# Patient Record
Sex: MALE | Race: BLACK OR AFRICAN AMERICAN | NOT HISPANIC OR LATINO | Employment: UNEMPLOYED | URBAN - METROPOLITAN AREA
[De-identification: names, ages, dates, MRNs, and addresses within clinical notes are randomized per-mention and may not be internally consistent; named-entity substitution may affect disease eponyms.]

---

## 2018-01-15 ENCOUNTER — OFFICE VISIT (OUTPATIENT)
Dept: INTERNAL MEDICINE | Facility: CLINIC | Age: 63
End: 2018-01-15
Attending: FAMILY MEDICINE
Payer: MEDICAID

## 2018-01-15 ENCOUNTER — HOSPITAL ENCOUNTER (EMERGENCY)
Facility: OTHER | Age: 63
Discharge: HOME | End: 2018-01-15
Attending: EMERGENCY MEDICINE
Payer: MEDICAID

## 2018-01-15 VITALS
WEIGHT: 146.81 LBS | HEIGHT: 73 IN | BODY MASS INDEX: 19.46 KG/M2 | SYSTOLIC BLOOD PRESSURE: 134 MMHG | HEART RATE: 118 BPM | TEMPERATURE: 99 F | OXYGEN SATURATION: 93 % | DIASTOLIC BLOOD PRESSURE: 90 MMHG

## 2018-01-15 VITALS
SYSTOLIC BLOOD PRESSURE: 130 MMHG | WEIGHT: 148 LBS | HEIGHT: 73 IN | RESPIRATION RATE: 22 BRPM | TEMPERATURE: 99 F | BODY MASS INDEX: 19.61 KG/M2 | HEART RATE: 102 BPM | DIASTOLIC BLOOD PRESSURE: 76 MMHG | OXYGEN SATURATION: 93 %

## 2018-01-15 DIAGNOSIS — J44.9 CHRONIC OBSTRUCTIVE PULMONARY DISEASE, UNSPECIFIED COPD TYPE: Primary | ICD-10-CM

## 2018-01-15 DIAGNOSIS — J44.1 COPD WITH ACUTE EXACERBATION: Primary | ICD-10-CM

## 2018-01-15 DIAGNOSIS — I10 HYPERTENSION, UNSPECIFIED TYPE: ICD-10-CM

## 2018-01-15 DIAGNOSIS — F17.210 SMOKING GREATER THAN 40 PACK YEARS: ICD-10-CM

## 2018-01-15 DIAGNOSIS — R06.02 SHORTNESS OF BREATH: ICD-10-CM

## 2018-01-15 DIAGNOSIS — Z76.89 ENCOUNTER TO ESTABLISH CARE: ICD-10-CM

## 2018-01-15 DIAGNOSIS — J06.9 UPPER RESPIRATORY TRACT INFECTION, UNSPECIFIED TYPE: ICD-10-CM

## 2018-01-15 DIAGNOSIS — Z12.11 ENCOUNTER FOR SCREENING COLONOSCOPY: ICD-10-CM

## 2018-01-15 DIAGNOSIS — Z72.0 TOBACCO ABUSE: ICD-10-CM

## 2018-01-15 DIAGNOSIS — J44.1 COPD WITH ACUTE EXACERBATION: ICD-10-CM

## 2018-01-15 LAB
ALBUMIN SERPL BCP-MCNC: 3.7 G/DL
ALP SERPL-CCNC: 61 U/L
ALT SERPL W/O P-5'-P-CCNC: 11 U/L
ANION GAP SERPL CALC-SCNC: 10 MMOL/L
AST SERPL-CCNC: 24 U/L
BASOPHILS # BLD AUTO: 0.02 K/UL
BASOPHILS NFR BLD: 0.4 %
BILIRUB SERPL-MCNC: 0.6 MG/DL
BNP SERPL-MCNC: <10 PG/ML
BUN SERPL-MCNC: 13 MG/DL
CALCIUM SERPL-MCNC: 9.2 MG/DL
CHLORIDE SERPL-SCNC: 95 MMOL/L
CO2 SERPL-SCNC: 33 MMOL/L
CREAT SERPL-MCNC: 1.1 MG/DL
DIFFERENTIAL METHOD: ABNORMAL
EOSINOPHIL # BLD AUTO: 0 K/UL
EOSINOPHIL NFR BLD: 0 %
ERYTHROCYTE [DISTWIDTH] IN BLOOD BY AUTOMATED COUNT: 13.2 %
EST. GFR  (AFRICAN AMERICAN): >60 ML/MIN/1.73 M^2
EST. GFR  (NON AFRICAN AMERICAN): >60 ML/MIN/1.73 M^2
FLUAV AG SPEC QL IA: NEGATIVE
FLUBV AG SPEC QL IA: NEGATIVE
GLUCOSE SERPL-MCNC: 105 MG/DL
HCT VFR BLD AUTO: 48.7 %
HGB BLD-MCNC: 16.3 G/DL
LACTATE SERPL-SCNC: 1.6 MMOL/L
LYMPHOCYTES # BLD AUTO: 1.1 K/UL
LYMPHOCYTES NFR BLD: 24.6 %
MCH RBC QN AUTO: 33 PG
MCHC RBC AUTO-ENTMCNC: 33.5 G/DL
MCV RBC AUTO: 99 FL
MONOCYTES # BLD AUTO: 0.5 K/UL
MONOCYTES NFR BLD: 11.5 %
NEUTROPHILS # BLD AUTO: 2.9 K/UL
NEUTROPHILS NFR BLD: 63.3 %
PLATELET # BLD AUTO: 161 K/UL
PMV BLD AUTO: 11.2 FL
POTASSIUM SERPL-SCNC: 3.6 MMOL/L
PROT SERPL-MCNC: 7.7 G/DL
RBC # BLD AUTO: 4.94 M/UL
SODIUM SERPL-SCNC: 138 MMOL/L
SPECIMEN SOURCE: NORMAL
TROPONIN I SERPL DL<=0.01 NG/ML-MCNC: <0.006 NG/ML
WBC # BLD AUTO: 4.6 K/UL

## 2018-01-15 PROCEDURE — 96374 THER/PROPH/DIAG INJ IV PUSH: CPT | Mod: 59

## 2018-01-15 PROCEDURE — 83605 ASSAY OF LACTIC ACID: CPT

## 2018-01-15 PROCEDURE — 99284 EMERGENCY DEPT VISIT MOD MDM: CPT | Mod: 25,27

## 2018-01-15 PROCEDURE — 99214 OFFICE O/P EST MOD 30 MIN: CPT | Mod: PBBFAC,PO,25 | Performed by: INTERNAL MEDICINE

## 2018-01-15 PROCEDURE — 63600175 PHARM REV CODE 636 W HCPCS: Performed by: EMERGENCY MEDICINE

## 2018-01-15 PROCEDURE — 87400 INFLUENZA A/B EACH AG IA: CPT | Mod: 59

## 2018-01-15 PROCEDURE — 99204 OFFICE O/P NEW MOD 45 MIN: CPT | Mod: S$PBB,,, | Performed by: STUDENT IN AN ORGANIZED HEALTH CARE EDUCATION/TRAINING PROGRAM

## 2018-01-15 PROCEDURE — 96361 HYDRATE IV INFUSION ADD-ON: CPT

## 2018-01-15 PROCEDURE — 93005 ELECTROCARDIOGRAM TRACING: CPT

## 2018-01-15 PROCEDURE — 93010 ELECTROCARDIOGRAM REPORT: CPT | Mod: ,,, | Performed by: INTERNAL MEDICINE

## 2018-01-15 PROCEDURE — 25000003 PHARM REV CODE 250: Performed by: EMERGENCY MEDICINE

## 2018-01-15 PROCEDURE — 25500020 PHARM REV CODE 255: Performed by: EMERGENCY MEDICINE

## 2018-01-15 PROCEDURE — 99999 PR PBB SHADOW E&M-EST. PATIENT-LVL IV: CPT | Mod: PBBFAC,,, | Performed by: INTERNAL MEDICINE

## 2018-01-15 PROCEDURE — 84484 ASSAY OF TROPONIN QUANT: CPT

## 2018-01-15 PROCEDURE — 83880 ASSAY OF NATRIURETIC PEPTIDE: CPT

## 2018-01-15 PROCEDURE — 85025 COMPLETE CBC W/AUTO DIFF WBC: CPT

## 2018-01-15 PROCEDURE — 80053 COMPREHEN METABOLIC PANEL: CPT

## 2018-01-15 RX ORDER — IPRATROPIUM BROMIDE AND ALBUTEROL SULFATE 2.5; .5 MG/3ML; MG/3ML
3 SOLUTION RESPIRATORY (INHALATION)
Status: DISCONTINUED | OUTPATIENT
Start: 2018-01-15 | End: 2018-01-30

## 2018-01-15 RX ORDER — FLUTICASONE PROPIONATE AND SALMETEROL XINAFOATE 45; 21 UG/1; UG/1
1 AEROSOL, METERED RESPIRATORY (INHALATION) 2 TIMES DAILY
Qty: 3 INHALER | Refills: 4 | Status: SHIPPED | OUTPATIENT
Start: 2018-01-15 | End: 2018-09-12

## 2018-01-15 RX ORDER — DOXYCYCLINE 100 MG/1
100 CAPSULE ORAL DAILY
Qty: 10 CAPSULE | Refills: 0 | Status: SHIPPED | OUTPATIENT
Start: 2018-01-15 | End: 2018-01-30

## 2018-01-15 RX ORDER — PREDNISONE 10 MG/1
50 TABLET ORAL DAILY
Qty: 25 TABLET | Refills: 0 | Status: SHIPPED | OUTPATIENT
Start: 2018-01-15 | End: 2018-01-20

## 2018-01-15 RX ORDER — METHYLPREDNISOLONE SOD SUCC 125 MG
125 VIAL (EA) INJECTION
Status: COMPLETED | OUTPATIENT
Start: 2018-01-15 | End: 2018-01-15

## 2018-01-15 RX ADMIN — SODIUM CHLORIDE 1000 ML: 0.9 INJECTION, SOLUTION INTRAVENOUS at 05:01

## 2018-01-15 RX ADMIN — SODIUM CHLORIDE 500 ML: 900 INJECTION, SOLUTION INTRAVENOUS at 04:01

## 2018-01-15 RX ADMIN — METHYLPREDNISOLONE SODIUM SUCCINATE 125 MG: 125 INJECTION, POWDER, FOR SOLUTION INTRAMUSCULAR; INTRAVENOUS at 04:01

## 2018-01-15 RX ADMIN — IOHEXOL 75 ML: 350 INJECTION, SOLUTION INTRAVENOUS at 05:01

## 2018-01-15 NOTE — PROGRESS NOTES
"Subjective:       Patient ID: Avni Reyes is a 62 y.o. male.    Chief Complaint: Headache; Dizziness; Establish Care; Cough; Shortness of Breath; and Shoulder Pain    HPI   Avni Reyes is a 62 y.o. male with PMHx tobacco abuse (>40 pack years) who presents with C/C of establish care. He is also complaining of 2 week history of URTI worsening shortness of breath nonproductive cough. He is accompanied by his partner who provided additional history. He only takes albuterol prn given to him at at prior ER visit and has not been on any regular medications.    He has been short of breath for years on exertion. This has worsened over the past two weeks, such as "turning a screwdriver". He has had an increased dry cough. Of note he was seen in ED in 2016 with provisional dx of COPD exacerbation. He ahs not had PFTs but may have had them done at [place] years ago and was told he may have COPD.     He is also complaining of shoulder pain particularly on elevation. This is chronic for years.     He has a surgical history of colonoscopy while incarcerated around 2009. He does not know the details of the results but he knows that they "took a sample".    Past Surgical History:   Procedure Laterality Date    COLONOSCOPY      2009. Unknown results but pathology samples taken per patient.     LEG SURGERY      seconndary to [facility]- Right leg     Family History   Problem Relation Age of Onset    Hyperlipidemia Mother     Heart disease Mother     Heart disease Father     Hyperlipidemia Father     Diabetes Father      Social History     Social History    Marital status: Single     Spouse name: N/A    Number of children: N/A    Years of education: N/A     Social History Main Topics    Smoking status: Current Every Day Smoker     Packs/day: 1.50     Types: Cigarettes     Start date: 12/16/1967    Smokeless tobacco: Never Used    Alcohol use Yes      Comment: social    Drug use: No    Sexual activity: Yes     Partners: Female " "    Other Topics Concern    None     Social History Narrative    None     Past Medical History:   Diagnosis Date    Hyperlipidemia     Hypertension          Review of Systems   Constitutional: Positive for appetite change (decreased) and fatigue. Negative for chills, fever and unexpected weight change (but unable to gain weight).   HENT: Negative for congestion, rhinorrhea and sore throat.    Eyes: Negative for redness and itching.   Respiratory: Positive for cough (nonproductive), shortness of breath and wheezing.    Cardiovascular: Negative for chest pain and palpitations.   Gastrointestinal: Negative for abdominal pain, constipation, diarrhea, nausea and vomiting.   Genitourinary: Negative for dysuria, frequency, hematuria and urgency.   Musculoskeletal: Negative for arthralgias and myalgias.   Skin: Negative for rash and wound.   Neurological: Negative for dizziness, light-headedness and headaches.       Objective:       Blood pressure (!) 134/90, pulse (!) 118, temperature 99 °F (37.2 °C), height 6' 1" (1.854 m), weight 66.6 kg (146 lb 13.2 oz), SpO2 (!) 93 %.      Physical Exam   Constitutional: He appears well-developed. He appears cachectic.   HENT:   Head: Normocephalic and atraumatic.   Eyes: Conjunctivae and EOM are normal. Pupils are equal, round, and reactive to light.   Neck: Normal range of motion. Neck supple. No JVD present.   Cardiovascular: S1 normal and S2 normal.  Tachycardia present.  Exam reveals no gallop and no friction rub.    No murmur heard.  Pulmonary/Chest: Accessory muscle usage present. Tachypnea noted. He has no wheezes. He has rhonchi in the left lower field.   Abdominal breathing noted on effort.    Abdominal: Soft. He exhibits no distension. There is no tenderness.   Musculoskeletal: He exhibits no edema.        Right shoulder: He exhibits decreased range of motion, pain and spasm.        Left shoulder: Normal.   Neurological: He is alert.   Skin: Nails show clubbing. "   Nursing note and vitals reviewed.      Assessment:       1. COPD with acute exacerbation    2. Wheezing    3. Smoking greater than 40 pack years    4. Encounter to establish care    5. Encounter for screening colonoscopy        Plan:       Avni was seen today for headache, dizziness, establish care, cough, shortness of breath and shoulder pain.    Diagnoses and all orders for this visit:    COPD with acute exacerbation  · Patient is tachychardic with very low O2 sats,d esatting to 80s on ambulation with HR in 140s. Resting HR was never less than 120 on pulse ox. O2 sats never above 94.  · Provided 1x duoneb treatment with no effect except worsening tachycardia.  · Discussed with patient that he should be evaluated in ED for COPD exacerbation and may require admission.  · Offered transportation by ambulance but patient and partner declined stating that they will proceed directly to [facility] on their own. Map to [facility] ER was provided.  · Will provide referral to [facility] pulmonary, doxycycline combivent and advair for exacerbation  · Will need formal PFTs and CXR at a minimum.   · Some concern for malignancy given B symptoms of decreased appetite and inability to gain weight.  · Encourage smoking cessation.    Upper respiratory tract infection, unspecified type    Smoking greater than 40 pack years  · Encourage smoking cessation and refer to cessation program.     · Encounter to establish care  · Needs A1C, CBC CMP HCV (never tested)  · Will arrange for repeat C-scope given concerning history.  · Will arrange for pneumococcal vaccinations at next visit      F/u in 2 weeks with first available  Case d/w Dr. Valeria Gibson II, MD  Internal Medicine PGY3  [phone]  d

## 2018-01-15 NOTE — ED PROVIDER NOTES
Encounter Date: 1/15/2018    SCRIBE #1 NOTE: I, Demetrius Tyler, am scribing for, and in the presence of, Dr. Ford.       History     Chief Complaint   Patient presents with    Shortness of Breath     He was seen at clinic and was sent over here for shortness of breath.  He states that he has been short of breath for a while but it has gotten to where he feels he can't catch his breath.       3:43 PM    Patient is a 62 y.o. male with a history of COPD who presents to the ED with complaint of shortness of breath. He was seen by his PCP earlier today and was referred to the ED for further evaluation. Shortness of breath is worse with exertion. He reports improvement after breathing treatment administered here. He reports recent sickness with symptoms including fatigue and productive cough with green sputum. He denies fever or chills. He reports positive sick contact with his wife who has flu-like symptoms. He states he did not get a flu shot this year. He denies taking any OTC medications for current symptoms or any daily prescription medications. He reports that he had not been to see a doctor for two years prior to today. He denies past hospitalizations for shortness of breath. He admits to use of tobacco. He has no additional complaints.      The history is provided by the patient and the spouse.     Review of patient's allergies indicates:  No Known Allergies  Past Medical History:   Diagnosis Date    COPD (chronic obstructive pulmonary disease)     Hyperlipidemia     Hypertension      Past Surgical History:   Procedure Laterality Date    COLONOSCOPY      2009. Unknown results but pathology samples taken per patient.     LEG SURGERY      [facility]conndary to [facility] [place]- Right leg     Family History   Problem Relation Age of Onset    Hyperlipidemia Mother     Heart disease Mother     Heart disease Father     Hyperlipidemia Father     Diabetes Father      Social History   Substance Use Topics    Smoking status:  Current Every Day Smoker     Packs/day: 1.50     Types: Cigarettes     Start date: 12/16/1967    Smokeless tobacco: Never Used    Alcohol use Yes      Comment: social     Review of Systems   Constitutional: Positive for fatigue. Negative for fever.   HENT: Negative for sore throat.    Respiratory: Positive for shortness of breath.    Cardiovascular: Negative for chest pain.   Gastrointestinal: Negative for nausea.   Genitourinary: Negative for dysuria.   Musculoskeletal: Negative for back pain.   Skin: Negative for rash.   Neurological: Negative for weakness.   Hematological: Does not bruise/bleed easily.   Psychiatric/Behavioral: Negative for confusion.       Physical Exam     Initial Vitals [01/15/18 1519]   BP Pulse Resp Temp SpO2   133/83 (!) 127 (!) 22 99 °F (37.2 °C) (!) 94 %      MAP       99.67         Physical Exam    Nursing note and vitals reviewed.  Constitutional: He appears well-developed and well-nourished. He is not diaphoretic.  Non-toxic appearance. No distress.   HENT:   Head: Normocephalic and atraumatic.   Eyes: Conjunctivae and EOM are normal. Pupils are equal, round, and reactive to light.   No conjunctival pallor.   Neck: Normal range of motion. Neck supple. No JVD present.   Cardiovascular: Regular rhythm and normal heart sounds. Tachycardia present.    Pulmonary/Chest: No respiratory distress.   Diminished breath sounds bilaterally with prolonged end expiratory wheezing.   Musculoskeletal: Normal range of motion. He exhibits no edema (no lower extremity edema).   Neurological: He is alert and oriented to person, place, and time.   Skin: Skin is warm and dry.   Psychiatric: He has a normal mood and affect. His behavior is normal. Judgment and thought content normal.         ED Course   Procedures  Labs Reviewed   CBC W/ AUTO DIFFERENTIAL - Abnormal; Notable for the following:        Result Value    MCV 99 (*)     MCH 33.0 (*)     All other components within normal limits   COMPREHENSIVE  METABOLIC PANEL - Abnormal; Notable for the following:     CO2 33 (*)     All other components within normal limits   LACTIC ACID, PLASMA   TROPONIN I   INFLUENZA A AND B ANTIGEN   B-TYPE NATRIURETIC PEPTIDE     Imaging Results          CTA Chest Non-Coronary - PE Study (Final result)  Result time 01/15/18 17:36:05    Final result by Dany Mulligan MD (01/15/18 17:36:05)                 Impression:       1.  No evidence of central pulmonary embolism.  Distal and subsegmental pulmonary emboli are not excluded.  Suggest correlation with D-dimer and DVT study.    2.  Diffuse centrilobular emphysema with enlargement of the pulmonary tree.  The findings may be seen with pulmonary artery hypertension.    3.  Nonspecific right hilar lymphadenopathy.          Electronically signed by: DANY MULLIGAN MD  Date:     01/15/18  Time:    17:36              Narrative:    Exam: [id number]  01/15/18  17:04:16 [id number] ([facility]) : CTA CHEST NON CORONARY    Technique:     CT images of the chest obtained during pulmonary arterial phase after injection of 75 cc Omnipaque IV contrast. Coronal and Sagittal Reformats were obtained.       Comparison:    Chest x-ray dated 01/15/2018    Findings:      CT pulmonary embolism examination:  No filling defects are identified within the central pulmonary tree.  The distal and subsegmental pulmonary tree are poorly visualized secondary to motion.  There is enlargement of the pulmonary trunk and main pulmonary arteries.    CT chest examination:   The thyroid gland is poorly visualized.  The base of the neck is grossly unremarkable.    The trachea and the central airways are within normal limits.  No definitive endobronchial lesion is identified.  There is no evidence of bronchiectasis.    The heart is unremarkable.  No pericardial effusions are present.  Coronary artery calcifications are present.  There is no evidence of intracardiac thrombus.    The ascending thoracic aorta measures 3.4 x 3.2 cm.  The  descending aorta measures 2.7 x 3.0 cm.  No intimal flap is present to suggest a dissection.  The great vessels arising from aortic arch are within normal limits.    There is a 2.2 x 1.6 cm right hilar lymph node.  No additional large lymph nodes are present.  The axillary regions are within normal limits.    There are no pleural effusions.  There is no evidence of a pneumothorax.  There is no evidence of a pneumomediastinum.  No airspace opacities are present.  There is diffuse centrilobular emphysema.    The esophagus is unremarkable.  The visualized upper abdominal structures are within normal limits.  There is no evidence of free air in the upper abdomen.    There is diffuse cachexia.  The chest wall is otherwise unremarkable.  There are degenerative changes in the osseous structures.                             X-Ray Chest 1 View (Final result)  Result time 01/15/18 16:31:52    Final result by Ceasar Brooks MD (01/15/18 16:31:52)                 Impression:      Hyperinflated clear lungs.      Electronically signed by: CEASAR BROOKS  Date:     01/15/18  Time:    16:31              Narrative:    CLINICAL HISTORY:  Shortness of breath    TECHNIQUE: Single view portable frontal radiograph of the chest    COMPARISON: Chest radiograph 11/21/16.      FINDINGS:  Support devices: None    Chest: Cardiac and mediastinal silhouettes are normal.  Lungs are hyperinflated and clear.  No pleural effusion or pneumothorax.    Upper Abdomen: Normal.    Other: N/A.                              EKG Readings: (Independently Interpreted)   Sinus tachycardia at a rate of 120 bpm. Normal axis. Normal intervals. No STEMI.          Medical Decision Making:   Initial Assessment:   Urgent evaluation of 62-year-old gentleman with chronic tobacco abuse, sent from initial primary care evaluation with concern for respiratory distress.  Patient endorses chronic shortness of breath, with worsened dyspnea on exertion.  Patient was sent from clinic  given exertional tachycardia and hypoxia, status post receiving nebulizer treatment.  On exam patient denies weight loss, no hemoptysis, endorses green sputum, no fevers, weight with similar URI symptoms.  On exam patient is well appearing despite vital signs notable for tachycardia, mild hypoxia.  No conversational dyspnea noted.  Suspect COPD, with acute exacerbation, influenza, pneumonia, lower suspicion for PE at this time.  Patient given additional steroids, IV fluids, with plan to reassess.  Independently Interpreted Test(s):   I have ordered and independently interpreted EKG Reading(s) - see prior notes  Clinical Tests:   Lab Tests: Ordered and Reviewed  Radiological Study: Reviewed and Ordered  Medical Tests: Ordered and Reviewed  ED Management:  Labs without evidence of CHF, x-ray with hyperinflation, without infiltrate, CTA negative for large central PE.  Patient now more comfortably ambulating, without tachycardia nor hypoxia.  Suspect symptoms from underlying long-standing COPD versus pulmonary hypertension.  Patient already to be discharged home with doxycycline, nebs, follow up [facility] pulmonology.            Scribe Attestation:   Scribe #1: I performed the above scribed service and the documentation accurately describes the services I performed. I attest to the accuracy of the note.    Attending Attestation:           Physician Attestation for Scribe:  Physician Attestation Statement for Scribe #1: I, Dr. Ford, reviewed documentation, as scribed by Demetrius Tyler in my presence, and it is both accurate and complete.                 ED Course      Clinical Impression:     1. Chronic obstructive pulmonary disease, unspecified COPD type    2. Shortness of breath    3. Tobacco abuse          Disposition:   Disposition: Discharged  Condition: Stable                        Nabila Ford MD  01/15/18 [id number]

## 2018-01-15 NOTE — PROGRESS NOTES
I have reviewed and concur with the resident's history, physical, assessment, and plan.  I have personally interviewed and examined the patient  Avni Reyes at bedside. See below addendum for my evaluation and additional findings.    61 y/o man with previous ER visit in 11/2016 for COPD here with acute on chronic dyspnea on exertion, +persistent cough. Generally nonproductive cough but worse in past 2-3 weeks after getting URI/flu-like symptoms. No fevers. No congestion currently.   On evaluation, has had normal SaO2 in the past but this is low today - 90-94% on RA at rest, down to 88% with walking a short distance. HR in 90-110s at rest, but increases to 130-140s after walking short distance.  Did have coarse breath sounds / crackles at bases (LLL > RLL) that improved with neb treatment, and overall air movement improved, but SaO2 did not improve significantly with nebs. No wheezing on exam.   He feels overall that his dyspnea on exertion has been gradually progressive over the past 6+ months, with a more acute worsening in the past ~2 weeks.     Given likely COPD with acute exacerbation with hypoxia and tachycardia, sending to ER from clinic today. Discussed sending in EMS; patient declines, and given that clinically he appears stable enough to transport to ER with his partner and has a strong preference for this over EMS, will have him go directly to ER. Report called to ER by Dr Gibson.    Plan for close ER/hospital follow up with in 2 weeks.    Ponce Shaw MD

## 2018-01-16 NOTE — ED NOTES
Ambulated pt throughout hallway. Pt with oxygen sats at 91%. Denies SOB with ambulation. MD notified.

## 2018-01-16 NOTE — ED NOTES
Pt presents to ED with c/o SOB with cough x couple weeks. Pt reports recent exposure to flu illness by significant other. Pt oxygen saturations at 94% on room air. Skin and mucous membranes very dry. Pt AAOx4 and appropriate at this time. Respirations mildly increased, no use of accessory muscles noted. No acute distress noted. Pt placed on cardiac monitoring, BP, and pulse ox monitoring. Sinus tachycardia noted on monitor at 120 bpm, MD aware.

## 2018-01-16 NOTE — ED NOTES
"Appearance: Pt awake, alert & oriented to person, place & time. Pt in no acute distress at present time. Pt is clean and well groomed with clothes appropriately fastened.   Skin: Skin warm, dry & intact. Color consistent with ethnicity. Mucous membranes dry. No breakdown or brusing noted.   Musculoskeletal: Patient moving all extremities well, no obvious swelling or deformities noted. Pt ambulatory with steady gait.    Respiratory: Respirations even, mild increase in RR. Visible chest rise noted. Airway is open and patent. No accessory muscle use noted. Pt also reporting productive cough x "couple weeks." Lung sounds are clear upon auscultation.   Neurologic: Sensation is intact. Speech is clear and appropriate. Eyes open spontaneously, behavior appropriate to situation, follows commands, facial expression symmetrical, bilateral hand grasp equal and even, purposeful motor response noted.  Cardiac: No Bilateral lower extremity edema. Cap refill is <3 seconds. Pt denies active chest pains, SOB, dizziness, blurred vision, weakness or fatigue at this time.   Abdomen: Abdomen soft, non-tender to palpation. Pt denies active abd pains, cramping or discomfort, No N/V/D at this time.   : Pt reports no dysuria or hematuria.        "

## 2018-01-30 ENCOUNTER — OFFICE VISIT (OUTPATIENT)
Dept: INTERNAL MEDICINE | Facility: CLINIC | Age: 63
End: 2018-01-30
Attending: FAMILY MEDICINE
Payer: MEDICAID

## 2018-01-30 VITALS
BODY MASS INDEX: 20.79 KG/M2 | HEIGHT: 73 IN | WEIGHT: 156.88 LBS | HEART RATE: 108 BPM | OXYGEN SATURATION: 96 % | DIASTOLIC BLOOD PRESSURE: 86 MMHG | SYSTOLIC BLOOD PRESSURE: 124 MMHG | TEMPERATURE: 98 F

## 2018-01-30 DIAGNOSIS — J44.1 COPD WITH ACUTE EXACERBATION: Primary | ICD-10-CM

## 2018-01-30 DIAGNOSIS — F17.210 SMOKING GREATER THAN 40 PACK YEARS: ICD-10-CM

## 2018-01-30 PROCEDURE — 3008F BODY MASS INDEX DOCD: CPT | Mod: ,,, | Performed by: STUDENT IN AN ORGANIZED HEALTH CARE EDUCATION/TRAINING PROGRAM

## 2018-01-30 PROCEDURE — 99214 OFFICE O/P EST MOD 30 MIN: CPT | Mod: PBBFAC,PO | Performed by: STUDENT IN AN ORGANIZED HEALTH CARE EDUCATION/TRAINING PROGRAM

## 2018-01-30 PROCEDURE — 90471 IMMUNIZATION ADMIN: CPT | Mod: PBBFAC,PO

## 2018-01-30 PROCEDURE — 99999 PR PBB SHADOW E&M-EST. PATIENT-LVL IV: CPT | Mod: PBBFAC,,, | Performed by: STUDENT IN AN ORGANIZED HEALTH CARE EDUCATION/TRAINING PROGRAM

## 2018-01-30 PROCEDURE — 99213 OFFICE O/P EST LOW 20 MIN: CPT | Mod: S$PBB,,, | Performed by: STUDENT IN AN ORGANIZED HEALTH CARE EDUCATION/TRAINING PROGRAM

## 2018-01-30 NOTE — PATIENT INSTRUCTIONS
Call [facility] pulm: [phone] or [phone] for an appointment   Call [facility] pulmonology: [phone]    Kicking the Smoking Habit  If you smoke, quitting is one of the best changes you can make for your heart and your overall health. Your risk of heart attack goes down within one day of putting out that last cigarette. As you go longer without smoking, your risk goes down even more. Quitting isnt easy, but millions of people have done it. You can, too. Its never too late to quit.  Getting started  Boost your chances of success by deciding on your quit plan. Your health care provider and cardiac rehab team can help you develop this plan. Even if youve already quit, its easy to slip back into smoking.  Your plan can help you avoid and recover from relapse.  In any case, start by setting a date to quit within a month, and do it.    Keys to your quit plan  · Talk to your healthcare provider about prescription medicines and nicotine replacement products that help stop the urge to smoke.   · Join a support group or quit smoking program. Talking with others about the challenges of quitting can help you get through them.  · Ask other smokers in your household to quit with you.  · Look for the cues in your life that you associate with smoking and avoid them.  Track your triggers  What gives you that N-zlub-w-cigarette feeling? List all the situations that make you want a cigarette. Then think of other ways to deal with these situations. Here are some examples:  Situation How I'll handle it   Finishing a meal Get up from the table and take a walk   Having an argument Find a quiet place and breathe deeply   Feeling lonely or bored Call a friend to talk         Tips for quitting successfully  · List the benefits of quitting such as reducing heart risks and saving money. Keep this list and review it whenever you feel like smoking.  · Get support. Let your friends know you may call them to chat when you have an urge to  smoke.  · If youve tried to quit before without success, this time avoid the triggers that may cause the relapse.  · Make the most of slip-ups. Try to learn from them, and then get back on track.  · Be accountable to your friends and your calendar so that you stay on track.  For family and friends  · Be supportive and patient. Quitting smoking can be difficult and stressful.  · If you smoke, nows a great time to quit. Even if you dont quit, never smoke around your loved one. Secondhand smoke is dangerous to his or her heart.  · The best goals are accomplished in teams. Remember that when your loved one states he or she wants to stop smoking.  Date Last Reviewed: 7/1/2016  © 2428-7023 [employer]. [address] [place] [address], [place] [address]. All rights reserved. This information is not intended as a substitute for professional medical care. Always follow your healthcare professional's instructions.

## 2018-01-30 NOTE — PROGRESS NOTES
I have been physically present during the critical or key portions of the service furnished by the resident and I have participated in the management of the patient.  Dr. Orellana

## 2018-01-30 NOTE — PROGRESS NOTES
Subjective:       Patient ID: Avni Reyes is a 62 y.o. male.    Chief Complaint: Follow-up (2 weeks)    HPI      62 y.o. male with PMHx tobacco abuse (>40 pack years) who was seen in [facility] for COPD exacerbation/establish care 2 weeks ago and was sent to ED for SOB/COPD exacerbation. At the ED visit, patient had a CTA that did not reveal PE, but did reveal emphysematous changes. Pt was given steroids, doxycycline, and nebs and was discharged home and instructed to follow up with [facility] pulm. Reported he stopped smoking a pack of cigs/day and is smoking 2 cigars a week now. States that he is using is combivent about twice a day instead of 3-4x a day when he had his COPD exacerbation. States he feels better after his course of steroids and abx.      Review of Systems   Constitutional: Negative for chills and unexpected weight change.   HENT: Negative for sore throat.    Respiratory: Positive for cough (stable). Negative for shortness of breath and wheezing.    Cardiovascular: Negative for chest pain and palpitations.   Gastrointestinal: Negative for abdominal pain, diarrhea, nausea and vomiting.   Neurological: Negative for weakness.       Objective:       Vitals:    01/30/18 1404   BP: 124/86   Pulse: 108   Temp: 98.3 °F (36.8 °C)   SaO2 96%    Physical Exam   Constitutional: He is oriented to person, place, and time. No distress.   HENT:   Mouth/Throat: No oropharyngeal exudate.   Cardiovascular: Normal rate, regular rhythm and normal heart sounds.    Pulmonary/Chest: Effort normal and breath sounds normal. He has no wheezes.   Abdominal: Soft. Bowel sounds are normal. There is no tenderness.   Lymphadenopathy:     He has no cervical adenopathy.   Neurological: He is alert and oriented to person, place, and time.   Skin: Skin is warm and dry.   Psychiatric: He has a normal mood and affect.       Assessment:       1. COPD with acute exacerbation    2. Smoking greater than 40 pack years        Plan:       63 yo male with  COPD here for follow up after COPD exacerbation about 2 weeks ago. States he is doing well after his course of treatment with doxy and prednisone. Uses his rescue inhaler about twice a day. Still smoking but stated he is interested in cessation.     COPD with acute exacerbation  -     (In Office Administered) Pneumococcal Polysaccharide Vaccine (23 Valent) (SQ/IM)  - Will give flu shot as well today  - Discussed with patient to continue with inhalers, stated he still has not received his Advair and will get in touch with Dr. Gibson for PA.       Smoking greater than 40 pack years         -  Smoking cessation clinic and [facility] pul[facility] referral placed at last office visit   -> pt interested and stated he will call [facility] and [facility] pul[facility] for appts    D/w Dr. Orellana    RTC PRN, CC Dr. Arthur Mcdermott PGY3

## 2018-02-12 ENCOUNTER — TELEPHONE (OUTPATIENT)
Dept: SMOKING CESSATION | Facility: CLINIC | Age: 63
End: 2018-02-12

## 2018-02-14 ENCOUNTER — TELEPHONE (OUTPATIENT)
Dept: ENDOSCOPY | Facility: HOSPITAL | Age: 63
End: 2018-02-14

## 2018-02-14 DIAGNOSIS — Z12.11 SPECIAL SCREENING FOR MALIGNANT NEOPLASMS, COLON: Primary | ICD-10-CM

## 2018-02-14 RX ORDER — POLYETHYLENE GLYCOL 3350, SODIUM SULFATE ANHYDROUS, SODIUM BICARBONATE, SODIUM CHLORIDE, POTASSIUM CHLORIDE 236; 22.74; 6.74; 5.86; 2.97 G/4L; G/4L; G/4L; G/4L; G/4L
4 POWDER, FOR SOLUTION ORAL ONCE
Qty: 4000 ML | Refills: 0 | Status: SHIPPED | OUTPATIENT
Start: 2018-02-14 | End: 2018-02-14

## 2018-02-22 ENCOUNTER — TELEPHONE (OUTPATIENT)
Dept: SMOKING CESSATION | Facility: CLINIC | Age: 63
End: 2018-02-22

## 2018-02-22 NOTE — TELEPHONE ENCOUNTER
Cell number does not have voice mail set up. Home phone no answer. Calling about missed appointment for tobacco cessation program.

## 2018-03-13 ENCOUNTER — TELEPHONE (OUTPATIENT)
Dept: SMOKING CESSATION | Facility: CLINIC | Age: 63
End: 2018-03-13

## 2018-03-15 ENCOUNTER — TELEPHONE (OUTPATIENT)
Dept: SMOKING CESSATION | Facility: CLINIC | Age: 63
End: 2018-03-15

## 2018-03-15 NOTE — TELEPHONE ENCOUNTER
Attempted to contact patient about missed appointment for tobacco cessation program. Mobile number does not have voice mail set up and Home number is disconnected at this time.

## 2018-04-03 ENCOUNTER — TELEPHONE (OUTPATIENT)
Dept: SMOKING CESSATION | Facility: CLINIC | Age: 63
End: 2018-04-03

## 2018-04-03 NOTE — TELEPHONE ENCOUNTER
Attempted to reach patient to reschedule appointment for the free tobacco cessation program. Cell number no voice mail set up and home number is busy.

## 2018-04-12 ENCOUNTER — TELEPHONE (OUTPATIENT)
Dept: SMOKING CESSATION | Facility: CLINIC | Age: 63
End: 2018-04-12

## 2018-04-12 NOTE — TELEPHONE ENCOUNTER
Attempted to contact patient about missed intake appointment for tobacco cessation program. Cell phone does not have a voice mail box and home number is a fast busy.

## 2018-05-07 ENCOUNTER — TELEPHONE (OUTPATIENT)
Dept: SMOKING CESSATION | Facility: CLINIC | Age: 63
End: 2018-05-07

## 2018-05-14 ENCOUNTER — TELEPHONE (OUTPATIENT)
Dept: SMOKING CESSATION | Facility: CLINIC | Age: 63
End: 2018-05-14

## 2018-05-14 NOTE — TELEPHONE ENCOUNTER
Attempted to contact patient about missed intake appointment for the free tobacco cessation program. Patient cell phone does not have voice mail set up and the home number is no longer a working number.

## 2018-08-11 ENCOUNTER — TELEPHONE (OUTPATIENT)
Dept: ENDOSCOPY | Facility: HOSPITAL | Age: 63
End: 2018-08-11

## 2018-09-11 DIAGNOSIS — J44.1 COPD WITH ACUTE EXACERBATION: ICD-10-CM

## 2018-09-11 NOTE — TELEPHONE ENCOUNTER
----- Message from Dayana Najera sent at 9/11/2018 10:29 AM CDT -----  Contact: self/[phone]  Type: Rx    Name of medication(s): ipratropium-albuterol (COMBIVENT)  mcg/actuation inhaler    Is this a refill? New rx? Refill     Who prescribed medication?      Pharmacy Name, Phone, & Location:[facility] [address] [place] [address] [facility]    Comments: Please advise.      Thanks

## 2018-09-12 ENCOUNTER — TELEPHONE (OUTPATIENT)
Dept: INTERNAL MEDICINE | Facility: CLINIC | Age: 63
End: 2018-09-12

## 2018-09-12 DIAGNOSIS — J44.1 COPD WITH ACUTE EXACERBATION: Primary | ICD-10-CM

## 2018-09-12 RX ORDER — FLUTICASONE PROPIONATE 110 UG/1
1 AEROSOL, METERED RESPIRATORY (INHALATION) 2 TIMES DAILY
Qty: 12 G | Refills: 2 | Status: SHIPPED | OUTPATIENT
Start: 2018-09-12 | End: 2019-04-18 | Stop reason: SDUPTHER

## 2018-09-12 NOTE — TELEPHONE ENCOUNTER
"----- Message from Gail Levine sent at 9/12/2018 12:40 PM CDT -----  Contact: Ruby  Prior Authorization Needed    Rx: fluticasone-salmeterol 45-21 mcg/actuation HFAA    To submit the PA:    1: Go to " [email or web address] " and click "Enter a Key"    2. Enter the patient's last name and date of birth and the key.      KEY: [id number]    3. Complete the forms and click "send to Plan"    Note chart when prior authorization has been submitted.    Please notify pharmacy when prior authorization has been approved.    Thank You    "

## 2018-09-12 NOTE — TELEPHONE ENCOUNTER
Hi, I have changed to flovent 110 on its own instead of the one that was prescribed since it is not covered.  When I am in the office tomorrow I can tell you which new resident he should followup with.  Thank you, Bautista Esteban

## 2018-11-08 DIAGNOSIS — J44.1 COPD WITH ACUTE EXACERBATION: Primary | ICD-10-CM

## 2018-11-08 RX ORDER — FLUTICASONE PROPIONATE AND SALMETEROL XINAFOATE 45; 21 UG/1; UG/1
1 AEROSOL, METERED RESPIRATORY (INHALATION) 2 TIMES DAILY
Qty: 1 INHALER | Refills: 0 | Status: SHIPPED | OUTPATIENT
Start: 2018-11-08 | End: 2021-10-19

## 2018-11-08 NOTE — TELEPHONE ENCOUNTER
Hi, please call patient -- to set up with new pcp in resident clinic Dr. Domi Whiting.  I will send in one prescription of this medicine, but patient needs to be seen for any further prescriptions.  Thank you, Bautista Esteban

## 2018-12-07 ENCOUNTER — IMMUNIZATION (OUTPATIENT)
Dept: PHARMACY | Facility: CLINIC | Age: 63
End: 2018-12-07

## 2018-12-07 ENCOUNTER — LAB VISIT (OUTPATIENT)
Dept: LAB | Facility: HOSPITAL | Age: 63
End: 2018-12-07
Payer: MEDICAID

## 2018-12-07 ENCOUNTER — OFFICE VISIT (OUTPATIENT)
Dept: INTERNAL MEDICINE | Facility: CLINIC | Age: 63
End: 2018-12-07
Payer: MEDICAID

## 2018-12-07 VITALS
DIASTOLIC BLOOD PRESSURE: 95 MMHG | HEART RATE: 93 BPM | BODY MASS INDEX: 22.32 KG/M2 | WEIGHT: 168.44 LBS | SYSTOLIC BLOOD PRESSURE: 140 MMHG | OXYGEN SATURATION: 94 % | HEIGHT: 73 IN

## 2018-12-07 DIAGNOSIS — J44.9 CHRONIC OBSTRUCTIVE PULMONARY DISEASE, UNSPECIFIED COPD TYPE: Primary | ICD-10-CM

## 2018-12-07 DIAGNOSIS — F17.210 SMOKING GREATER THAN 40 PACK YEARS: ICD-10-CM

## 2018-12-07 DIAGNOSIS — Z00.00 HEALTHCARE MAINTENANCE: ICD-10-CM

## 2018-12-07 DIAGNOSIS — M25.511 CHRONIC RIGHT SHOULDER PAIN: ICD-10-CM

## 2018-12-07 DIAGNOSIS — K21.9 GASTROESOPHAGEAL REFLUX DISEASE, ESOPHAGITIS PRESENCE NOT SPECIFIED: ICD-10-CM

## 2018-12-07 DIAGNOSIS — G89.29 CHRONIC RIGHT SHOULDER PAIN: ICD-10-CM

## 2018-12-07 LAB
25(OH)D3+25(OH)D2 SERPL-MCNC: 8 NG/ML
ALBUMIN SERPL BCP-MCNC: 3.9 G/DL
ALP SERPL-CCNC: 56 U/L
ALT SERPL W/O P-5'-P-CCNC: 13 U/L
ANION GAP SERPL CALC-SCNC: 9 MMOL/L
AST SERPL-CCNC: 21 U/L
BILIRUB SERPL-MCNC: 0.9 MG/DL
BUN SERPL-MCNC: 10 MG/DL
CALCIUM SERPL-MCNC: 9 MG/DL
CHLORIDE SERPL-SCNC: 105 MMOL/L
CHOLEST SERPL-MCNC: 197 MG/DL
CHOLEST/HDLC SERPL: 2.6 {RATIO}
CO2 SERPL-SCNC: 28 MMOL/L
CREAT SERPL-MCNC: 1 MG/DL
ERYTHROCYTE [DISTWIDTH] IN BLOOD BY AUTOMATED COUNT: 14.2 %
EST. GFR  (AFRICAN AMERICAN): >60 ML/MIN/1.73 M^2
EST. GFR  (NON AFRICAN AMERICAN): >60 ML/MIN/1.73 M^2
ESTIMATED AVG GLUCOSE: 100 MG/DL
GLUCOSE SERPL-MCNC: 76 MG/DL
HBA1C MFR BLD HPLC: 5.1 %
HCT VFR BLD AUTO: 48.5 %
HDLC SERPL-MCNC: 76 MG/DL
HDLC SERPL: 38.6 %
HGB BLD-MCNC: 15.5 G/DL
LDLC SERPL CALC-MCNC: 98.6 MG/DL
MCH RBC QN AUTO: 32 PG
MCHC RBC AUTO-ENTMCNC: 32 G/DL
MCV RBC AUTO: 100 FL
NONHDLC SERPL-MCNC: 121 MG/DL
PLATELET # BLD AUTO: 207 K/UL
PMV BLD AUTO: 10.3 FL
POTASSIUM SERPL-SCNC: 3.8 MMOL/L
PROT SERPL-MCNC: 6.9 G/DL
RBC # BLD AUTO: 4.84 M/UL
SODIUM SERPL-SCNC: 142 MMOL/L
TRIGL SERPL-MCNC: 112 MG/DL
WBC # BLD AUTO: 3.89 K/UL

## 2018-12-07 PROCEDURE — 80061 LIPID PANEL: CPT

## 2018-12-07 PROCEDURE — 99213 OFFICE O/P EST LOW 20 MIN: CPT | Mod: S$PBB,,, | Performed by: STUDENT IN AN ORGANIZED HEALTH CARE EDUCATION/TRAINING PROGRAM

## 2018-12-07 PROCEDURE — 99215 OFFICE O/P EST HI 40 MIN: CPT | Mod: PBBFAC | Performed by: STUDENT IN AN ORGANIZED HEALTH CARE EDUCATION/TRAINING PROGRAM

## 2018-12-07 PROCEDURE — 80053 COMPREHEN METABOLIC PANEL: CPT

## 2018-12-07 PROCEDURE — 85027 COMPLETE CBC AUTOMATED: CPT

## 2018-12-07 PROCEDURE — 83036 HEMOGLOBIN GLYCOSYLATED A1C: CPT

## 2018-12-07 PROCEDURE — 99999 PR PBB SHADOW E&M-EST. PATIENT-LVL V: CPT | Mod: PBBFAC,,, | Performed by: STUDENT IN AN ORGANIZED HEALTH CARE EDUCATION/TRAINING PROGRAM

## 2018-12-07 PROCEDURE — 82306 VITAMIN D 25 HYDROXY: CPT

## 2018-12-07 PROCEDURE — 36415 COLL VENOUS BLD VENIPUNCTURE: CPT

## 2018-12-07 PROCEDURE — 86803 HEPATITIS C AB TEST: CPT

## 2018-12-07 RX ORDER — TIOTROPIUM BROMIDE 18 UG/1
18 CAPSULE ORAL; RESPIRATORY (INHALATION) DAILY
Qty: 360 CAPSULE | Refills: 0 | Status: SHIPPED | OUTPATIENT
Start: 2018-12-07 | End: 2019-12-16

## 2018-12-07 RX ORDER — CYCLOBENZAPRINE HCL 5 MG
5 TABLET ORAL 3 TIMES DAILY PRN
Qty: 30 TABLET | Refills: 0 | Status: SHIPPED | OUTPATIENT
Start: 2018-12-07 | End: 2019-01-16

## 2018-12-07 RX ORDER — ALBUTEROL SULFATE 90 UG/1
2 AEROSOL, METERED RESPIRATORY (INHALATION) EVERY 6 HOURS PRN
Qty: 18 G | Refills: 0 | Status: ON HOLD | OUTPATIENT
Start: 2018-12-07 | End: 2022-05-30

## 2018-12-07 RX ORDER — PHENOL/SODIUM PHENOLATE
20 AEROSOL, SPRAY (ML) MUCOUS MEMBRANE DAILY
Qty: 30 EACH | Refills: 11 | COMMUNITY
Start: 2018-12-07 | End: 2019-12-07
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
## 2018-12-07 NOTE — PATIENT INSTRUCTIONS
Medicines for Acid Reflux  Your healthcare provider has told you that you have acid reflux. This condition causes stomach acid to wash up into your throat. For most people, acid reflux is troubling but not dangerous. But left untreated, acid reflux sometimes damages the esophagus. Medicines can help control acid reflux and limit your risk of future problems.  Medicines for acid reflux  Your healthcare provider may prescribe medicine to help treat your acid reflux. Medicine will be based on your symptoms and any test results. Your provider will explain how to take your medicine. You will also be told about possible side effects.  Reducing stomach acid  Your provider may suggest antacids that you can buy over the counter. Antacids can give fast relief. Or you may be told to take a type of medicine called H2 blockers. These are available over the counter and by prescription (for higher doses).  Blocking stomach acid  In more severe cases, your healthcare provider may suggest stronger medicines such as proton pump inhibitors (PPIs). These keep the stomach from making acid. They are often prescribed for long-term use.  Other medicines  In some cases medicines to reduce or block stomach acid may not work. Then you may be switched to another type of medicine that helps your stomach empty better.     Date Last Reviewed: 10/1/2016  © 0628-1196 [employer]. [address] [place] [address], [place] [address]. All rights reserved. This information is not intended as a substitute for professional medical care. Always follow your healthcare professional's instructions.    Tips to Control Acid Reflux    To control acid reflux, youll need to make some basic diet and lifestyle changes. The simple steps outlined below may be all youll need to ease discomfort.  Watch what you eat  · Avoid fatty foods and spicy foods.  · Eat fewer acidic foods, such as citrus and tomato-based foods. These can increase symptoms.  · Limit  drinking alcohol, caffeine, and fizzy beverages. All increase acid reflux.  · Try limiting chocolate, peppermint, and spearmint. These can worsen acid reflux in some people.  Watch when you eat  · Avoid lying down for 3 hours after eating.  · Do not snack before going to bed.  Raise your head  Raising your head and upper body by 4 to 6 inches helps limit reflux when youre lying down. Put blocks under the head of your bed frame to raise it.  Other changes  · Lose weight, if you need to  · Dont exercise near bedtime  · Avoid tight-fitting clothes  · Limit aspirin and ibuprofen  · Stop smoking   Date Last Reviewed: 7/1/2016  © 7204-2316 [email or web address]. [address] [place] [address], [place] [address]. All rights reserved. This information is not intended as a substitute for professional medical care. Always follow your healthcare professional's instructions.        Diagnosing COPD  Your healthcare provider will use your past health history, a physical exam, and certain tests to diagnose COPD.  Health history  Your healthcare provider learns about your health history by asking questions. Topics include:  · History of present illness. Tell your healthcare provider about your symptoms. Also tell him or her how long you have had them.  · Past medical and surgical history. Share other health problems and surgery you have had.  · Family history. Report serious health problems in close family members. This is especially true of any lung problems.  · Social and environmental history. The most important factor in COPD is whether you smoke or have smoked in the past. You should also tell your provider if you have been around secondhand smoke, harmful chemicals, or air pollution.  · Functional assessment. Report whether breathing gets in the way of your daily activities.  Physical exam    Your provider will examine you. He or she will check your heart and lungs with a stethoscope. The focus will be on your airways,  including your nose and throat.   Diagnostic tests  · Pulmonary function tests measure the flow of air into and out of your lungs. They also check how much air your lungs can hold. The most common pulmonary function test is spirometry. This measures how fast and how much air you can blow out (exhale). This test is important to help diagnose COPD.  · Pulse oximetry shows how much oxygen is in your blood (oxygen saturation). This may be done at rest. It may also be done during and after exercise.  · Arterial blood gas tests measure levels of oxygen and carbon dioxide in your blood.  · Chest X-rays show the size and shape of your lungs. They can also show certain problems in the lungs.  · CT scans show detailed images of the lungs.  Date Last Reviewed: 10/1/2016  © 3638-9775 [employer]. [address] [place] [address], [place] [address]. All rights reserved. This information is not intended as a substitute for professional medical care. Always follow your healthcare professional's instructions.        Treatment for COPD    Your healthcare provider will prescribe the best treatments for your COPD.  Treatment  Recommendations include the following:  · Medicines. Some medicines help relieve symptoms when you have them. Others are taken daily to control inflammation in the lungs. Always take your medicines as prescribed. Learn the names of your medicines, as well as how and when to use them.  · Oxygen therapy. Oxygen may be prescribed if tests show that your blood contains too little oxygen.  · Smoking. If you smoke, quit. Smoking is the main cause of COPD. Quitting will help you be able to better manage your COPD. Ask your healthcare provider about ways to help you quit smoking.  · Avoiding infections. Infections, like a cold or the flu, can cause your symptoms to worsen. Try to stay away from people who are sick. Wash your hands often. And, ask your healthcare provider about vaccines for the flu and  pneumonia.  Coping with shortness of breath  Coping tips include the following:  · Exercise. Try to be as active as possible. This will improve energy levels and strengthen your muscles, so you can do more.  · Breathing techniques. Ask your healthcare provider or nurse to show you how to do pursed-lip breathing.  · Balance rest and activity. Each day, try to balance rest periods with activity. For example, you might start the day with getting dressed and eating breakfast, then relax and read the paper. After that, take a brief walk. And then sit with your feet up for a while.  · Pulmonary rehabilitation. Ask your provider, or call your local hospital to find out about pulmonary rehab programs. The programs help with managing your disease, breathing techniques, exercise, support and counseling.  · Healthy eating. Eating a healthy, balanced diet and making an effort to maintain your ideal weight are important to staying as healthy as possible. Make sure you have a lot of fruit and vegetables every day, as well as balanced portions of whole grains, lean meats and fish, and low-fat dairy products.  Date Last Reviewed: 5/1/2016  © 0742-4719 [employer]. [address] [place] [address]. All rights reserved. This information is not intended as a substitute for professional medical care. Always follow your healthcare professional's instructions.        Shoulder and Upper Back Stretch  To start, stand tall with your ears, shoulders, and hips in line. Your feet should be slightly apart, positioned just under your hips. Focus your eyes directly in front of you.  this position for a few seconds before starting your exercise. This helps increase your awareness of proper posture.          Reach overhead and slightly back with both arms. Keep your shoulders and neck aligned and your elbows behind your shoulders:  · With your palms facing the ceiling, turn your fingers inward.  · Take a deep breath.  Breathe out, and lower your elbows toward your buttocks. Hold for 5 seconds, then return to starting position.  · Repeat 3 times.  Date Last Reviewed: 8/16/2015  © 9858-9322 [employer]. [address] [place] [address]. All rights reserved. This information is not intended as a substitute for professional medical care. Always follow your healthcare professional's instructions.        Shoulder Clock Exercise    To start, stand tall with your ears, shoulders, and hips in line. Your feet should be slightly apart, positioned just under your hips. Focus your eyes directly in front of you.  this position for a few seconds before starting your exercise. This helps increase your awareness of proper posture.  · Imagine that your right shoulder is the center of a clock. With the outer point of your shoulder, roll it around to slowly trace the outer edge of the clock.  · Move clockwise first, then counterclockwise.  · Repeat 3 to 5 times. Switch shoulders.   Date Last Reviewed: 10/2/2015  © 4362-5925 [employer]. [address] [place] [address]. All rights reserved. This information is not intended as a substitute for professional medical care. Always follow your healthcare professional's instructions.        Shoulder Girdle Stretch    To start, sit in a chair with your feet flat on the floor. Your weight should be slightly forward so that youre balanced evenly on your buttocks. Relax your shoulders and keep your head level. Using a chair with arms may help you keep your balance:  · Place 1 hand on the outside elbow of the other arm.  · Pull the arm across your body. Hold for 30 to 60 seconds. Repeat once.  · Switch sides.  For your safety, check with your healthcare provider before starting an exercise program.   Date Last Reviewed: 8/16/2015  © 7433-0969 [employer]. [address] [place] [address]. All rights reserved. This information is not  intended as a substitute for professional medical care. Always follow your healthcare professional's instructions.        Shoulder Exercises    To start, sit in a chair with your feet flat on the floor. Your weight should be slightly forward so that youre balanced evenly on your buttocks. Relax your shoulders and keep your head level. Avoid arching your back or rounding your shoulders. Using a chair with arms may help you keep your balance.  · Raise your arms, elbows bent, to shoulder height.  · Slowly move your forearms together. Hold for 5 seconds.  · Return to starting position. Repeat 5 times.  Date Last Reviewed: 10/1/2015  © 8722-7778 [employer]. [address] [place] [address]. All rights reserved. This information is not intended as a substitute for professional medical care. Always follow your healthcare professional's instructions.        Shoulder Shrug Exercise    To start, sit in a chair with your feet flat on the floor. Shift your weight slightly forward to avoid rounding your back. Relax. Keep your ears, shoulders, and hips aligned:  · Raise both of your shoulders as high as you can, as if you were trying to touch them to your ears. Keep your head and neck still and relaxed.  · Hold for a count of 10. Release.  · Repeat 5 times.  For your safety, check with your healthcare provider before starting an exercise program.   Date Last Reviewed: 8/16/2015  © 9634-7880 [employer]. [address] [place] [address]. All rights reserved. This information is not intended as a substitute for professional medical care. Always follow your healthcare professional's instructions.        Shoulder Squeeze Exercise    To start, sit in a chair with your feet flat on the floor. Shift your weight slightly forward to avoid rounding your back. Relax. Keep your ears, shoulders, and hips aligned:  · Raise your arms to shoulder height, elbows bent and palms forward.  · Move your  arms back, squeezing your shoulder blades together.  · Hold for 10 seconds. Return to starting position.   · Repeat 5 times.   For your safety, check with your healthcare provider before starting an exercise program.   Date Last Reviewed: 8/16/2015 © 2000-2017 [employer]. [address] [place] [address]. All rights reserved. This information is not intended as a substitute for professional medical care. Always follow your healthcare professional's instructions.        Capsaicin topical cream, lotion, solution  What is this medicine?  CAPSAICIN (cap SAY sin) is a pain reliever. It is used to treat pain in muscles or joints.  How should I use this medicine?  Use this medicine on the skin. Do not take by mouth. Follow the directions on the package or prescription label. Rub into painful area until there is little or no visible medicine left on the skin surface. Wash hands with soap and water after applying. If you are using this medicine for pain in the hands, do not wash your hands for at least 30 minutes after using this medicine. After using this medicine do not use a bandage, a heating pad, or expose the affected area to direct sun. Use your medicine at regular intervals. Do not use your medicine more often than directed.  Talk to your pediatrician regarding the use of this medicine in children. Special care may be needed.  What side effects may I notice from receiving this medicine?  Side effects that you should report to your doctor or health care professional as soon as possible:  · allergic reactions like skin rash, itching or hives, swelling of the face, lips, or tongue  · burning pain, redness that does not go away  · cough  · skin sores or thinning  Side effects that usually do not require medical attention (report to your doctor or health care professional if they continue or are bothersome):  · mild stinging or warmth where used  What may interact with this medicine?  Interactions  are not expected. Do not use any other skin products on the affected area without asking your doctor or health care professional.  What if I miss a dose?  If you miss a dose, use it as soon as you can. If it is almost time for your next dose, use only that dose. Do not use double or extra doses.  Where should I keep my medicine?  Keep out of the reach of young children.  Store at room temperature, between 15 and 30 degrees C (59 and 86 degrees F). Do not freeze. Protect from light. Throw away any unused medicine after the expiration date.  What should I tell my health care provider before I take this medicine?  They need to know if you have any of these conditions:  · broken or irritated skin  · an unusual or allergic reaction to capsaicin, hot peppers, other medicines, foods, dyes, or preservatives  · pregnant or trying to get pregnant  · breast-feeding  What should I watch for while using this medicine?  Tell your doctor or healthcare professional if your symptoms do not start to get better or if they get worse.  NOTE:This sheet is a summary. It may not cover all possible information. If you have questions about this medicine, talk to your doctor, pharmacist, or health care provider. Copyright© 2017 [employer]

## 2018-12-10 LAB — HCV AB SERPL QL IA: NEGATIVE

## 2019-01-16 DIAGNOSIS — M25.511 CHRONIC RIGHT SHOULDER PAIN: ICD-10-CM

## 2019-01-16 DIAGNOSIS — G89.29 CHRONIC RIGHT SHOULDER PAIN: ICD-10-CM

## 2019-01-16 RX ORDER — CYCLOBENZAPRINE HCL 5 MG
5 TABLET ORAL 3 TIMES DAILY PRN
Qty: 30 TABLET | Refills: 0 | Status: SHIPPED | OUTPATIENT
Start: 2019-01-16 | End: 2019-03-07

## 2019-03-07 DIAGNOSIS — G89.29 CHRONIC RIGHT SHOULDER PAIN: ICD-10-CM

## 2019-03-07 DIAGNOSIS — M25.511 CHRONIC RIGHT SHOULDER PAIN: ICD-10-CM

## 2019-03-07 RX ORDER — CYCLOBENZAPRINE HCL 5 MG
5 TABLET ORAL 3 TIMES DAILY PRN
Qty: 30 TABLET | Refills: 0 | Status: SHIPPED | OUTPATIENT
Start: 2019-03-07 | End: 2019-11-04

## 2019-03-12 ENCOUNTER — OFFICE VISIT (OUTPATIENT)
Dept: INTERNAL MEDICINE | Facility: CLINIC | Age: 64
End: 2019-03-12
Payer: MEDICAID

## 2019-03-12 ENCOUNTER — LAB VISIT (OUTPATIENT)
Dept: LAB | Facility: HOSPITAL | Age: 64
End: 2019-03-12
Payer: MEDICAID

## 2019-03-12 VITALS
SYSTOLIC BLOOD PRESSURE: 128 MMHG | HEIGHT: 73 IN | BODY MASS INDEX: 21.42 KG/M2 | DIASTOLIC BLOOD PRESSURE: 88 MMHG | OXYGEN SATURATION: 92 % | WEIGHT: 161.63 LBS | HEART RATE: 89 BPM

## 2019-03-12 DIAGNOSIS — F17.210 SMOKING GREATER THAN 40 PACK YEARS: ICD-10-CM

## 2019-03-12 DIAGNOSIS — N40.1 URINARY HESITANCY DUE TO BENIGN PROSTATIC HYPERPLASIA: ICD-10-CM

## 2019-03-12 DIAGNOSIS — N40.0 ENLARGED PROSTATE: ICD-10-CM

## 2019-03-12 DIAGNOSIS — R39.11 URINARY HESITANCY: Primary | ICD-10-CM

## 2019-03-12 DIAGNOSIS — R39.11 URINARY HESITANCY DUE TO BENIGN PROSTATIC HYPERPLASIA: ICD-10-CM

## 2019-03-12 PROBLEM — Z00.00 HEALTHCARE MAINTENANCE: Status: ACTIVE | Noted: 2019-03-12

## 2019-03-12 LAB
BACTERIA #/AREA URNS AUTO: ABNORMAL /HPF
BILIRUB UR QL STRIP: NEGATIVE
CLARITY UR REFRACT.AUTO: CLEAR
COLOR UR AUTO: YELLOW
COMPLEXED PSA SERPL-MCNC: 14.8 NG/ML
GLUCOSE UR QL STRIP: NEGATIVE
HGB UR QL STRIP: ABNORMAL
HYALINE CASTS UR QL AUTO: 0 /LPF
KETONES UR QL STRIP: NEGATIVE
LEUKOCYTE ESTERASE UR QL STRIP: NEGATIVE
MICROSCOPIC COMMENT: ABNORMAL
NITRITE UR QL STRIP: NEGATIVE
PH UR STRIP: 6 [PH] (ref 5–8)
PROT UR QL STRIP: ABNORMAL
RBC #/AREA URNS AUTO: >100 /HPF (ref 0–4)
SP GR UR STRIP: 1.01 (ref 1–1.03)
SQUAMOUS #/AREA URNS AUTO: 0 /HPF
URN SPEC COLLECT METH UR: ABNORMAL
WBC #/AREA URNS AUTO: 0 /HPF (ref 0–5)

## 2019-03-12 PROCEDURE — 99213 OFFICE O/P EST LOW 20 MIN: CPT | Mod: S$PBB,,, | Performed by: STUDENT IN AN ORGANIZED HEALTH CARE EDUCATION/TRAINING PROGRAM

## 2019-03-12 PROCEDURE — 36415 COLL VENOUS BLD VENIPUNCTURE: CPT

## 2019-03-12 PROCEDURE — 99213 PR OFFICE/OUTPT VISIT, EST, LEVL III, 20-29 MIN: ICD-10-PCS | Mod: S$PBB,,, | Performed by: STUDENT IN AN ORGANIZED HEALTH CARE EDUCATION/TRAINING PROGRAM

## 2019-03-12 PROCEDURE — 99213 OFFICE O/P EST LOW 20 MIN: CPT | Mod: PBBFAC | Performed by: STUDENT IN AN ORGANIZED HEALTH CARE EDUCATION/TRAINING PROGRAM

## 2019-03-12 PROCEDURE — 99999 PR PBB SHADOW E&M-EST. PATIENT-LVL III: ICD-10-PCS | Mod: PBBFAC,,, | Performed by: STUDENT IN AN ORGANIZED HEALTH CARE EDUCATION/TRAINING PROGRAM

## 2019-03-12 PROCEDURE — 81001 URINALYSIS AUTO W/SCOPE: CPT

## 2019-03-12 PROCEDURE — 99999 PR PBB SHADOW E&M-EST. PATIENT-LVL III: CPT | Mod: PBBFAC,,, | Performed by: STUDENT IN AN ORGANIZED HEALTH CARE EDUCATION/TRAINING PROGRAM

## 2019-03-12 PROCEDURE — 84153 ASSAY OF PSA TOTAL: CPT

## 2019-03-12 RX ORDER — TAMSULOSIN HYDROCHLORIDE 0.4 MG/1
0.4 CAPSULE ORAL DAILY
Qty: 30 CAPSULE | Refills: 2 | Status: SHIPPED | OUTPATIENT
Start: 2019-03-12 | End: 2019-09-24
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
## 2019-03-14 ENCOUNTER — TELEPHONE (OUTPATIENT)
Dept: INTERNAL MEDICINE | Facility: CLINIC | Age: 64
End: 2019-03-14

## 2019-03-14 DIAGNOSIS — R97.20 HIGH PROSTATE SPECIFIC ANTIGEN (PSA): Primary | ICD-10-CM

## 2019-03-14 DIAGNOSIS — R39.11 URINARY HESITANCY: ICD-10-CM

## 2019-03-14 DIAGNOSIS — R31.9 HEMATURIA OF UNDIAGNOSED CAUSE: ICD-10-CM

## 2019-03-14 NOTE — TELEPHONE ENCOUNTER
----- Message from Adalgisa Salgado sent at 3/14/2019  9:42 AM CDT -----  Contact: self/[phone]  Cc: Domi Whiting MD  Patient is returning a phone call.  Who left a message for the patient: Dr Whiting  Does patient know what this is regarding:  appointment  Comments:

## 2019-03-14 NOTE — PROGRESS NOTES
64 y/o with COPD who presents with complaints of LUTS sx's. Reports has frequency, hesitancy for several months. No dysuria. Goes to bathroom twice at night. No blood in urine or semen. No family hx of prostate cancer.     I-PSS score 20.      On my exam with at least 4 finger width prostate but I was unable to palpate entire prostate. No discrete nodules noted. PSA elevated. Will refer to urology for further workup, appreciate help. Start flomax today.     Rest of plan per PGY1 note.     Reed Andrea MD  Department of [facility]  [facility]  [phone]

## 2019-04-09 DIAGNOSIS — F17.210 SMOKING GREATER THAN 40 PACK YEARS: Primary | ICD-10-CM

## 2019-04-09 DIAGNOSIS — R06.09 DOE (DYSPNEA ON EXERTION): ICD-10-CM

## 2019-04-10 ENCOUNTER — HOSPITAL ENCOUNTER (OUTPATIENT)
Dept: PULMONOLOGY | Facility: CLINIC | Age: 64
Discharge: HOME | End: 2019-04-10
Payer: MEDICAID

## 2019-04-10 ENCOUNTER — OFFICE VISIT (OUTPATIENT)
Dept: PULMONOLOGY | Facility: CLINIC | Age: 64
End: 2019-04-10
Payer: MEDICAID

## 2019-04-10 ENCOUNTER — HOSPITAL ENCOUNTER (OUTPATIENT)
Dept: RADIOLOGY | Facility: HOSPITAL | Age: 64
Discharge: HOME | End: 2019-04-10
Attending: INTERNAL MEDICINE
Payer: MEDICAID

## 2019-04-10 VITALS
HEART RATE: 69 BPM | SYSTOLIC BLOOD PRESSURE: 126 MMHG | BODY MASS INDEX: 20.94 KG/M2 | OXYGEN SATURATION: 96 % | WEIGHT: 158 LBS | DIASTOLIC BLOOD PRESSURE: 70 MMHG | HEIGHT: 73 IN

## 2019-04-10 DIAGNOSIS — R06.09 DOE (DYSPNEA ON EXERTION): Primary | ICD-10-CM

## 2019-04-10 DIAGNOSIS — R06.09 DOE (DYSPNEA ON EXERTION): ICD-10-CM

## 2019-04-10 DIAGNOSIS — J44.1 COPD EXACERBATION: Primary | ICD-10-CM

## 2019-04-10 DIAGNOSIS — F17.210 SMOKING GREATER THAN 40 PACK YEARS: ICD-10-CM

## 2019-04-10 LAB
PRE FEV1 FVC: 38
PRE FEV1: 1
PRE FVC: 2.61
PREDICTED FEV1 FVC: 78
PREDICTED FEV1: 3.96
PREDICTED FVC: 4.95

## 2019-04-10 PROCEDURE — 99213 OFFICE O/P EST LOW 20 MIN: CPT | Mod: PBBFAC,25 | Performed by: INTERNAL MEDICINE

## 2019-04-10 PROCEDURE — 71046 X-RAY EXAM CHEST 2 VIEWS: CPT | Mod: 26,,, | Performed by: RADIOLOGY

## 2019-04-10 PROCEDURE — 99204 PR OFFICE/OUTPT VISIT, NEW, LEVL IV, 45-59 MIN: ICD-10-PCS | Mod: 25,S$PBB,, | Performed by: INTERNAL MEDICINE

## 2019-04-10 PROCEDURE — 94729 DIFFUSING CAPACITY: CPT | Mod: PBBFAC | Performed by: INTERNAL MEDICINE

## 2019-04-10 PROCEDURE — 94729 PR C02/MEMBANE DIFFUSE CAPACITY: ICD-10-PCS | Mod: 26,S$PBB,, | Performed by: INTERNAL MEDICINE

## 2019-04-10 PROCEDURE — 99204 OFFICE O/P NEW MOD 45 MIN: CPT | Mod: 25,S$PBB,, | Performed by: INTERNAL MEDICINE

## 2019-04-10 PROCEDURE — 71046 X-RAY EXAM CHEST 2 VIEWS: CPT | Mod: TC,FY

## 2019-04-10 PROCEDURE — 99999 PR PBB SHADOW E&M-EST. PATIENT-LVL III: ICD-10-PCS | Mod: PBBFAC,,, | Performed by: INTERNAL MEDICINE

## 2019-04-10 PROCEDURE — 94729 DIFFUSING CAPACITY: CPT | Mod: 26,S$PBB,, | Performed by: INTERNAL MEDICINE

## 2019-04-10 PROCEDURE — 94010 BREATHING CAPACITY TEST: CPT | Mod: 26,S$PBB,, | Performed by: INTERNAL MEDICINE

## 2019-04-10 PROCEDURE — 71046 XR CHEST PA AND LATERAL: ICD-10-PCS | Mod: 26,,, | Performed by: RADIOLOGY

## 2019-04-10 PROCEDURE — 94010 BREATHING CAPACITY TEST: CPT | Mod: PBBFAC | Performed by: INTERNAL MEDICINE

## 2019-04-10 PROCEDURE — 94010 BREATHING CAPACITY TEST: ICD-10-PCS | Mod: 26,S$PBB,, | Performed by: INTERNAL MEDICINE

## 2019-04-10 PROCEDURE — 99999 PR PBB SHADOW E&M-EST. PATIENT-LVL III: CPT | Mod: PBBFAC,,, | Performed by: INTERNAL MEDICINE

## 2019-04-10 NOTE — LETTER
April 11, 2019      Domi Whiting MD  [address] [facility] [address]           [facility]  [address]  [facility] [address]  Phone: [phone]          Patient: Avni Reyes   MR Number: [id number]   YOB: 1955   Date of Visit: 4/10/2019       Dear Dr. Domi Whiting:    Thank you for referring Avni Reyes to me for evaluation. Attached you will find relevant portions of my assessment and plan of care.    If you have questions, please do not hesitate to call me. I look forward to following Avni Reyes along with you.    Sincerely,    Terrell Armijo MD    Enclosure  CC:  No Recipients    If you would like to receive this communication electronically, please contact [email or web address] or [phone] to request more information on [facility] access.    For providers and/or their staff who would like to refer a patient to [facility], please contact us through our one-stop-shop provider referral line, [facility] , at [phone].    If you feel you have received this communication in error or would no longer like to receive these types of communications, please e-mail [email or web address]

## 2019-04-11 NOTE — PROGRESS NOTES
Subjective:      Patient ID: Avni Reyes is a 63 y.o. male.    Chief Complaint: COPD    HPI  62 yo male comes for assessment of exetional dyspnea secondary to COPD. He is an active smoker and would like to quit. Using a combivent inhaler bid. Last had a COPD exacerbation several years ago while incarcerated in [place]. No known diagnosis of tuberculosis. Today's film is compatible with severe hyperinflation secondary to COPD. Last film was done in Jan. 2018 PFT's today shows severe obstruction Fev-1: 1.0 liters 38 % and DLCO:52%;  Values Consistent with emphysema. His Sa02 is 96%  (Pink Puffer)  Review of Systems   Constitutional: Negative.    HENT: Negative.    Eyes: Negative.    Respiratory: Negative.  Positive for dyspnea on extertion.         Smoker     PFT's and x-rays are consistent with pulmonary emphysema.   Cardiovascular: Negative.    Genitourinary: Negative.    Musculoskeletal: Negative.    Skin: Negative.    Gastrointestinal: Negative.    Neurological: Negative.    Psychiatric/Behavioral: Negative.      Objective:     Physical Exam   Constitutional: He is oriented to person, place, and time. He appears well-developed and well-nourished.   HENT:   Head: Normocephalic and atraumatic.   Right Ear: External ear normal.   Left Ear: External ear normal.   Eyes: Pupils are equal, round, and reactive to light. Conjunctivae and EOM are normal.   Neck: Normal range of motion. Neck supple.   Cardiovascular: Normal rate, regular rhythm and normal heart sounds.   Pulmonary/Chest: Effort normal and breath sounds normal.   Decreased air entry through out. Sa02: 96%   Abdominal: Soft. Bowel sounds are normal.   Musculoskeletal: Normal range of motion.   Neurological: He is alert and oriented to person, place, and time. He has normal reflexes.   Skin: Skin is warm and dry.   Psychiatric: He has a normal mood and affect. His behavior is normal. Judgment and thought content normal.       Assessment:     1. COPD  exacerbation      Outpatient Encounter Medications as of 4/10/2019   Medication Sig Dispense Refill    albuterol (PROVENTIL HFA) 90 mcg/actuation inhaler Inhale 2 puffs into the lungs every 6 (six) hours as needed for Wheezing. Rescue 18 g 0    fluticasone (FLOVENT HFA) 110 mcg/actuation inhaler Inhale 1 puff into the lungs 2 (two) times daily. Controller 12 g 2    fluticasone-salmeterol (ADVAIR HFA) 45-21 mcg/actuation HFAA Inhale 1 puff into the lungs 2 (two) times daily. Controller 1 Inhaler 0    ipratropium-albuterol (COMBIVENT RESPIMAT)  mcg/actuation inhaler Inhale 1 puff by mouth once daily 4 g 12    ipratropium-albuterol (COMBIVENT)  mcg/actuation inhaler Inhale 1 puff into the lungs every 6 (six) hours as needed for Wheezing or Shortness of Breath. Rescue 2 Package 11    omeprazole (PRILOSEC) 20 MG capsule Take 1 capsule (20 mg total) by mouth once daily. 30 capsule 12    omeprazole 20 mg TbEC Take 20 mg by mouth once daily. 30 each 11    tamsulosin (FLOMAX) 0.4 mg Cap Take 1 capsule (0.4 mg total) by mouth once daily. 30 capsule 2    tiotropium (SPIRIVA) 18 mcg inhalation capsule Inhale 1 capsule (18 mcg total) into the lungs once daily. Controller 360 capsule 0     No facility-administered encounter medications on file as of 4/10/2019.      Orders Placed This Encounter   Procedures    X-Ray Chest PA And Lateral     Standing Status:   Future     Standing Expiration Date:   4/9/2020     Plan:   Have referred to nonsmoking clinic to start cessation process. Continue his current bronchodilators.  Problem List Items Addressed This Visit     None      Visit Diagnoses     COPD exacerbation    -  Primary    Relevant Orders    X-Ray Chest PA And Lateral

## 2019-04-16 ENCOUNTER — TELEPHONE (OUTPATIENT)
Dept: SMOKING CESSATION | Facility: CLINIC | Age: 64
End: 2019-04-16

## 2019-04-18 DIAGNOSIS — J44.1 COPD WITH ACUTE EXACERBATION: ICD-10-CM

## 2019-04-18 RX ORDER — DEXAMETHASONE 4 MG/1
TABLET ORAL
Qty: 12 G | Refills: 11 | Status: SHIPPED | OUTPATIENT
Start: 2019-04-18 | End: 2021-10-19 | Stop reason: SDUPTHER

## 2019-04-22 ENCOUNTER — TELEPHONE (OUTPATIENT)
Dept: SMOKING CESSATION | Facility: CLINIC | Age: 64
End: 2019-04-22

## 2019-04-22 NOTE — TELEPHONE ENCOUNTER
Left message with my name Delores Newton and phone number [phone] about missed appointment to start the tobacco cessation program.

## 2019-06-17 PROBLEM — Z00.00 HEALTHCARE MAINTENANCE: Status: RESOLVED | Noted: 2019-03-12 | Resolved: 2019-06-17

## 2019-07-06 ENCOUNTER — TELEPHONE (OUTPATIENT)
Dept: ENDOSCOPY | Facility: HOSPITAL | Age: 64
End: 2019-07-06

## 2019-09-24 DIAGNOSIS — R39.11 URINARY HESITANCY: ICD-10-CM

## 2019-09-24 RX ORDER — TAMSULOSIN HYDROCHLORIDE 0.4 MG/1
0.4 CAPSULE ORAL DAILY
Qty: 30 CAPSULE | Refills: 2 | Status: SHIPPED | OUTPATIENT
Start: 2019-09-24 | End: 2020-01-15

## 2019-11-04 DIAGNOSIS — M25.511 CHRONIC RIGHT SHOULDER PAIN: ICD-10-CM

## 2019-11-04 DIAGNOSIS — G89.29 CHRONIC RIGHT SHOULDER PAIN: ICD-10-CM

## 2019-11-04 RX ORDER — CYCLOBENZAPRINE HCL 5 MG
5 TABLET ORAL 3 TIMES DAILY PRN
Qty: 30 TABLET | Refills: 0 | Status: SHIPPED | OUTPATIENT
Start: 2019-11-04 | End: 2019-12-01

## 2019-12-16 DIAGNOSIS — J44.9 CHRONIC OBSTRUCTIVE PULMONARY DISEASE, UNSPECIFIED COPD TYPE: ICD-10-CM

## 2019-12-17 RX ORDER — TIOTROPIUM BROMIDE 18 UG/1
18 CAPSULE ORAL; RESPIRATORY (INHALATION) DAILY
Qty: 360 CAPSULE | Refills: 0 | Status: SHIPPED | OUTPATIENT
Start: 2019-12-17 | End: 2020-12-21 | Stop reason: SDUPTHER

## 2020-01-15 DIAGNOSIS — R39.11 URINARY HESITANCY: ICD-10-CM

## 2020-01-15 RX ORDER — OMEPRAZOLE 20 MG/1
20 CAPSULE, DELAYED RELEASE ORAL DAILY
Qty: 30 CAPSULE | Refills: 12 | Status: SHIPPED | OUTPATIENT
Start: 2020-01-15 | End: 2021-01-22 | Stop reason: SDUPTHER

## 2020-01-15 RX ORDER — TAMSULOSIN HYDROCHLORIDE 0.4 MG/1
0.4 CAPSULE ORAL DAILY
Qty: 30 CAPSULE | Refills: 2 | Status: SHIPPED | OUTPATIENT
Start: 2020-01-15 | End: 2020-08-03 | Stop reason: SDUPTHER

## 2020-08-03 DIAGNOSIS — R39.11 URINARY HESITANCY: ICD-10-CM

## 2020-08-03 RX ORDER — TAMSULOSIN HYDROCHLORIDE 0.4 MG/1
0.4 CAPSULE ORAL DAILY
Qty: 30 CAPSULE | Refills: 2 | Status: CANCELLED | OUTPATIENT
Start: 2020-08-03 | End: 2020-09-02

## 2020-08-03 RX ORDER — TAMSULOSIN HYDROCHLORIDE 0.4 MG/1
0.4 CAPSULE ORAL DAILY
Qty: 30 CAPSULE | Refills: 0 | Status: SHIPPED | OUTPATIENT
Start: 2020-08-03 | End: 2020-09-21 | Stop reason: SDUPTHER

## 2020-09-21 DIAGNOSIS — R39.11 URINARY HESITANCY: ICD-10-CM

## 2020-09-21 RX ORDER — TAMSULOSIN HYDROCHLORIDE 0.4 MG/1
0.4 CAPSULE ORAL DAILY
Qty: 30 CAPSULE | Refills: 0 | Status: SHIPPED | OUTPATIENT
Start: 2020-09-21 | End: 2020-11-25 | Stop reason: SDUPTHER

## 2020-09-21 RX ORDER — IPRATROPIUM BROMIDE AND ALBUTEROL 20; 100 UG/1; UG/1
SPRAY, METERED RESPIRATORY (INHALATION)
Qty: 4 G | Refills: 12 | Status: SHIPPED | OUTPATIENT
Start: 2020-09-21 | End: 2021-08-19 | Stop reason: SDUPTHER

## 2020-11-25 DIAGNOSIS — R39.11 URINARY HESITANCY: ICD-10-CM

## 2020-11-25 RX ORDER — TAMSULOSIN HYDROCHLORIDE 0.4 MG/1
0.4 CAPSULE ORAL DAILY
Qty: 30 CAPSULE | Refills: 0 | Status: SHIPPED | OUTPATIENT
Start: 2020-11-25 | End: 2021-01-22 | Stop reason: SDUPTHER

## 2020-12-21 DIAGNOSIS — J44.9 CHRONIC OBSTRUCTIVE PULMONARY DISEASE, UNSPECIFIED COPD TYPE: ICD-10-CM

## 2020-12-23 RX ORDER — TIOTROPIUM BROMIDE 18 UG/1
18 CAPSULE ORAL; RESPIRATORY (INHALATION) DAILY
Qty: 360 CAPSULE | Refills: 0 | Status: SHIPPED | OUTPATIENT
Start: 2020-12-23 | End: 2021-10-08 | Stop reason: SDUPTHER

## 2021-01-22 DIAGNOSIS — R39.11 URINARY HESITANCY: ICD-10-CM

## 2021-01-22 RX ORDER — OMEPRAZOLE 20 MG/1
20 CAPSULE, DELAYED RELEASE ORAL DAILY
Qty: 30 CAPSULE | Refills: 12 | Status: SHIPPED | OUTPATIENT
Start: 2021-01-22

## 2021-01-22 RX ORDER — TAMSULOSIN HYDROCHLORIDE 0.4 MG/1
0.4 CAPSULE ORAL DAILY
Qty: 30 CAPSULE | Refills: 0 | Status: SHIPPED | OUTPATIENT
Start: 2021-01-22 | End: 2021-04-05 | Stop reason: SDUPTHER

## 2021-04-05 DIAGNOSIS — R39.11 URINARY HESITANCY: ICD-10-CM

## 2021-04-06 RX ORDER — TAMSULOSIN HYDROCHLORIDE 0.4 MG/1
0.4 CAPSULE ORAL DAILY
Qty: 30 CAPSULE | Refills: 0 | Status: SHIPPED | OUTPATIENT
Start: 2021-04-06 | End: 2021-05-19 | Stop reason: SDUPTHER

## 2021-05-19 DIAGNOSIS — R39.11 URINARY HESITANCY: ICD-10-CM

## 2021-05-19 RX ORDER — TAMSULOSIN HYDROCHLORIDE 0.4 MG/1
0.4 CAPSULE ORAL DAILY
Qty: 30 CAPSULE | Refills: 0 | Status: CANCELLED | OUTPATIENT
Start: 2021-05-19 | End: 2021-06-18

## 2021-05-20 DIAGNOSIS — R39.11 URINARY HESITANCY: ICD-10-CM

## 2021-05-20 RX ORDER — TAMSULOSIN HYDROCHLORIDE 0.4 MG/1
0.4 CAPSULE ORAL DAILY
Qty: 30 CAPSULE | Refills: 0 | Status: CANCELLED | OUTPATIENT
Start: 2021-05-19 | End: 2021-06-18

## 2021-05-21 DIAGNOSIS — R39.11 URINARY HESITANCY: ICD-10-CM

## 2021-05-24 RX ORDER — TAMSULOSIN HYDROCHLORIDE 0.4 MG/1
0.4 CAPSULE ORAL DAILY
Qty: 30 CAPSULE | Refills: 0 | Status: SHIPPED | OUTPATIENT
Start: 2021-05-24 | End: 2021-08-19 | Stop reason: SDUPTHER

## 2021-08-19 DIAGNOSIS — R39.11 URINARY HESITANCY: ICD-10-CM

## 2021-08-19 DIAGNOSIS — R06.09 DOE (DYSPNEA ON EXERTION): Primary | ICD-10-CM

## 2021-08-19 RX ORDER — IPRATROPIUM BROMIDE AND ALBUTEROL 20; 100 UG/1; UG/1
SPRAY, METERED RESPIRATORY (INHALATION)
Qty: 4 G | Refills: 12 | Status: CANCELLED | OUTPATIENT
Start: 2021-08-19

## 2021-08-19 RX ORDER — IPRATROPIUM BROMIDE AND ALBUTEROL 20; 100 UG/1; UG/1
SPRAY, METERED RESPIRATORY (INHALATION)
Qty: 4 G | Refills: 2 | Status: SHIPPED | OUTPATIENT
Start: 2021-08-19 | End: 2021-10-08 | Stop reason: SDUPTHER

## 2021-08-19 RX ORDER — TAMSULOSIN HYDROCHLORIDE 0.4 MG/1
0.4 CAPSULE ORAL DAILY
Qty: 30 CAPSULE | Refills: 2 | Status: ON HOLD | OUTPATIENT
Start: 2021-08-19 | End: 2022-05-30 | Stop reason: HOSPADM

## 2021-10-11 RX ORDER — IPRATROPIUM BROMIDE AND ALBUTEROL 20; 100 UG/1; UG/1
SPRAY, METERED RESPIRATORY (INHALATION)
Qty: 4 G | Refills: 0 | Status: SHIPPED | OUTPATIENT
Start: 2021-10-11 | End: 2021-11-05 | Stop reason: SDUPTHER

## 2021-10-19 ENCOUNTER — LAB VISIT (OUTPATIENT)
Dept: LAB | Facility: HOSPITAL | Age: 66
End: 2021-10-19
Payer: MEDICAID

## 2021-10-19 ENCOUNTER — OFFICE VISIT (OUTPATIENT)
Dept: INTERNAL MEDICINE | Facility: CLINIC | Age: 66
End: 2021-10-19
Payer: MEDICAID

## 2021-10-19 VITALS
BODY MASS INDEX: 19.51 KG/M2 | OXYGEN SATURATION: 95 % | DIASTOLIC BLOOD PRESSURE: 78 MMHG | WEIGHT: 147.25 LBS | SYSTOLIC BLOOD PRESSURE: 128 MMHG | HEIGHT: 73 IN | HEART RATE: 113 BPM

## 2021-10-19 DIAGNOSIS — R97.20 ELEVATED PROSTATE SPECIFIC ANTIGEN (PSA): ICD-10-CM

## 2021-10-19 DIAGNOSIS — Z12.11 COLON CANCER SCREENING: Primary | ICD-10-CM

## 2021-10-19 DIAGNOSIS — R79.89 LOW VITAMIN D LEVEL: ICD-10-CM

## 2021-10-19 DIAGNOSIS — N40.0 BENIGN PROSTATIC HYPERPLASIA WITHOUT URINARY OBSTRUCTION: ICD-10-CM

## 2021-10-19 DIAGNOSIS — R97.20 ELEVATED PSA: ICD-10-CM

## 2021-10-19 DIAGNOSIS — F17.210 NICOTINE DEPENDENCE, CIGARETTES, UNCOMPLICATED: ICD-10-CM

## 2021-10-19 DIAGNOSIS — Z12.2 SCREENING FOR LUNG CANCER: ICD-10-CM

## 2021-10-19 DIAGNOSIS — Z00.01 ANNUAL VISIT FOR GENERAL ADULT MEDICAL EXAMINATION WITH ABNORMAL FINDINGS: ICD-10-CM

## 2021-10-19 DIAGNOSIS — F17.210 SMOKING GREATER THAN 40 PACK YEARS: ICD-10-CM

## 2021-10-19 DIAGNOSIS — Z00.00 ROUTINE ADULT HEALTH MAINTENANCE: ICD-10-CM

## 2021-10-19 DIAGNOSIS — J44.9 CHRONIC OBSTRUCTIVE PULMONARY DISEASE, UNSPECIFIED COPD TYPE: ICD-10-CM

## 2021-10-19 DIAGNOSIS — Z13.6 ENCOUNTER FOR ABDOMINAL AORTIC ANEURYSM (AAA) SCREENING: ICD-10-CM

## 2021-10-19 DIAGNOSIS — J44.1 COPD WITH ACUTE EXACERBATION: ICD-10-CM

## 2021-10-19 LAB
25(OH)D3+25(OH)D2 SERPL-MCNC: 22 NG/ML (ref 30–96)
ALBUMIN SERPL BCP-MCNC: 3.5 G/DL (ref 3.5–5.2)
ALP SERPL-CCNC: 55 U/L (ref 55–135)
ALT SERPL W/O P-5'-P-CCNC: 11 U/L (ref 10–44)
ANION GAP SERPL CALC-SCNC: 11 MMOL/L (ref 8–16)
AST SERPL-CCNC: 21 U/L (ref 10–40)
BASOPHILS # BLD AUTO: 0.04 K/UL (ref 0–0.2)
BASOPHILS NFR BLD: 1.3 % (ref 0–1.9)
BILIRUB SERPL-MCNC: 0.6 MG/DL (ref 0.1–1)
BUN SERPL-MCNC: 7 MG/DL (ref 8–23)
CALCIUM SERPL-MCNC: 9.6 MG/DL (ref 8.7–10.5)
CHLORIDE SERPL-SCNC: 100 MMOL/L (ref 95–110)
CO2 SERPL-SCNC: 29 MMOL/L (ref 23–29)
COMPLEXED PSA SERPL-MCNC: 25 NG/ML (ref 0–4)
CREAT SERPL-MCNC: 1 MG/DL (ref 0.5–1.4)
DIFFERENTIAL METHOD: ABNORMAL
EOSINOPHIL # BLD AUTO: 0.1 K/UL (ref 0–0.5)
EOSINOPHIL NFR BLD: 2 % (ref 0–8)
ERYTHROCYTE [DISTWIDTH] IN BLOOD BY AUTOMATED COUNT: 14.2 % (ref 11.5–14.5)
EST. GFR  (AFRICAN AMERICAN): >60 ML/MIN/1.73 M^2
EST. GFR  (NON AFRICAN AMERICAN): >60 ML/MIN/1.73 M^2
ESTIMATED AVG GLUCOSE: 111 MG/DL (ref 68–131)
GLUCOSE SERPL-MCNC: 63 MG/DL (ref 70–110)
HBA1C MFR BLD: 5.5 % (ref 4–5.6)
HCT VFR BLD AUTO: 43.4 % (ref 40–54)
HGB BLD-MCNC: 14.2 G/DL (ref 14–18)
IMM GRANULOCYTES # BLD AUTO: 0.01 K/UL (ref 0–0.04)
IMM GRANULOCYTES NFR BLD AUTO: 0.3 % (ref 0–0.5)
LYMPHOCYTES # BLD AUTO: 1.2 K/UL (ref 1–4.8)
LYMPHOCYTES NFR BLD: 38.5 % (ref 18–48)
MCH RBC QN AUTO: 33.1 PG (ref 27–31)
MCHC RBC AUTO-ENTMCNC: 32.7 G/DL (ref 32–36)
MCV RBC AUTO: 101 FL (ref 82–98)
MONOCYTES # BLD AUTO: 0.3 K/UL (ref 0.3–1)
MONOCYTES NFR BLD: 9.3 % (ref 4–15)
NEUTROPHILS # BLD AUTO: 1.5 K/UL (ref 1.8–7.7)
NEUTROPHILS NFR BLD: 48.6 % (ref 38–73)
NRBC BLD-RTO: 0 /100 WBC
PLATELET # BLD AUTO: 227 K/UL (ref 150–450)
PMV BLD AUTO: 10.9 FL (ref 9.2–12.9)
POTASSIUM SERPL-SCNC: 3.8 MMOL/L (ref 3.5–5.1)
PROT SERPL-MCNC: 6.7 G/DL (ref 6–8.4)
RBC # BLD AUTO: 4.29 M/UL (ref 4.6–6.2)
SODIUM SERPL-SCNC: 140 MMOL/L (ref 136–145)
WBC # BLD AUTO: 3.01 K/UL (ref 3.9–12.7)

## 2021-10-19 PROCEDURE — 80053 COMPREHEN METABOLIC PANEL: CPT

## 2021-10-19 PROCEDURE — 36415 COLL VENOUS BLD VENIPUNCTURE: CPT

## 2021-10-19 PROCEDURE — 84153 ASSAY OF PSA TOTAL: CPT

## 2021-10-19 PROCEDURE — 99214 PR OFFICE/OUTPT VISIT, EST, LEVL IV, 30-39 MIN: ICD-10-PCS | Mod: S$PBB,,,

## 2021-10-19 PROCEDURE — 99214 OFFICE O/P EST MOD 30 MIN: CPT | Mod: S$PBB,,,

## 2021-10-19 PROCEDURE — 83036 HEMOGLOBIN GLYCOSYLATED A1C: CPT

## 2021-10-19 PROCEDURE — 85025 COMPLETE CBC W/AUTO DIFF WBC: CPT

## 2021-10-19 PROCEDURE — 99215 OFFICE O/P EST HI 40 MIN: CPT | Mod: PBBFAC

## 2021-10-19 PROCEDURE — 99999 PR PBB SHADOW E&M-EST. PATIENT-LVL V: CPT | Mod: PBBFAC,,,

## 2021-10-19 PROCEDURE — 99999 PR PBB SHADOW E&M-EST. PATIENT-LVL V: ICD-10-PCS | Mod: PBBFAC,,,

## 2021-10-19 PROCEDURE — 82306 VITAMIN D 25 HYDROXY: CPT

## 2021-10-19 RX ORDER — DEXAMETHASONE 4 MG/1
1 TABLET ORAL 2 TIMES DAILY
Qty: 12 G | Refills: 11 | Status: SHIPPED | OUTPATIENT
Start: 2021-10-19 | End: 2022-04-22

## 2021-10-20 PROBLEM — R97.20 ELEVATED PSA: Status: ACTIVE | Noted: 2021-10-20

## 2021-10-20 PROBLEM — J44.9 CHRONIC OBSTRUCTIVE PULMONARY DISEASE: Status: ACTIVE | Noted: 2021-10-20

## 2021-10-20 PROBLEM — Z72.0 TOBACCO USER: Status: ACTIVE | Noted: 2021-10-20

## 2021-10-20 PROBLEM — K21.00 GASTROESOPHAGEAL REFLUX DISEASE WITH ESOPHAGITIS: Status: ACTIVE | Noted: 2021-10-20

## 2021-10-20 PROBLEM — R03.0 FINDING OF ABOVE NORMAL BLOOD PRESSURE: Status: ACTIVE | Noted: 2021-10-20

## 2021-10-20 PROBLEM — R79.89 LOW VITAMIN D LEVEL: Status: ACTIVE | Noted: 2021-10-20

## 2021-10-20 PROBLEM — N40.0 BENIGN PROSTATIC HYPERPLASIA WITHOUT URINARY OBSTRUCTION: Status: ACTIVE | Noted: 2021-10-20

## 2021-10-20 PROBLEM — J44.9 COPD (CHRONIC OBSTRUCTIVE PULMONARY DISEASE): Status: ACTIVE | Noted: 2021-10-20

## 2021-10-29 DIAGNOSIS — J44.9 CHRONIC OBSTRUCTIVE PULMONARY DISEASE, UNSPECIFIED COPD TYPE: ICD-10-CM

## 2021-10-31 RX ORDER — TIOTROPIUM BROMIDE 18 UG/1
18 CAPSULE ORAL; RESPIRATORY (INHALATION) DAILY
Qty: 360 CAPSULE | Refills: 0 | Status: CANCELLED | OUTPATIENT
Start: 2021-10-31 | End: 2022-10-31

## 2021-11-03 DIAGNOSIS — J44.9 CHRONIC OBSTRUCTIVE PULMONARY DISEASE, UNSPECIFIED COPD TYPE: ICD-10-CM

## 2021-11-04 RX ORDER — TIOTROPIUM BROMIDE 18 UG/1
18 CAPSULE ORAL; RESPIRATORY (INHALATION) DAILY
Qty: 360 CAPSULE | Refills: 0 | Status: SHIPPED | OUTPATIENT
Start: 2021-11-04 | End: 2022-05-30

## 2021-11-05 DIAGNOSIS — J44.1 COPD WITH ACUTE EXACERBATION: Primary | ICD-10-CM

## 2021-11-05 DIAGNOSIS — J44.1 COPD EXACERBATION: ICD-10-CM

## 2021-11-05 RX ORDER — IPRATROPIUM BROMIDE AND ALBUTEROL 20; 100 UG/1; UG/1
SPRAY, METERED RESPIRATORY (INHALATION)
Qty: 4 G | Refills: 11 | Status: ON HOLD | OUTPATIENT
Start: 2021-11-05 | End: 2022-05-30 | Stop reason: HOSPADM

## 2021-11-08 ENCOUNTER — HOSPITAL ENCOUNTER (OUTPATIENT)
Dept: VASCULAR SURGERY | Facility: CLINIC | Age: 66
Discharge: HOME | End: 2021-11-08
Payer: MEDICAID

## 2021-11-08 ENCOUNTER — HOSPITAL ENCOUNTER (OUTPATIENT)
Dept: RADIOLOGY | Facility: HOSPITAL | Age: 66
Discharge: HOME | End: 2021-11-08
Payer: MEDICAID

## 2021-11-08 DIAGNOSIS — F17.210 NICOTINE DEPENDENCE, CIGARETTES, UNCOMPLICATED: ICD-10-CM

## 2021-11-08 DIAGNOSIS — Z12.2 SCREENING FOR LUNG CANCER: ICD-10-CM

## 2021-11-08 DIAGNOSIS — Z13.6 ENCOUNTER FOR ABDOMINAL AORTIC ANEURYSM (AAA) SCREENING: ICD-10-CM

## 2021-11-08 PROCEDURE — 71271 CT THORAX LUNG CANCER SCR C-: CPT | Mod: TC

## 2021-11-08 PROCEDURE — 76706 US ABDL AORTA SCREEN AAA: CPT | Mod: PBBFAC | Performed by: SURGERY

## 2021-11-08 PROCEDURE — 71271 CT THORAX LUNG CANCER SCR C-: CPT | Mod: 26,,, | Performed by: RADIOLOGY

## 2021-11-08 PROCEDURE — 71271 CT CHEST LUNG SCREENING LOW DOSE: ICD-10-PCS | Mod: 26,,, | Performed by: RADIOLOGY

## 2021-11-08 PROCEDURE — 76706 PR US, AAA, SCREENING: ICD-10-PCS | Mod: 26,S$PBB,, | Performed by: SURGERY

## 2021-11-08 PROCEDURE — 76706 US ABDL AORTA SCREEN AAA: CPT | Mod: 26,S$PBB,, | Performed by: SURGERY

## 2021-11-29 DIAGNOSIS — R39.11 URINARY HESITANCY: ICD-10-CM

## 2021-11-30 RX ORDER — TAMSULOSIN HYDROCHLORIDE 0.4 MG/1
0.4 CAPSULE ORAL DAILY
Qty: 30 CAPSULE | Refills: 11 | Status: SHIPPED | OUTPATIENT
Start: 2021-11-30 | End: 2022-11-01 | Stop reason: SDUPTHER

## 2022-01-03 DIAGNOSIS — J44.9 CHRONIC OBSTRUCTIVE PULMONARY DISEASE, UNSPECIFIED COPD TYPE: ICD-10-CM

## 2022-01-04 RX ORDER — ALBUTEROL SULFATE 90 UG/1
2 AEROSOL, METERED RESPIRATORY (INHALATION) EVERY 6 HOURS PRN
Qty: 18 G | Refills: 0 | OUTPATIENT
Start: 2022-01-04 | End: 2023-01-04

## 2022-01-18 ENCOUNTER — OFFICE VISIT (OUTPATIENT)
Dept: INTERNAL MEDICINE | Facility: CLINIC | Age: 67
End: 2022-01-18
Payer: MEDICAID

## 2022-01-18 VITALS
WEIGHT: 142.88 LBS | HEART RATE: 117 BPM | BODY MASS INDEX: 18.94 KG/M2 | HEIGHT: 73 IN | DIASTOLIC BLOOD PRESSURE: 88 MMHG | SYSTOLIC BLOOD PRESSURE: 138 MMHG | RESPIRATION RATE: 18 BRPM | OXYGEN SATURATION: 97 %

## 2022-01-18 DIAGNOSIS — R97.20 ELEVATED PSA: ICD-10-CM

## 2022-01-18 DIAGNOSIS — E78.5 HYPERLIPIDEMIA, UNSPECIFIED HYPERLIPIDEMIA TYPE: ICD-10-CM

## 2022-01-18 DIAGNOSIS — Z12.11 SCREENING FOR COLON CANCER: ICD-10-CM

## 2022-01-18 DIAGNOSIS — Z72.0 TOBACCO USE: ICD-10-CM

## 2022-01-18 DIAGNOSIS — R06.02 SHORTNESS OF BREATH: ICD-10-CM

## 2022-01-18 DIAGNOSIS — K40.90 NON-RECURRENT UNILATERAL INGUINAL HERNIA WITHOUT OBSTRUCTION OR GANGRENE: Primary | ICD-10-CM

## 2022-01-18 DIAGNOSIS — J44.9 CHRONIC OBSTRUCTIVE PULMONARY DISEASE, UNSPECIFIED COPD TYPE: ICD-10-CM

## 2022-01-18 DIAGNOSIS — Z12.11 ENCOUNTER FOR SCREENING COLONOSCOPY: ICD-10-CM

## 2022-01-18 DIAGNOSIS — K40.30 NON-RECURRENT UNILATERAL INGUINAL HERNIA WITH OBSTRUCTION WITHOUT GANGRENE: ICD-10-CM

## 2022-01-18 DIAGNOSIS — F17.210 SMOKING GREATER THAN 40 PACK YEARS: ICD-10-CM

## 2022-01-18 PROCEDURE — 99213 OFFICE O/P EST LOW 20 MIN: CPT | Mod: S$PBB,,,

## 2022-01-18 PROCEDURE — 99999 PR PBB SHADOW E&M-EST. PATIENT-LVL IV: CPT | Mod: PBBFAC,,,

## 2022-01-18 PROCEDURE — 99213 PR OFFICE/OUTPT VISIT, EST, LEVL III, 20-29 MIN: ICD-10-PCS | Mod: S$PBB,,,

## 2022-01-18 PROCEDURE — 99214 OFFICE O/P EST MOD 30 MIN: CPT | Mod: PBBFAC

## 2022-01-18 PROCEDURE — 99999 PR PBB SHADOW E&M-EST. PATIENT-LVL IV: ICD-10-PCS | Mod: PBBFAC,,,

## 2022-01-18 RX ORDER — ATORVASTATIN CALCIUM 40 MG/1
40 TABLET, FILM COATED ORAL DAILY
Qty: 90 TABLET | Refills: 3 | Status: SHIPPED | OUTPATIENT
Start: 2022-01-18 | End: 2022-12-20 | Stop reason: SDUPTHER

## 2022-01-18 RX ORDER — VARENICLINE TARTRATE 0.5 (11)-1
KIT ORAL
Qty: 1 TABLET | Refills: 0 | Status: CANCELLED | OUTPATIENT
Start: 2022-01-18

## 2022-01-18 NOTE — ASSESSMENT & PLAN NOTE
Patient reports a colonoscopy in 2009 that was normal, has not gotten a colonoscopy since.     - Case request for colonoscopy, provided patient with number as well

## 2022-01-18 NOTE — ASSESSMENT & PLAN NOTE
Patient reports SOB with walking a short distance. mMRC >2. No hospitalizations    - Started Trelegy ellipta

## 2022-01-18 NOTE — ASSESSMENT & PLAN NOTE
Patient report continuing to smoke 1/2 pack per day. Has previously tried nicotine replacement gum, but has not worked. He is interested in trying Chantix.     - Referred to smoking cessation program to have chantix covered

## 2022-01-18 NOTE — ASSESSMENT & PLAN NOTE
"Patient reports noticing a "knot" in his inguinal region that is reducible. On exam, no overlying skin changes, tenderness. Soft reducible hernia present on left side. No concern for incarceration or strangulation at this time.      - Ambulatory referral to general surgery for elective hernia repair.   "

## 2022-01-18 NOTE — PATIENT INSTRUCTIONS
1. Please follow up with general surgery for elective hernia repair.   2. Please follow up with urology at [facility] for urology appointment: [email or web address]  3. Enroll in tobacco cessation program to use Chantix to quit smoking at no cost to you. Phone [phone]  4. Schedule colonoscopy: [phone]  5.  and try Trelegy Ellipta for COPD  6.  and take lipitor for your cholesterol.               Patient Education       Groin Hernia Discharge Instructions   About this topic   The belly wall covers the center of your body. It keeps your stomach and other body organs in place. Sometimes, your belly wall becomes weak. If this happens in the area near the top of your leg, you may get a groin hernia. The area between your belly and your upper leg is your groin. Part of an organ may bulge or swell through the weak spot in the groin and get stuck. A hernia repair surgery fixes this weakness in the groin. Then, your organs stay in place. You may have this on one side of your body or on both sides.     What care is needed at home?   · Ask your doctor what you need to do when you go home. Make sure you ask questions if you do not understand what the doctor says. This way you will know what you need to do.  · Make sure to treat allergies, long-term hard stools, or cough. This may help lower the chance of hernia recurrence.  · Do not wear tight clothing over your hernia. Ask your doctor about wearing a binder to help keep your hernia in place.  What follow-up care is needed?   Your doctor may ask you to make visits to the office to check on your progress. Be sure to keep these visits.  What drugs may be needed?   The doctor may order drugs to:  · Help with pain  · Fight an infection  · Soften stool  Will physical activity be limited?   You may need to rest for a while. You should not do physical activity that makes your health problem worse. If you run, work out, or play sports, you may not be able to  do those things until your health problem gets better. Avoid activities that cause you to strain, like heavy lifting. Talk to your doctor about the right amount of activity for you.  What changes to diet are needed?   · Eat foods high in fiber. These include grains, bran, cereals, and fresh fruits and vegetables. Your doctor can talk with you about changes in your diet.  · Drink 8 to 10 glasses of water each day.  What problems could happen?   · Infection  · Scarring  · Injury to nearby organs  · Bowel blockage or obstruction  What can be done to prevent this health problem?   · Keep a healthy weight. Lose weight if you are overweight.  · Avoid smoking. Ask for help if you are having problems quitting.  · Avoid straining when having a bowel movement.  When do I need to call the doctor?   · Signs of infection. These include a fever of 100.4°F (38°C) or higher, chills.  · Groin pain gets worse all of a sudden  · Severe abdominal pain  · Throwing up or not able to pass stool  · Trouble passing urine  Teach Back: Helping You Understand   The Teach Back Method helps you understand the information we are giving you. After you talk with the staff, tell them in your own words what you learned. This helps to make sure the staff has described each thing clearly. It also helps to explain things that may have been confusing. Before going home, make sure you can do these:  · I can tell you about my condition.  · I can tell you what activities are best for me.  · I can tell you what I will do if I have a fever, chills, or pain.  Where can I learn more?   [employer]  https://www.facs.org/~/media/files/education/patient%20ed/groin_hernia.ashx   [facility] Choices  https://www.nhs.uk/conditions/inguinal-hernia-repair/what-happens/   Last Reviewed Date   2021-03-22  Consumer Information Use and Disclaimer   This information is not specific medical advice and does not replace information you receive from your health care  provider. This is only a brief summary of general information. It does NOT include all information about conditions, illnesses, injuries, tests, procedures, treatments, therapies, discharge instructions or life-style choices that may apply to you. You must talk with your health care provider for complete information about your health and treatment options. This information should not be used to decide whether or not to accept your health care providers advice, instructions or recommendations. Only your health care provider has the knowledge and training to provide advice that is right for you.  Copyright   Copyright © 2021 [employer], [employer] and its affiliates and/or licensors. All rights reserved.

## 2022-01-18 NOTE — PROGRESS NOTES
"Clinic Note  1/18/2022      Subjective:       Patient ID:  Avni is a 66 y.o. male being seen for an established care visit.    Chief Complaint: No chief complaint on file.    Mr. Reyes is a 62M with tobacco abuse (>40 pack years), COPD, HTN, HLD, elevated PSA who presents to for follow-up.     Urinary issues -   - takes a long time to initiate urine stream  - no pain with urination  - urge to go 3x/day, at night as well  - start/stop of stream  - stinging with urination  - no hematuria  - no weight loss  - Flomax helps a little  - PSA elevated x2    "Groin Knot"  - "groin knot" - sometimes present/sometimes not, reducible   - no hx of hernias  - no hx of lifting heavy things   - Reports constipation    COPD  - congestion, sputum production  - cough  - harder to breath especially at night  - Interested in trelegy  - no hospitalizations for COPD  - Uses inhalers 2-3 times a day for SOB  - wheezing, congestion  - a few days of rhinorrhea  - can walk to door before feeling short of breath    Tobacco use: 1/2 pack/day   Cessation program? Interested in chantix, cost covered if patient enrolled in tobacco cessation program  Colonoscopy? Not yet, referral made in Oct 2021    From previous visit, screening LD CT revealed bilateral solid pulmonary nodules measuring up to 0.5 cm, new when compared to the previous CTA of 01/15/2018 and emphysema Lung-RADS Category:  2 - Benign Appearance or Behavior - continue annual screening with LDCT in 12 months.     Care gaps addressed:   -one time AAA US screening for tobacco use negative  -Annual Low dose CT for Nov 2022 Lung RADS category 2 - follow up in one year  -Colonoscopy case request sent  -Covid vaccine: received  2 doses (unknown which vaccine) in [place]  -Shingles vaccine: not taken  -Covid booster due            Review of Systems   Constitutional: Positive for diaphoresis. Negative for chills, fever and weight loss (over 2 years).   HENT: Positive for congestion. " Negative for sore throat.    Respiratory: Positive for cough, shortness of breath and wheezing.    Cardiovascular: Negative for chest pain and leg swelling.   Gastrointestinal: Positive for constipation. Negative for abdominal pain, diarrhea, heartburn, nausea and vomiting.   Genitourinary: Negative for dysuria, hematuria and urgency.        Difficulty with voiding   Musculoskeletal: Negative for joint pain.   Skin: Negative for rash.       Medication List with Changes/Refills   New Medications    ATORVASTATIN (LIPITOR) 40 MG TABLET    Take 1 tablet (40 mg total) by mouth once daily.    FLUTICASONE-UMECLIDIN-VILANTER (TRELEGY ELLIPTA) 100-62.5-25 MCG DSDV    Inhale 1 puff into the lungs once daily.   Current Medications    ALBUTEROL (PROVENTIL HFA) 90 MCG/ACTUATION INHALER    Inhale 2 puffs into the lungs every 6 (six) hours as needed for Wheezing. Rescue    FLUTICASONE PROPIONATE (FLOVENT HFA) 110 MCG/ACTUATION INHALER    Inhale 1 puff into the lungs 2 (two) times daily. Controller    IPRATROPIUM-ALBUTEROL (COMBIVENT RESPIMAT)  MCG/ACTUATION INHALER    Inhale 1 puff by mouth four times daily    IPRATROPIUM-ALBUTEROL (COMBIVENT)  MCG/ACTUATION INHALER    Inhale 1 puff into the lungs every 6 (six) hours as needed for Wheezing or Shortness of Breath. Rescue    OMEPRAZOLE (PRILOSEC) 20 MG CAPSULE    Take 1 capsule (20 mg total) by mouth once daily.    TAMSULOSIN (FLOMAX) 0.4 MG CAP    Take 1 capsule (0.4 mg total) by mouth once daily.    TAMSULOSIN (FLOMAX) 0.4 MG CAP    Take 1 capsule (0.4 mg total) by mouth once daily.    TIOTROPIUM (SPIRIVA WITH HANDIHALER) 18 MCG INHALATION CAPSULE    Inhale contents 1 capsule (18 mcg total) via handihaler into the lungs once daily. Controller do not swallow       Patient Active Problem List   Diagnosis    HTN (hypertension)    Hyperlipidemia    Smoking greater than 40 pack years    Encounter for abdominal aortic aneurysm (AAA) screening    HERRERA (dyspnea on  "exertion)    COPD (chronic obstructive pulmonary disease)    Low vitamin D level    Benign prostatic hyperplasia without urinary obstruction    Finding of above normal blood pressure    Gastroesophageal reflux disease with esophagitis    Tobacco user    Chronic obstructive pulmonary disease    Elevated PSA    Non-recurrent unilateral inguinal hernia with obstruction without gangrene    Encounter for screening colonoscopy           Objective:      /88 (BP Location: Left arm, Patient Position: Sitting, BP Method: Large (Manual))   Pulse (!) 117   Resp 18   Ht 6' 1" (1.854 m)   Wt 64.8 kg (142 lb 13.7 oz)   SpO2 97%   BMI 18.85 kg/m²   Estimated body mass index is 18.85 kg/m² as calculated from the following:    Height as of this encounter: 6' 1" (1.854 m).    Weight as of this encounter: 64.8 kg (142 lb 13.7 oz).  Physical Exam  Vitals and nursing note reviewed.   Constitutional:       Appearance: Normal appearance.   HENT:      Head: Normocephalic and atraumatic.   Eyes:      General:         Right eye: No discharge.         Left eye: No discharge.   Cardiovascular:      Rate and Rhythm: Regular rhythm. Tachycardia present.      Pulses: Normal pulses.      Heart sounds: No murmur heard.      Pulmonary:      Effort: Pulmonary effort is normal. No respiratory distress.      Breath sounds: Normal breath sounds. No wheezing or rales.   Abdominal:      General: Abdomen is flat. Bowel sounds are normal. There is no distension.      Tenderness: There is no abdominal tenderness. There is no guarding.   Genitourinary:     Penis: Normal.       Testes: Normal.      Comments: Small reducible inguinal hernia present on left side, no overlying tenderness or erythema  Musculoskeletal:         General: Normal range of motion.      Cervical back: Normal range of motion. No rigidity.      Right lower leg: No edema.      Left lower leg: No edema.   Skin:     General: Skin is warm and dry.      Capillary Refill: " "Capillary refill takes less than 2 seconds.      Coloration: Skin is not jaundiced.   Neurological:      General: No focal deficit present.      Mental Status: He is alert.   Psychiatric:         Mood and Affect: Mood normal.           Assessment and Plan:         Problem List Items Addressed This Visit        Pulmonary    Chronic obstructive pulmonary disease    Relevant Medications    fluticasone-umeclidin-vilanter (TRELEGY ELLIPTA) 100-62.5-25 mcg DsDv       Cardiac/Vascular    Hyperlipidemia    Current Assessment & Plan     ASCVD 15%, started on Lipitor 40 mg         Relevant Medications    atorvastatin (LIPITOR) 40 MG tablet       Renal/    Elevated PSA    Current Assessment & Plan     PSA elevated in 2019, high in 2021. Patient reports worsening obstructive symptoms despite using Flomax. Weight loss, concern for prostate malignancy    - Referral to urology external [facility], unable to be seen by [facility] due to insurance beign Medicaid  - Will fax referral to [facility]         Relevant Orders    Ambulatory referral/consult to Urology       GI    Non-recurrent unilateral inguinal hernia with obstruction without gangrene    Current Assessment & Plan     Patient reports noticing a "knot" in his inguinal region that is reducible. On exam, no overlying skin changes, tenderness. Soft reducible hernia present on left side. No concern for incarceration or strangulation at this time.      - Ambulatory referral to general surgery for elective hernia repair.          Encounter for screening colonoscopy    Current Assessment & Plan     Patient reports a colonoscopy in 2009 that was normal, has not gotten a colonoscopy since.     - Case request for colonoscopy, provided patient with number as well            Other    Smoking greater than 40 pack years    Current Assessment & Plan     Patient report continuing to smoke 1/2 pack per day. Has previously tried nicotine replacement gum, but has not worked. He is interested in " trying Chantix.     - Referred to smoking cessation program to have chantix covered           Other Visit Diagnoses     Non-recurrent unilateral inguinal hernia without obstruction or gangrene    -  Primary    Relevant Orders    Ambulatory referral/consult to General Surgery    Screening for colon cancer        Relevant Orders    Case Request Endoscopy: COLONOSCOPY (Completed)    Tobacco use        Relevant Orders    Ambulatory referral/consult to Smoking Cessation Program    Shortness of breath        Relevant Orders    COVID-19 Routine Screening          Follow Up:   Follow up in about 3 months (around 4/18/2022).        Ishaan Mcdaniel      Signed:   Ishaan Mcdaniel MD - PGY1  Internal Medicine  [facility]    Preceptor note:    Patient's history and physical discussed, please refer to resident physician's note for specific details. Close monitoring with new medicine. rec for pulm function testing  I agree with resident's assessment and plan.      Angel Luis Castillo  Staff Physician  [facility]

## 2022-01-18 NOTE — ASSESSMENT & PLAN NOTE
PSA elevated in 2019, high in 2021. Patient reports worsening obstructive symptoms despite using Flomax. Weight loss, concern for prostate malignancy    - Referral to urology external [facility], unable to be seen by [facility] due to insurance beign Medicaid  - Will fax referral to [facility]

## 2022-01-24 ENCOUNTER — OFFICE VISIT (OUTPATIENT)
Dept: SURGERY | Facility: CLINIC | Age: 67
End: 2022-01-24
Payer: MEDICAID

## 2022-01-24 VITALS
HEART RATE: 122 BPM | DIASTOLIC BLOOD PRESSURE: 75 MMHG | HEIGHT: 73 IN | WEIGHT: 147 LBS | TEMPERATURE: 98 F | BODY MASS INDEX: 19.48 KG/M2 | SYSTOLIC BLOOD PRESSURE: 153 MMHG

## 2022-01-24 DIAGNOSIS — K40.90 NON-RECURRENT UNILATERAL INGUINAL HERNIA WITHOUT OBSTRUCTION OR GANGRENE: ICD-10-CM

## 2022-01-24 PROCEDURE — 1159F MED LIST DOCD IN RCRD: CPT | Mod: CPTII,,, | Performed by: SURGERY

## 2022-01-24 PROCEDURE — 3008F BODY MASS INDEX DOCD: CPT | Mod: CPTII,,, | Performed by: SURGERY

## 2022-01-24 PROCEDURE — 99203 PR OFFICE/OUTPT VISIT, NEW, LEVL III, 30-44 MIN: ICD-10-PCS | Mod: S$PBB,,, | Performed by: SURGERY

## 2022-01-24 PROCEDURE — 99999 PR PBB SHADOW E&M-EST. PATIENT-LVL III: CPT | Mod: PBBFAC,,, | Performed by: SURGERY

## 2022-01-24 PROCEDURE — 3008F PR BODY MASS INDEX (BMI) DOCUMENTED: ICD-10-PCS | Mod: CPTII,,, | Performed by: SURGERY

## 2022-01-24 PROCEDURE — 3077F SYST BP >= 140 MM HG: CPT | Mod: CPTII,,, | Performed by: SURGERY

## 2022-01-24 PROCEDURE — 3077F PR MOST RECENT SYSTOLIC BLOOD PRESSURE >= 140 MM HG: ICD-10-PCS | Mod: CPTII,,, | Performed by: SURGERY

## 2022-01-24 PROCEDURE — 3078F DIAST BP <80 MM HG: CPT | Mod: CPTII,,, | Performed by: SURGERY

## 2022-01-24 PROCEDURE — 1101F PR PT FALLS ASSESS DOC 0-1 FALLS W/OUT INJ PAST YR: ICD-10-PCS | Mod: CPTII,,, | Performed by: SURGERY

## 2022-01-24 PROCEDURE — 3078F PR MOST RECENT DIASTOLIC BLOOD PRESSURE < 80 MM HG: ICD-10-PCS | Mod: CPTII,,, | Performed by: SURGERY

## 2022-01-24 PROCEDURE — 3288F PR FALLS RISK ASSESSMENT DOCUMENTED: ICD-10-PCS | Mod: CPTII,,, | Performed by: SURGERY

## 2022-01-24 PROCEDURE — 1126F PR PAIN SEVERITY QUANTIFIED, NO PAIN PRESENT: ICD-10-PCS | Mod: CPTII,,, | Performed by: SURGERY

## 2022-01-24 PROCEDURE — 1159F PR MEDICATION LIST DOCUMENTED IN MEDICAL RECORD: ICD-10-PCS | Mod: CPTII,,, | Performed by: SURGERY

## 2022-01-24 PROCEDURE — 99213 OFFICE O/P EST LOW 20 MIN: CPT | Mod: PBBFAC | Performed by: SURGERY

## 2022-01-24 PROCEDURE — 99999 PR PBB SHADOW E&M-EST. PATIENT-LVL III: ICD-10-PCS | Mod: PBBFAC,,, | Performed by: SURGERY

## 2022-01-24 PROCEDURE — 3288F FALL RISK ASSESSMENT DOCD: CPT | Mod: CPTII,,, | Performed by: SURGERY

## 2022-01-24 PROCEDURE — 1101F PT FALLS ASSESS-DOCD LE1/YR: CPT | Mod: CPTII,,, | Performed by: SURGERY

## 2022-01-24 PROCEDURE — 99203 OFFICE O/P NEW LOW 30 MIN: CPT | Mod: S$PBB,,, | Performed by: SURGERY

## 2022-01-24 PROCEDURE — 1126F AMNT PAIN NOTED NONE PRSNT: CPT | Mod: CPTII,,, | Performed by: SURGERY

## 2022-01-24 NOTE — PROGRESS NOTES
GENERAL SURGERY  Clinic Note        SUBJECTIVE:     HISTORY OF PRESENT ILLNESS:  Avni Reyes is a 66 y.o. male with GERD, BPH, tobacco use and COPD who has been referred to surgery clinic for left inguinal hernia evaluation. He reports left groin bulge which he first noticed while straining to use the rest room several weeks ago. Since then it has intermittently protruded out - it always goes back in when he pushes it or when he lies down. He denies pain at the area. It does not cause him discomfort, but does bother him when he is doing strenuous activities. He denies nausea, vomiting, fatigue, skin changes. He endorses constipation. His functional capacity is quite low - he wheeled in today via wheelchair. He reports sometimes it is hard for him to walk very short distances as he becomes short of breath quickly. He currently smokes 1-2 packs per week and has smoked for many years. When asked about quitting - he reports he would like to and may try.     No history of prior hernia repair.     50+ year pack history       MEDICATIONS:  Home Medications:  Current Outpatient Medications on File Prior to Visit   Medication Sig Dispense Refill    atorvastatin (LIPITOR) 40 MG tablet Take 1 tablet (40 mg total) by mouth once daily. 90 tablet 3    fluticasone-umeclidin-vilanter (TRELEGY ELLIPTA) 100-62.5-25 mcg DsDv Inhale 1 puff into the lungs once daily. 60 each 6    ipratropium-albuteroL (COMBIVENT RESPIMAT)  mcg/actuation inhaler Inhale 1 puff by mouth four times daily 4 g 11    ipratropium-albuterol (COMBIVENT)  mcg/actuation inhaler Inhale 1 puff into the lungs every 6 (six) hours as needed for Wheezing or Shortness of Breath. Rescue 2 Package 11    omeprazole (PRILOSEC) 20 MG capsule Take 1 capsule (20 mg total) by mouth once daily. 30 capsule 12    tamsulosin (FLOMAX) 0.4 mg Cap Take 1 capsule (0.4 mg total) by mouth once daily. 30 capsule 2    tamsulosin (FLOMAX) 0.4 mg Cap Take 1 capsule (0.4 mg  total) by mouth once daily. 30 capsule 11    tiotropium (SPIRIVA WITH HANDIHALER) 18 mcg inhalation capsule Inhale contents 1 capsule (18 mcg total) via handihaler into the lungs once daily. Controller do not swallow 360 capsule 0    albuterol (PROVENTIL HFA) 90 mcg/actuation inhaler Inhale 2 puffs into the lungs every 6 (six) hours as needed for Wheezing. Rescue 18 g 0    fluticasone propionate (FLOVENT HFA) 110 mcg/actuation inhaler Inhale 1 puff into the lungs 2 (two) times daily. Controller (Patient not taking: Reported on 1/24/2022) 12 g 11     No current facility-administered medications on file prior to visit.       ALLERGIES:    Review of patient's allergies indicates:  No Known Allergies    PAST MEDICAL HISTORY:    Past Medical History:   Diagnosis Date    COPD (chronic obstructive pulmonary disease)     Hyperlipidemia     Hypertension        SURGICAL HISTORY:  Past Surgical History:   Procedure Laterality Date    COLONOSCOPY      2009. Unknown results but pathology samples taken per patient.     LEG SURGERY      seconndary to [facility]- Right leg       FAMILY HISTORY:  Family History   Problem Relation Age of Onset    Hyperlipidemia Mother     Heart disease Mother     Heart disease Father     Hyperlipidemia Father     Diabetes Father        SOCIAL HISTORY:  Social History     Tobacco Use    Smoking status: Current Some Day Smoker     Packs/day: 1.50     Years: 39.00     Pack years: 58.50     Types: Cigarettes     Start date: 12/16/1967    Smokeless tobacco: Never Used   Substance Use Topics    Alcohol use: Yes     Comment: social    Drug use: No        REVIEW OF SYSTEMS:  Review of Systems   Respiratory: Positive for cough, chest tightness, shortness of breath and wheezing.    Cardiovascular: Negative for chest pain and leg swelling.   Gastrointestinal: Negative for abdominal distention and abdominal pain.   Genitourinary:        Groin bulge         OBJECTIVE:     Most Recent Vitals:  Temp: 98.4  °F (36.9 °C) (01/24/22 1348)  Pulse: (!) 122 (01/24/22 1348)  BP: (!) 153/75 (01/24/22 1348)      PHYSICAL EXAM:  Physical Exam  Vitals and nursing note reviewed.   Constitutional:       Appearance: Normal appearance.      Comments: arrived in wheelchair   HENT:      Head: Normocephalic and atraumatic.   Cardiovascular:      Rate and Rhythm: Normal rate and regular rhythm.   Pulmonary:      Effort: Pulmonary effort is normal. No respiratory distress.      Breath sounds: Wheezing present.   Abdominal:      General: Abdomen is flat.      Palpations: Abdomen is soft.   Genitourinary:     Penis: Normal.       Testes: Normal.   Skin:     General: Skin is warm and dry.      Comments: Left inguinal hernia with approximately 6 x 3 cm mass effect  Fatty filled, easily reducible without pain   Neurological:      General: No focal deficit present.      Mental Status: He is alert and oriented to person, place, and time.   Psychiatric:         Mood and Affect: Mood normal.         Behavior: Behavior normal.           LABORATORY VALUES:  N/A    ASSESSMENT:     Avni Reyes is a 66 y.o. male referred for left inguinal hernia. At this time his hernia is relatively asymptomatic and his pulmonary status along with continued smoking puts him at very high risk for an elective hernia repair. This was discussed with him at length - he agrees that his hernia does not bother him enough to warrant risk of general anesthesia.      PLAN:  · Hernia precautions given - when to call back/present to ER discussed  · Smoking cessation discussed  · Will call us back should he stop smoking and/or his symptoms worsen    Ijeoma Soto MD  PGY-2, General Surgery  [facility]

## 2022-02-07 PROBLEM — Z13.6 ENCOUNTER FOR ABDOMINAL AORTIC ANEURYSM (AAA) SCREENING: Status: RESOLVED | Noted: 2019-03-12 | Resolved: 2022-02-07

## 2022-02-21 ENCOUNTER — TELEPHONE (OUTPATIENT)
Dept: SMOKING CESSATION | Facility: CLINIC | Age: 67
End: 2022-02-21
Payer: MEDICARE

## 2022-02-21 NOTE — TELEPHONE ENCOUNTER
Called patient in regard to Smoking Cessation  no show scheduled intake appointment this afternoon. Left message for patient to call back and reschedule.  Fatou Eddy, [facility]  [phone]

## 2022-04-22 ENCOUNTER — TELEPHONE (OUTPATIENT)
Dept: INTERNAL MEDICINE | Facility: CLINIC | Age: 67
End: 2022-04-22
Payer: MEDICARE

## 2022-04-22 ENCOUNTER — TELEPHONE (OUTPATIENT)
Dept: ENDOSCOPY | Facility: HOSPITAL | Age: 67
End: 2022-04-22
Payer: MEDICARE

## 2022-04-22 NOTE — TELEPHONE ENCOUNTER
Called [phone] [facility] to check on status of patient's external urology referral. Patient missed appointment with me, so wanted to verified that urology referral went through. Referral received March 7, still under review. [facility] will reach out to patient to schedule appointment.     Attempted to call patient 2x at home number and cell phone. Left message on cell phone emphasizing need to follow up with urology, schedule colonoscopy, get shingles vaccine, and follow up with smoking cessation program.

## 2022-05-26 ENCOUNTER — OFFICE VISIT (OUTPATIENT)
Dept: INTERNAL MEDICINE | Facility: CLINIC | Age: 67
End: 2022-05-26
Payer: MEDICARE

## 2022-05-26 ENCOUNTER — HOSPITAL ENCOUNTER (INPATIENT)
Facility: HOSPITAL | Age: 67
LOS: 1 days | Discharge: HOME | DRG: 190 | End: 2022-05-30
Attending: EMERGENCY MEDICINE | Admitting: INTERNAL MEDICINE
Payer: MEDICARE

## 2022-05-26 VITALS
SYSTOLIC BLOOD PRESSURE: 150 MMHG | DIASTOLIC BLOOD PRESSURE: 90 MMHG | RESPIRATION RATE: 18 BRPM | HEART RATE: 103 BPM | TEMPERATURE: 98 F | OXYGEN SATURATION: 100 % | BODY MASS INDEX: 19.4 KG/M2 | WEIGHT: 146.38 LBS | HEIGHT: 73 IN

## 2022-05-26 DIAGNOSIS — R97.20 ELEVATED PSA: ICD-10-CM

## 2022-05-26 DIAGNOSIS — R06.02 SHORTNESS OF BREATH: ICD-10-CM

## 2022-05-26 DIAGNOSIS — J96.02 ACUTE RESPIRATORY FAILURE WITH HYPOXIA AND HYPERCARBIA: ICD-10-CM

## 2022-05-26 DIAGNOSIS — E78.5 HYPERLIPIDEMIA, UNSPECIFIED HYPERLIPIDEMIA TYPE: ICD-10-CM

## 2022-05-26 DIAGNOSIS — J43.9 PULMONARY EMPHYSEMA, UNSPECIFIED EMPHYSEMA TYPE: ICD-10-CM

## 2022-05-26 DIAGNOSIS — R07.9 CHEST PAIN: ICD-10-CM

## 2022-05-26 DIAGNOSIS — Z12.11 ENCOUNTER FOR SCREENING COLONOSCOPY: ICD-10-CM

## 2022-05-26 DIAGNOSIS — F17.210 SMOKING GREATER THAN 40 PACK YEARS: ICD-10-CM

## 2022-05-26 DIAGNOSIS — J96.01 ACUTE RESPIRATORY FAILURE WITH HYPOXIA AND HYPERCARBIA: ICD-10-CM

## 2022-05-26 DIAGNOSIS — Z12.11 SCREENING FOR COLON CANCER: ICD-10-CM

## 2022-05-26 DIAGNOSIS — Z72.0 TOBACCO USER: ICD-10-CM

## 2022-05-26 DIAGNOSIS — R09.02 HYPOXIA: ICD-10-CM

## 2022-05-26 DIAGNOSIS — J44.9 CHRONIC OBSTRUCTIVE PULMONARY DISEASE, UNSPECIFIED COPD TYPE: Primary | ICD-10-CM

## 2022-05-26 DIAGNOSIS — J44.1 COPD EXACERBATION: Primary | ICD-10-CM

## 2022-05-26 DIAGNOSIS — J44.9 CHRONIC OBSTRUCTIVE PULMONARY DISEASE, UNSPECIFIED COPD TYPE: ICD-10-CM

## 2022-05-26 LAB
ALBUMIN SERPL BCP-MCNC: 3.5 G/DL (ref 3.5–5.2)
ALLENS TEST: ABNORMAL
ALP SERPL-CCNC: 47 U/L (ref 55–135)
ALT SERPL W/O P-5'-P-CCNC: 13 U/L (ref 10–44)
ANION GAP SERPL CALC-SCNC: 7 MMOL/L (ref 8–16)
AST SERPL-CCNC: 16 U/L (ref 10–40)
BASOPHILS # BLD AUTO: 0.03 K/UL (ref 0–0.2)
BASOPHILS NFR BLD: 0.9 % (ref 0–1.9)
BILIRUB SERPL-MCNC: 0.9 MG/DL (ref 0.1–1)
BNP SERPL-MCNC: 57 PG/ML (ref 0–99)
BUN SERPL-MCNC: 8 MG/DL (ref 8–23)
CALCIUM SERPL-MCNC: 9.6 MG/DL (ref 8.7–10.5)
CHLORIDE SERPL-SCNC: 106 MMOL/L (ref 95–110)
CO2 SERPL-SCNC: 31 MMOL/L (ref 23–29)
CREAT SERPL-MCNC: 0.7 MG/DL (ref 0.5–1.4)
DELSYS: ABNORMAL
DIFFERENTIAL METHOD: ABNORMAL
EOSINOPHIL # BLD AUTO: 0 K/UL (ref 0–0.5)
EOSINOPHIL NFR BLD: 0.6 % (ref 0–8)
ERYTHROCYTE [DISTWIDTH] IN BLOOD BY AUTOMATED COUNT: 14.8 % (ref 11.5–14.5)
EST. GFR  (AFRICAN AMERICAN): >60 ML/MIN/1.73 M^2
EST. GFR  (NON AFRICAN AMERICAN): >60 ML/MIN/1.73 M^2
GLUCOSE SERPL-MCNC: 81 MG/DL (ref 70–110)
HCO3 UR-SCNC: 31.9 MMOL/L (ref 24–28)
HCT VFR BLD AUTO: 36.9 % (ref 40–54)
HGB BLD-MCNC: 11.1 G/DL (ref 14–18)
IMM GRANULOCYTES # BLD AUTO: 0.03 K/UL (ref 0–0.04)
IMM GRANULOCYTES NFR BLD AUTO: 0.9 % (ref 0–0.5)
LYMPHOCYTES # BLD AUTO: 1.1 K/UL (ref 1–4.8)
LYMPHOCYTES NFR BLD: 34.8 % (ref 18–48)
MCH RBC QN AUTO: 28.8 PG (ref 27–31)
MCHC RBC AUTO-ENTMCNC: 30.1 G/DL (ref 32–36)
MCV RBC AUTO: 96 FL (ref 82–98)
MONOCYTES # BLD AUTO: 0.3 K/UL (ref 0.3–1)
MONOCYTES NFR BLD: 9.1 % (ref 4–15)
NEUTROPHILS # BLD AUTO: 1.8 K/UL (ref 1.8–7.7)
NEUTROPHILS NFR BLD: 53.7 % (ref 38–73)
NRBC BLD-RTO: 0 /100 WBC
PCO2 BLDA: 55.9 MMHG (ref 35–45)
PH SMN: 7.37 [PH] (ref 7.35–7.45)
PLATELET # BLD AUTO: 273 K/UL (ref 150–450)
PMV BLD AUTO: 9.4 FL (ref 9.2–12.9)
PO2 BLDA: 104 MMHG (ref 80–100)
POC BE: 7 MMOL/L
POC SATURATED O2: 98 % (ref 95–100)
POC TCO2: 34 MMOL/L (ref 23–27)
POTASSIUM SERPL-SCNC: 4.3 MMOL/L (ref 3.5–5.1)
PROT SERPL-MCNC: 6.1 G/DL (ref 6–8.4)
RBC # BLD AUTO: 3.85 M/UL (ref 4.6–6.2)
SAMPLE: ABNORMAL
SITE: ABNORMAL
SODIUM SERPL-SCNC: 144 MMOL/L (ref 136–145)
WBC # BLD AUTO: 3.28 K/UL (ref 3.9–12.7)

## 2022-05-26 PROCEDURE — 93005 ELECTROCARDIOGRAM TRACING: CPT

## 2022-05-26 PROCEDURE — 99406 PR TOBACCO USE CESSATION INTERMEDIATE 3-10 MINUTES: ICD-10-PCS | Mod: ,,, | Performed by: PHYSICIAN ASSISTANT

## 2022-05-26 PROCEDURE — 99285 PR EMERGENCY DEPT VISIT,LEVEL V: ICD-10-PCS | Mod: ,,, | Performed by: EMERGENCY MEDICINE

## 2022-05-26 PROCEDURE — 86803 HEPATITIS C AB TEST: CPT | Performed by: EMERGENCY MEDICINE

## 2022-05-26 PROCEDURE — 99999 PR PBB SHADOW E&M-EST. PATIENT-LVL V: ICD-10-PCS | Mod: PBBFAC,GC,,

## 2022-05-26 PROCEDURE — 99215 OFFICE O/P EST HI 40 MIN: CPT | Mod: PBBFAC,25

## 2022-05-26 PROCEDURE — 83880 ASSAY OF NATRIURETIC PEPTIDE: CPT | Performed by: EMERGENCY MEDICINE

## 2022-05-26 PROCEDURE — 82272 OCCULT BLD FECES 1-3 TESTS: CPT

## 2022-05-26 PROCEDURE — 25000242 PHARM REV CODE 250 ALT 637 W/ HCPCS: Performed by: INTERNAL MEDICINE

## 2022-05-26 PROCEDURE — G0378 HOSPITAL OBSERVATION PER HR: HCPCS

## 2022-05-26 PROCEDURE — 99900035 HC TECH TIME PER 15 MIN (STAT)

## 2022-05-26 PROCEDURE — 94640 AIRWAY INHALATION TREATMENT: CPT

## 2022-05-26 PROCEDURE — 99220 PR INITIAL OBSERVATION CARE,LEVL III: ICD-10-PCS | Mod: 25,,, | Performed by: PHYSICIAN ASSISTANT

## 2022-05-26 PROCEDURE — 93010 ELECTROCARDIOGRAM REPORT: CPT | Mod: ,,, | Performed by: INTERNAL MEDICINE

## 2022-05-26 PROCEDURE — 80053 COMPREHEN METABOLIC PANEL: CPT | Performed by: EMERGENCY MEDICINE

## 2022-05-26 PROCEDURE — 99285 EMERGENCY DEPT VISIT HI MDM: CPT | Mod: ,,, | Performed by: EMERGENCY MEDICINE

## 2022-05-26 PROCEDURE — 99220 PR INITIAL OBSERVATION CARE,LEVL III: CPT | Mod: 25,,, | Performed by: PHYSICIAN ASSISTANT

## 2022-05-26 PROCEDURE — 93010 EKG 12-LEAD: ICD-10-PCS | Mod: ,,, | Performed by: INTERNAL MEDICINE

## 2022-05-26 PROCEDURE — 99999 PR PBB SHADOW E&M-EST. PATIENT-LVL V: CPT | Mod: PBBFAC,GC,,

## 2022-05-26 PROCEDURE — 99406 BEHAV CHNG SMOKING 3-10 MIN: CPT | Mod: ,,, | Performed by: PHYSICIAN ASSISTANT

## 2022-05-26 PROCEDURE — 82803 BLOOD GASES ANY COMBINATION: CPT

## 2022-05-26 PROCEDURE — 36600 WITHDRAWAL OF ARTERIAL BLOOD: CPT

## 2022-05-26 PROCEDURE — 99285 EMERGENCY DEPT VISIT HI MDM: CPT | Mod: 25

## 2022-05-26 PROCEDURE — 99215 OFFICE O/P EST HI 40 MIN: CPT | Mod: GC,S$GLB,,

## 2022-05-26 PROCEDURE — 99215 PR OFFICE/OUTPT VISIT, EST, LEVL V, 40-54 MIN: ICD-10-PCS | Mod: GC,S$GLB,,

## 2022-05-26 PROCEDURE — 85025 COMPLETE CBC W/AUTO DIFF WBC: CPT | Performed by: EMERGENCY MEDICINE

## 2022-05-26 PROCEDURE — 94761 N-INVAS EAR/PLS OXIMETRY MLT: CPT

## 2022-05-26 RX ORDER — SODIUM CHLORIDE 0.9 % (FLUSH) 0.9 %
10 SYRINGE (ML) INJECTION EVERY 8 HOURS
Status: DISCONTINUED | OUTPATIENT
Start: 2022-05-26 | End: 2022-05-30 | Stop reason: HOSPADM

## 2022-05-26 RX ORDER — HYDRALAZINE HYDROCHLORIDE 20 MG/ML
10 INJECTION INTRAMUSCULAR; INTRAVENOUS EVERY 6 HOURS PRN
Status: DISCONTINUED | OUTPATIENT
Start: 2022-05-26 | End: 2022-05-30 | Stop reason: HOSPADM

## 2022-05-26 RX ORDER — IPRATROPIUM BROMIDE AND ALBUTEROL SULFATE 2.5; .5 MG/3ML; MG/3ML
SOLUTION RESPIRATORY (INHALATION)
Status: DISPENSED
Start: 2022-05-26 | End: 2022-05-27

## 2022-05-26 RX ORDER — IBUPROFEN 200 MG
24 TABLET ORAL
Status: DISCONTINUED | OUTPATIENT
Start: 2022-05-26 | End: 2022-05-30 | Stop reason: HOSPADM

## 2022-05-26 RX ORDER — ALBUTEROL SULFATE 2.5 MG/.5ML
10 SOLUTION RESPIRATORY (INHALATION)
Status: COMPLETED | OUTPATIENT
Start: 2022-05-26 | End: 2022-05-26

## 2022-05-26 RX ORDER — IPRATROPIUM BROMIDE AND ALBUTEROL SULFATE 2.5; .5 MG/3ML; MG/3ML
3 SOLUTION RESPIRATORY (INHALATION)
Status: DISCONTINUED | OUTPATIENT
Start: 2022-05-27 | End: 2022-05-30 | Stop reason: HOSPADM

## 2022-05-26 RX ORDER — ONDANSETRON 8 MG/1
8 TABLET, ORALLY DISINTEGRATING ORAL EVERY 8 HOURS PRN
Status: DISCONTINUED | OUTPATIENT
Start: 2022-05-26 | End: 2022-05-30 | Stop reason: HOSPADM

## 2022-05-26 RX ORDER — TALC
6 POWDER (GRAM) TOPICAL NIGHTLY PRN
Status: DISCONTINUED | OUTPATIENT
Start: 2022-05-26 | End: 2022-05-30 | Stop reason: HOSPADM

## 2022-05-26 RX ORDER — IBUPROFEN 200 MG
1 TABLET ORAL DAILY
Status: DISCONTINUED | OUTPATIENT
Start: 2022-05-27 | End: 2022-05-30 | Stop reason: HOSPADM

## 2022-05-26 RX ORDER — PREDNISONE 20 MG/1
40 TABLET ORAL DAILY
Status: DISCONTINUED | OUTPATIENT
Start: 2022-05-27 | End: 2022-05-30 | Stop reason: HOSPADM

## 2022-05-26 RX ORDER — SIMETHICONE 80 MG
1 TABLET,CHEWABLE ORAL 4 TIMES DAILY PRN
Status: DISCONTINUED | OUTPATIENT
Start: 2022-05-26 | End: 2022-05-30 | Stop reason: HOSPADM

## 2022-05-26 RX ORDER — SODIUM CHLORIDE 0.9 % (FLUSH) 0.9 %
3 SYRINGE (ML) INJECTION
Status: DISCONTINUED | OUTPATIENT
Start: 2022-05-26 | End: 2022-05-30 | Stop reason: HOSPADM

## 2022-05-26 RX ORDER — IBUPROFEN 200 MG
16 TABLET ORAL
Status: DISCONTINUED | OUTPATIENT
Start: 2022-05-26 | End: 2022-05-30 | Stop reason: HOSPADM

## 2022-05-26 RX ORDER — ATORVASTATIN CALCIUM 40 MG/1
40 TABLET, FILM COATED ORAL DAILY
Status: DISCONTINUED | OUTPATIENT
Start: 2022-05-27 | End: 2022-05-30 | Stop reason: HOSPADM

## 2022-05-26 RX ORDER — MAG HYDROX/ALUMINUM HYD/SIMETH 200-200-20
30 SUSPENSION, ORAL (FINAL DOSE FORM) ORAL 4 TIMES DAILY PRN
Status: DISCONTINUED | OUTPATIENT
Start: 2022-05-26 | End: 2022-05-30 | Stop reason: HOSPADM

## 2022-05-26 RX ORDER — POLYETHYLENE GLYCOL 3350 17 G/17G
17 POWDER, FOR SOLUTION ORAL DAILY
Status: DISCONTINUED | OUTPATIENT
Start: 2022-05-27 | End: 2022-05-30 | Stop reason: HOSPADM

## 2022-05-26 RX ORDER — TAMSULOSIN HYDROCHLORIDE 0.4 MG/1
0.4 CAPSULE ORAL DAILY
Status: DISCONTINUED | OUTPATIENT
Start: 2022-05-27 | End: 2022-05-30 | Stop reason: HOSPADM

## 2022-05-26 RX ORDER — LEVOFLOXACIN 250 MG/1
750 TABLET ORAL NIGHTLY
Status: DISCONTINUED | OUTPATIENT
Start: 2022-05-26 | End: 2022-05-30 | Stop reason: HOSPADM

## 2022-05-26 RX ORDER — NALOXONE HCL 0.4 MG/ML
0.02 VIAL (ML) INJECTION
Status: DISCONTINUED | OUTPATIENT
Start: 2022-05-26 | End: 2022-05-30 | Stop reason: HOSPADM

## 2022-05-26 RX ORDER — IPRATROPIUM BROMIDE AND ALBUTEROL SULFATE 2.5; .5 MG/3ML; MG/3ML
3 SOLUTION RESPIRATORY (INHALATION)
Status: COMPLETED | OUTPATIENT
Start: 2022-05-26 | End: 2022-05-26

## 2022-05-26 RX ORDER — ONDANSETRON 2 MG/ML
4 INJECTION INTRAMUSCULAR; INTRAVENOUS EVERY 12 HOURS PRN
Status: DISCONTINUED | OUTPATIENT
Start: 2022-05-26 | End: 2022-05-30 | Stop reason: HOSPADM

## 2022-05-26 RX ORDER — ACETAMINOPHEN 325 MG/1
650 TABLET ORAL EVERY 8 HOURS PRN
Status: DISCONTINUED | OUTPATIENT
Start: 2022-05-26 | End: 2022-05-30 | Stop reason: HOSPADM

## 2022-05-26 RX ORDER — PANTOPRAZOLE SODIUM 40 MG/1
40 TABLET, DELAYED RELEASE ORAL DAILY
Refills: 12 | Status: DISCONTINUED | OUTPATIENT
Start: 2022-05-27 | End: 2022-05-30 | Stop reason: HOSPADM

## 2022-05-26 RX ORDER — GLUCAGON 1 MG
1 KIT INJECTION
Status: DISCONTINUED | OUTPATIENT
Start: 2022-05-26 | End: 2022-05-30 | Stop reason: HOSPADM

## 2022-05-26 RX ADMIN — IPRATROPIUM BROMIDE AND ALBUTEROL SULFATE 3 ML: 2.5; .5 SOLUTION RESPIRATORY (INHALATION) at 05:05

## 2022-05-26 RX ADMIN — ALBUTEROL SULFATE 10 MG: 2.5 SOLUTION RESPIRATORY (INHALATION) at 06:05

## 2022-05-26 NOTE — ASSESSMENT & PLAN NOTE
Referral for colonoscopy placed last visit Oct 2021, has not scheduled.     - another case request order placed  - dicussed FIT test vs colonoscopy with patient, opts for colonoscopy   Patient tolerated procedure well.

## 2022-05-26 NOTE — ASSESSMENT & PLAN NOTE
Continues to smoke 1/3 pack/day. Referral to smoking cessation program made. Previously contacted by them, unable to follow up.     - consider prescribing chantix without formal enrollment in smoking cessation program

## 2022-05-26 NOTE — ASSESSMENT & PLAN NOTE
PSA elevated in 2019, high in 2021. Patient reports worsening obstructive symptoms despite using Flomax. Weight loss, poor appetite, concern for prostate malignancy       - Referral to urology external [facility] made already, patient is under evaluation, unlikely to be seen by [facility] due to insurance beign Medicaid  - faxed referral to [facility] last year  - urgent referral to urology

## 2022-05-26 NOTE — ED PROVIDER NOTES
"Encounter date: 4:48 PM 05/26/2022    Source of History   Patient, EMS    Chief Complaint   Pt presents with:   Shortness of Breath (Low O2 saturation on room air in 60"s while walking  at primary care clinic  Pt was then placed on O2 at 4L/min per nasal cannula and pulse Ox O2 sat was 100 % as stated by EMS  EMS decrease O2 to 2 L/min per nasal cannula with pulse ox now at 100 %)      History Of Present Illness   Avni Reyes is a 66 y.o. male with COPD, HTN, HLD who presents to the ED with chief complaint of shortness of breath times x1 week.  Patient states over the last few days he has been short of breath.  He has had increased usage of his inhalers.  He denies any new fever.  He has had a chronic cough that has not been productive.  He went to go see his primary care doctor who noticed that he was satting 60% while walking and sent him to the ED for evaluation.  The patient denies any chest pain.  He denies any lower extremity edema or decreased urine output.  He denies any dark or tarry stools.  No PND, orthopnea.    Denies:  Chest pain, recent falls or trauma.  This is the extent to the patients complaints today here in the emergency department.    Review Of Systems   As per HPI and below:  Positive for:  Shortness of breath  Constitutional: No fever.   HEENT: No voice change. No sore throat .    Eyes:  No visual disturbances. No eye pain.   Respiratory:  + shortness of breath. No wheezing. No cough.   Cardio:  No chest pain. No leg swelling. No palpitations.   GI:  No abdominal pain. No nausea or vomiting. No diarrhea.   :  No blood in urine. No difficulty urinating.   MSK:  No back pain. No neck pain.   Skin: No rash.   Neurologic: No headache. No dizziness.       Review of patient's allergies indicates:  No Known Allergies    No current facility-administered medications on file prior to encounter.     Current Outpatient Medications on File Prior to Encounter   Medication Sig Dispense Refill    " albuterol (PROVENTIL HFA) 90 mcg/actuation inhaler Inhale 2 puffs into the lungs every 6 (six) hours as needed for Wheezing. Rescue 18 g 0    atorvastatin (LIPITOR) 40 MG tablet Take 1 tablet (40 mg total) by mouth once daily. 90 tablet 3    fluticasone-umeclidin-vilanter (TRELEGY ELLIPTA) 100-62.5-25 mcg DsDv Inhale 1 puff into the lungs once daily. 60 each 6    ipratropium-albuteroL (COMBIVENT RESPIMAT)  mcg/actuation inhaler Inhale 1 puff by mouth four times daily 4 g 11    ipratropium-albuterol (COMBIVENT)  mcg/actuation inhaler Inhale 1 puff into the lungs every 6 (six) hours as needed for Wheezing or Shortness of Breath. Rescue 2 Package 11    omeprazole (PRILOSEC) 20 MG capsule Take 1 capsule (20 mg total) by mouth once daily. 30 capsule 12    tamsulosin (FLOMAX) 0.4 mg Cap Take 1 capsule (0.4 mg total) by mouth once daily. 30 capsule 2    tamsulosin (FLOMAX) 0.4 mg Cap Take 1 capsule (0.4 mg total) by mouth once daily. 30 capsule 11    tiotropium (SPIRIVA WITH HANDIHALER) 18 mcg inhalation capsule Inhale contents 1 capsule (18 mcg total) via handihaler into the lungs once daily. Controller do not swallow 360 capsule 0       Past History   As per HPI and below:  Past Medical History:   Diagnosis Date    COPD (chronic obstructive pulmonary disease)     Hyperlipidemia     Hypertension      Past Surgical History:   Procedure Laterality Date    COLONOSCOPY      2009. Unknown results but pathology samples taken per patient.     LEG SURGERY      [facility]ary to [facility]- Right leg       Social History     Socioeconomic History    Marital status: Single   Tobacco Use    Smoking status: Current Some Day Smoker     Packs/day: 1.50     Years: 39.00     Pack years: 58.50     Types: Cigarettes     Start date: 12/16/1967    Smokeless tobacco: Never Used   Substance and Sexual Activity    Alcohol use: Yes     Comment: social    Drug use: No    Sexual activity: Yes     Partners: Female   Social  History Narrative    Living in [place] at least part of the time.     In relationship with female partner Parisa       Family History   Problem Relation Age of Onset    Hyperlipidemia Mother     Heart disease Mother     Heart disease Father     Hyperlipidemia Father     Diabetes Father        Physical Exam     Vitals:    05/26/22 1833 05/26/22 1906 05/26/22 2135 05/26/22 2206   BP:  (!) 144/83  138/73   BP Location:       Patient Position:       Pulse: 99 101 96 95   Resp:  (!) 21  20   Temp:       TempSrc:       SpO2: (!) 92% 96% 99% 95%   Weight:       Height:         Physical Exam:   Nursing note and vitals reviewed.  Appearance:  Nontoxic cachectic elderly male in acute respiratory distress.  Making purposeful movements.  Speaking in 1-2 word sentences.  Skin: No rashes seen.  Good turgor.  No abrasions.  No ecchymoses.  Eyes: No conjunctival injection. EOMI, PERRL.  Head: NC/AT  ENT: Oropharynx clear.    Chest:  Diffusely diminished breath sounds.  Mild increased work of breathing, bilateral chest rise.  Cardiovascular:  Tachycardic get regular rhythm.  Normal equal bilateral radial pulses.  Abdomen: Soft.  Not distended.  Nontender.  No guarding.  No rebound. No Masses  Musculoskeletal: Good range of motion all joints.  No deformities.  Neck supple, full range of motion, no obvious deformity.  Neurologic: Moves all extremities.  Normal sensation.  No facial droop.  Normal speech.    Mental Status:  Alert and oriented x 3.  Appropriate, conversant.      Results and Medications    Procedures    Labs Reviewed   CBC W/ AUTO DIFFERENTIAL - Abnormal; Notable for the following components:       Result Value    WBC 3.28 (*)     RBC 3.85 (*)     Hemoglobin 11.1 (*)     Hematocrit 36.9 (*)     MCHC 30.1 (*)     RDW 14.8 (*)     Immature Granulocytes 0.9 (*)     All other components within normal limits   COMPREHENSIVE METABOLIC PANEL - Abnormal; Notable for the following components:    CO2 31 (*)     Alkaline  Phosphatase 47 (*)     Anion Gap 7 (*)     All other components within normal limits   ISTAT PROCEDURE - Abnormal; Notable for the following components:    POC PCO2 55.9 (*)     POC PO2 104 (*)     POC HCO3 31.9 (*)     POC TCO2 34 (*)     All other components within normal limits   B-TYPE NATRIURETIC PEPTIDE   HEPATITIS C ANTIBODY       Imaging Results          X-Ray Chest AP Portable (Final result)  Result time 05/26/22 19:13:20    Final result by Dany Mulligan MD (05/26/22 19:13:20)                 Impression:      No significant change from 04/10/2019.    No radiographic evidence of CHF.    Electronically signed by resident: Geeta Horn  Date:    05/26/2022  Time:    18:41    Electronically signed by: Dany Mulligan MD  Date:    05/26/2022  Time:    19:13             Narrative:    EXAMINATION:  XR CHEST AP PORTABLE    CLINICAL HISTORY:  CHF;.    TECHNIQUE:  Single frontal portable view of the chest was performed.    COMPARISON:  CT chest 11/08/2021 and chest x-ray 04/10/2019    FINDINGS:  Right cardiac leads project over the chest and upper abdomen.    The lungs are symmetrically expanded.  Stable right lung base linear opacity likely atelectasis.  There are changes of pulmonary emphysema.  No large consolidation, effusion or pneumothorax.    Cardiomediastinal silhouette is stable.  No free air beneath the hemidiaphragms.    Bones are intact.                                    Medications   albuterol-ipratropium (DUO-NEB) 2.5 mg-0.5 mg/3 mL nebulizer solution (  Canceled Entry 5/26/22 4906)   sodium chloride 0.9% flush 3 mL (has no administration in time range)   predniSONE tablet 40 mg (has no administration in time range)   albuterol-ipratropium 2.5 mg-0.5 mg/3 mL nebulizer solution 3 mL (has no administration in time range)   melatonin tablet 6 mg (has no administration in time range)   ondansetron disintegrating tablet 8 mg (has no administration in time range)   ondansetron injection 4 mg (has no  administration in time range)   polyethylene glycol packet 17 g (has no administration in time range)   acetaminophen tablet 650 mg (has no administration in time range)   hydrALAZINE injection 10 mg (has no administration in time range)   sodium chloride 0.9% flush 10 mL (has no administration in time range)   simethicone chewable tablet 80 mg (has no administration in time range)   aluminum-magnesium hydroxide-simethicone 200-200-20 mg/5 mL suspension 30 mL (has no administration in time range)   naloxone 0.4 mg/mL injection 0.02 mg (has no administration in time range)   glucose chewable tablet 16 g (has no administration in time range)   glucose chewable tablet 24 g (has no administration in time range)   glucagon (human recombinant) injection 1 mg (has no administration in time range)   dextrose 10% bolus 125 mL (has no administration in time range)   dextrose 10% bolus 250 mL (has no administration in time range)   atorvastatin tablet 40 mg (has no administration in time range)   pantoprazole EC tablet 40 mg (has no administration in time range)   tamsulosin 24 hr capsule 0.4 mg (has no administration in time range)   tiotropium bromide 2.5 mcg/actuation inhaler 2 puff (has no administration in time range)   albuterol-ipratropium 2.5 mg-0.5 mg/3 mL nebulizer solution 3 mL (3 mLs Nebulization Given 5/26/22 1705)   albuterol sulfate nebulizer solution 10 mg (10 mg Nebulization Given 5/26/22 1831)       MDM, Impression and Plan   Previous Records:  I decided to obtain old medical records which showed:  History of COPD    Initial Assessment:   Urgent evaluation of 66 y.o. male with history of COPD who presents to the ED with chief complaint of shortness of breath times 2-3 days.  He is noted to be satting 80% on room air with no supplemental oxygen at home.  He denies any chest pain.   Differential Diagnosis:   -COPD exacerbation  -status symptomatic anemia  -ACS  -electrolyte abnormalities  -arrhythmias  -thought  was given to GI bleed because he is having a drop in his hemoglobin yet his Hemoccult was negative and his stool does not appear to be consistent with melena  Independently Interpreted Test(s):   EKG:  I independently reviewed and interpreted the EKG and my findings are as follows:   Please see ED course.  EKG shows sinus tachycardia with occasional PVCs in noted artifact with ventricular rate of 112 beats per minute.  No ST segment elevations depressions.  No STEMI.  IMAGING:  I have ordered and independently interpreted X-rays and my findings are as follow:  Please see ED course.  Chest x-ray shows no pneumonia pneumothorax or pleural effusions but however does appear to be consistent with COPD due to his extended lung fields.    Clinical Tests:   Lab Tests: Ordered and Reviewed  Radiological Study: Ordered and Reviewed  Medical Tests: Ordered and Reviewed    ED Management:    On arrival patient was noted to be afebrile tachycardic with a stable pulse.  I believe his tachycardia was due to his recent inhaler use.  He was noted to be satting 88% on room air which is a new oxygen requirement.  He was given DuoNebs x3 and a a continuous treatment and still required supplemental oxygen to keep his oxygen over 89%.  His hemoglobin is down trending and a rectal exam was performed in his stool was noted to be guaiac negative best I do not believe he is currently having a GI bleed.  His creatinine is near baseline he has no gross electrolyte abnormalities.  Due to his history and slight improvement with breathing treatments I believe that his shortness of breath is due to COPD exacerbation and he may need further treatment and possible initiation of home oxygen thus I called discussed case with Hospital Medicine who was agreeable with admission.  Patient agreeable with admission.  Patient deemed stable for admission to hospital medicine.   Please see ED course for discussion of labs.     I called and discussed the case  with:  Hospital medicine         Final diagnoses:  [R06.02] Shortness of breath  [J44.1] COPD exacerbation (Primary)          ED Disposition Condition    Observation              ED Course as of 05/26/22 2218   Thu May 26, 2022   1953 WBC(!): 3.28 [HM]      ED Course User Index  [HM] MD Yvan Barnard MD   Emergency Resident   [phone] (spectra)    Please note that this dictation was completed with computer voicerecognition software.  Quite often unanticipated grammatical, syntax, homophones, and other interpretive errors are inadvertently transcribed by the computer software.  Please disregard these errors.  Please excuse any errors that have escaped final proofreading.       Yvan Salinas MD  Resident  05/26/22 2218

## 2022-05-26 NOTE — ASSESSMENT & PLAN NOTE
62M with tobacco abuse (>40 pack years), COPD, HTN, HLD, elevated PSA who presents to for follow-up.     Patient severely hypoxic during clinic walk test. Sat dropped to 50-60s while walking down a hallway. Exhibited dyspnea and wheezing after a short 3 minute walk. Placed on 3 L O2 in clinic.   Called ED, patient taken via stretcher and ambulance. Afebrile, hypertensive, tachycardic in clinic. Concern for COPD exacerbation vs PE vs pneumonia. Would not be safe to discharge from clinic. Not on any home O2, but has been told before that he needs it. Patient called his sister and myself and Dr. Costello explained the need to send him to the ED. Sent to the ED for further workup.

## 2022-05-26 NOTE — ED TRIAGE NOTES
Pt reports SOBand low O2 levels. Pt arrives on 2LNC w/ no c/o. Pt reports hx of COPD. Pt denies c/p, n/v/d, abdominal pain.      LOC: The patient is awake, alert and aware of environment with an appropriate affect, the patient is oriented x 3 and speaking appropriately.  APPEARANCE: Patient resting comfortably and in no acute distress, patient is clean and well groomed, patient's clothing is properly fastened.  SKIN: The skin is warm and dry, color consistent with ethnicity, patient has normal skin turgor and moist mucus membranes, skin intact, no breakdown or bruising noted.  MUSCULOSKELETAL: Patient moving all extremities spontaneously, no obvious swelling or deformities noted.  RESPIRATORY: Airway is open and patent, respirations are spontaneous, patient has a normal effort and rate, no accessory muscle use noted,   CARDIAC: Patient has a normal rate and regular rhythm, no periphreal edema noted, capillary refill < 3 seconds.  ABDOMEN: Soft and non tender to palpation, no distention noted, normoactive bowel sounds present in all four quadrants.  NEUROLOGIC:  facial expression is symmetrical, patient moving all extremities spontaneously, normal sensation in all extremities when touched with a finger.  Follows all commands appropriately.

## 2022-05-26 NOTE — PROGRESS NOTES
Clinic Note  5/26/2022      Subjective:       Patient ID:  Avin is a 66 y.o. male being seen for an established care visit.    Chief Complaint: Follow-up    Mr. Reyes is a 62M with tobacco abuse (>40 pack years), COPD, HTN, HLD, elevated PSA who presents to for follow-up. Patient's O2 sat was 84 and 88 upon entering clinic. Repeat O2 sat was 95% after sitting.     Urinary issues -   - Flomax working well  - takes a long time to initiate urine stream  - no pain with urination  - urge to go 3x/day, at night as well  - no hematuria  - no weight loss  - PSA elevated x2  - has not yet seen urology here or [facility]     Left inguinal hernia  - saw general surgery, opted to defer surgery given that it's relatively asymptomatic other than protruding from time to time. Hernia precautions given by surgery  - Current smoker, high risk for general anesthesia. Opted to defer surgery given that's it's relatively asymptomatic.      COPD  - congestion, white sputum production, rarely light green  - dyspnea sometimes  - cough  - needs to catch breath in the morning  - harder to breath especially at night  - Using trelegy as needed: in am   - no hospitalizations for COPD  - Uses inhalers 2 times a day for SOB  - can walk to door before feeling short of breath     Tobacco use: 1/3 pack/day   Cessation program? Has tried gum before. Interested in chantix, cost covered if patient enrolled in tobacco cessation program     From previous visit, screening LD CT revealed bilateral solid pulmonary nodules measuring up to 0.5 cm, new when compared to the previous CTA of 01/15/2018 and emphysema Lung-RADS Category:  2 - Benign Appearance or Behavior - continue annual screening with LDCT in 12 months.      Care gaps addressed:   -one time AAA US screening for tobacco use negative  -Annual Low dose CT for Nov 2022 Lung RADS category 2 - follow up in one year  -Colonoscopy: Colonoscopy? Not yet, referral made in Oct 2021     Vaccinations:  Flu: not  due  Pneumococcal: due PCV13  TDAP: 2018   Shingrix: second dose due  COVID: completed including 2 boosters  HPV: NA    Risk Assessment:  Tobacco use: yes  EtOH use: beer 1x/week  Illicit drug use: quit                Review of Systems   Constitutional: Positive for weight loss. Negative for chills and fever.        Poor appetite   Eyes: Negative.    Respiratory: Positive for cough, sputum production, shortness of breath and wheezing. Negative for hemoptysis.    Cardiovascular: Negative for palpitations and leg swelling.   Genitourinary: Positive for frequency. Negative for dysuria and hematuria.   Musculoskeletal: Negative for back pain and myalgias.       Medication List with Changes/Refills   Current Medications    ALBUTEROL (PROVENTIL HFA) 90 MCG/ACTUATION INHALER    Inhale 2 puffs into the lungs every 6 (six) hours as needed for Wheezing. Rescue    ATORVASTATIN (LIPITOR) 40 MG TABLET    Take 1 tablet (40 mg total) by mouth once daily.    FLUTICASONE-UMECLIDIN-VILANTER (TRELEGY ELLIPTA) 100-62.5-25 MCG DSDV    Inhale 1 puff into the lungs once daily.    IPRATROPIUM-ALBUTEROL (COMBIVENT RESPIMAT)  MCG/ACTUATION INHALER    Inhale 1 puff by mouth four times daily    IPRATROPIUM-ALBUTEROL (COMBIVENT)  MCG/ACTUATION INHALER    Inhale 1 puff into the lungs every 6 (six) hours as needed for Wheezing or Shortness of Breath. Rescue    OMEPRAZOLE (PRILOSEC) 20 MG CAPSULE    Take 1 capsule (20 mg total) by mouth once daily.    TAMSULOSIN (FLOMAX) 0.4 MG CAP    Take 1 capsule (0.4 mg total) by mouth once daily.    TAMSULOSIN (FLOMAX) 0.4 MG CAP    Take 1 capsule (0.4 mg total) by mouth once daily.    TIOTROPIUM (SPIRIVA WITH HANDIHALER) 18 MCG INHALATION CAPSULE    Inhale contents 1 capsule (18 mcg total) via handihaler into the lungs once daily. Controller do not swallow       Patient Active Problem List   Diagnosis    HTN (hypertension)    Hyperlipidemia    Smoking greater than 40 pack years    HERRERA  "(dyspnea on exertion)    COPD (chronic obstructive pulmonary disease)    Low vitamin D level    Benign prostatic hyperplasia without urinary obstruction    Finding of above normal blood pressure    Gastroesophageal reflux disease with esophagitis    Tobacco user    Chronic obstructive pulmonary disease    Elevated PSA    Non-recurrent unilateral inguinal hernia with obstruction without gangrene    Encounter for screening colonoscopy    Hypoxia           Objective:      BP (!) 150/90   Pulse 103   Temp 98.3 °F (36.8 °C)   Resp 18   Ht 6' 1" (1.854 m)   Wt 66.4 kg (146 lb 6.2 oz)   SpO2 100% Comment: on 3 L  BMI 19.31 kg/m²   Estimated body mass index is 19.31 kg/m² as calculated from the following:    Height as of this encounter: 6' 1" (1.854 m).    Weight as of this encounter: 66.4 kg (146 lb 6.2 oz).  Physical Exam  Vitals and nursing note reviewed.   Constitutional:       Appearance: Normal appearance.   HENT:      Head: Normocephalic and atraumatic.   Eyes:      General: No scleral icterus.  Cardiovascular:      Rate and Rhythm: Normal rate and regular rhythm.      Pulses: Normal pulses.      Heart sounds: Normal heart sounds. No murmur heard.  Pulmonary:      Effort: Respiratory distress present.      Breath sounds: Wheezing present. No rales.      Comments: Patient walked for 1.5 minutes down hallway with pulse ox on. Desatted to 50-60%. Came back to room, O2 sat came up to 80% at rest. O2 sat 100 once placed on 3L O2 in clinic  Abdominal:      General: Abdomen is flat. Bowel sounds are normal. There is no distension.      Palpations: Abdomen is soft.      Tenderness: There is no abdominal tenderness. There is no guarding.   Musculoskeletal:      Cervical back: Normal range of motion.      Right lower leg: No edema.   Skin:     General: Skin is warm.      Coloration: Skin is not jaundiced.   Neurological:      General: No focal deficit present.      Mental Status: He is alert and oriented to " person, place, and time.   Psychiatric:         Mood and Affect: Mood normal.         Behavior: Behavior normal.           Assessment and Plan:         Problem List Items Addressed This Visit        Pulmonary    Chronic obstructive pulmonary disease - Primary    Hypoxia    Current Assessment & Plan     62M with tobacco abuse (>40 pack years), COPD, HTN, HLD, elevated PSA who presents to for follow-up.     Patient severely hypoxic during clinic walk test. Sat dropped to 50-60s while walking down a hallway. Exhibited dyspnea and wheezing after a short 3 minute walk. Placed on 3 L O2 in clinic.   Called ED, patient taken via stretcher and ambulance. Afebrile, hypertensive, tachycardic in clinic. Concern for COPD exacerbation vs PE vs pneumonia. Would not be safe to discharge from clinic. Not on any home O2, but has been told before that he needs it. Patient called his sister and myself and Dr. Costello explained the need to send him to the ED. Sent to the ED for further workup.                Cardiac/Vascular    Hyperlipidemia       Renal/    Elevated PSA    Current Assessment & Plan     PSA elevated in 2019, high in 2021. Patient reports worsening obstructive symptoms despite using Flomax. Weight loss, poor appetite, concern for prostate malignancy       - Referral to urology external [facility] made already, patient is under evaluation, unlikely to be seen by [facility] due to insurance beign Medicaid  - faxed referral to [facility] last year  - urgent referral to urology           Relevant Orders    PSA, Screening    Ambulatory referral/consult to Urology       GI    Encounter for screening colonoscopy    Current Assessment & Plan     Referral for colonoscopy placed last visit Oct 2021, has not scheduled.     - another case request order placed  - dicussed FIT test vs colonoscopy with patient, opts for colonoscopy              Other    Smoking greater than 40 pack years    Relevant Orders    Ambulatory referral/consult to  Smoking Cessation Program    Tobacco user    Current Assessment & Plan     Continues to smoke 1/3 pack/day. Referral to smoking cessation program made. Previously contacted by them, unable to follow up.     - consider prescribing chantix without formal enrollment in smoking cessation program               Other Visit Diagnoses     Screening for colon cancer        Relevant Orders    Case Request Endoscopy: COLONOSCOPY (Completed)          Follow Up:   No follow-ups on file.        Ishaan Mcdaniel      Signed:   Ishaan Mcdaniel MD - PGY1  Internal Medicine  [facility]

## 2022-05-27 LAB
ANION GAP SERPL CALC-SCNC: 7 MMOL/L (ref 8–16)
BASOPHILS # BLD AUTO: 0.04 K/UL (ref 0–0.2)
BASOPHILS NFR BLD: 1.1 % (ref 0–1.9)
BUN SERPL-MCNC: 11 MG/DL (ref 8–23)
CALCIUM SERPL-MCNC: 10.2 MG/DL (ref 8.7–10.5)
CHLORIDE SERPL-SCNC: 105 MMOL/L (ref 95–110)
CO2 SERPL-SCNC: 30 MMOL/L (ref 23–29)
CREAT SERPL-MCNC: 0.8 MG/DL (ref 0.5–1.4)
DIFFERENTIAL METHOD: ABNORMAL
EOSINOPHIL # BLD AUTO: 0 K/UL (ref 0–0.5)
EOSINOPHIL NFR BLD: 1.1 % (ref 0–8)
ERYTHROCYTE [DISTWIDTH] IN BLOOD BY AUTOMATED COUNT: 15.2 % (ref 11.5–14.5)
EST. GFR  (AFRICAN AMERICAN): >60 ML/MIN/1.73 M^2
EST. GFR  (NON AFRICAN AMERICAN): >60 ML/MIN/1.73 M^2
GLUCOSE SERPL-MCNC: 94 MG/DL (ref 70–110)
HCT VFR BLD AUTO: 36.7 % (ref 40–54)
HCV AB SERPL QL IA: NEGATIVE
HGB BLD-MCNC: 11.5 G/DL (ref 14–18)
IMM GRANULOCYTES # BLD AUTO: 0 K/UL (ref 0–0.04)
IMM GRANULOCYTES NFR BLD AUTO: 0 % (ref 0–0.5)
LYMPHOCYTES # BLD AUTO: 1.1 K/UL (ref 1–4.8)
LYMPHOCYTES NFR BLD: 30.2 % (ref 18–48)
MAGNESIUM SERPL-MCNC: 2.2 MG/DL (ref 1.6–2.6)
MCH RBC QN AUTO: 29.1 PG (ref 27–31)
MCHC RBC AUTO-ENTMCNC: 31.3 G/DL (ref 32–36)
MCV RBC AUTO: 93 FL (ref 82–98)
MONOCYTES # BLD AUTO: 0.3 K/UL (ref 0.3–1)
MONOCYTES NFR BLD: 9.3 % (ref 4–15)
NEUTROPHILS # BLD AUTO: 2.1 K/UL (ref 1.8–7.7)
NEUTROPHILS NFR BLD: 58.3 % (ref 38–73)
NRBC BLD-RTO: 0 /100 WBC
PHOSPHATE SERPL-MCNC: 3.5 MG/DL (ref 2.7–4.5)
PLATELET # BLD AUTO: 266 K/UL (ref 150–450)
PMV BLD AUTO: 9.8 FL (ref 9.2–12.9)
POTASSIUM SERPL-SCNC: 4.4 MMOL/L (ref 3.5–5.1)
RBC # BLD AUTO: 3.95 M/UL (ref 4.6–6.2)
SODIUM SERPL-SCNC: 142 MMOL/L (ref 136–145)
WBC # BLD AUTO: 3.64 K/UL (ref 3.9–12.7)

## 2022-05-27 PROCEDURE — 99900035 HC TECH TIME PER 15 MIN (STAT)

## 2022-05-27 PROCEDURE — 84100 ASSAY OF PHOSPHORUS: CPT | Performed by: PHYSICIAN ASSISTANT

## 2022-05-27 PROCEDURE — A4216 STERILE WATER/SALINE, 10 ML: HCPCS | Performed by: PHYSICIAN ASSISTANT

## 2022-05-27 PROCEDURE — G0378 HOSPITAL OBSERVATION PER HR: HCPCS

## 2022-05-27 PROCEDURE — 80048 BASIC METABOLIC PNL TOTAL CA: CPT | Performed by: PHYSICIAN ASSISTANT

## 2022-05-27 PROCEDURE — 25000003 PHARM REV CODE 250

## 2022-05-27 PROCEDURE — 85025 COMPLETE CBC W/AUTO DIFF WBC: CPT | Performed by: PHYSICIAN ASSISTANT

## 2022-05-27 PROCEDURE — S4991 NICOTINE PATCH NONLEGEND: HCPCS | Performed by: PHYSICIAN ASSISTANT

## 2022-05-27 PROCEDURE — 27000221 HC OXYGEN, UP TO 24 HOURS

## 2022-05-27 PROCEDURE — 94761 N-INVAS EAR/PLS OXIMETRY MLT: CPT

## 2022-05-27 PROCEDURE — 25000003 PHARM REV CODE 250: Performed by: PHYSICIAN ASSISTANT

## 2022-05-27 PROCEDURE — 94640 AIRWAY INHALATION TREATMENT: CPT | Mod: XB

## 2022-05-27 PROCEDURE — 99226 PR SUBSEQUENT OBSERVATION CARE,LEVEL III: CPT | Mod: ,,,

## 2022-05-27 PROCEDURE — 25000242 PHARM REV CODE 250 ALT 637 W/ HCPCS: Performed by: PHYSICIAN ASSISTANT

## 2022-05-27 PROCEDURE — 83735 ASSAY OF MAGNESIUM: CPT | Performed by: PHYSICIAN ASSISTANT

## 2022-05-27 PROCEDURE — 99226 PR SUBSEQUENT OBSERVATION CARE,LEVEL III: ICD-10-PCS | Mod: ,,,

## 2022-05-27 PROCEDURE — 36415 COLL VENOUS BLD VENIPUNCTURE: CPT | Performed by: PHYSICIAN ASSISTANT

## 2022-05-27 PROCEDURE — 63600175 PHARM REV CODE 636 W HCPCS: Performed by: PHYSICIAN ASSISTANT

## 2022-05-27 RX ADMIN — LEVOFLOXACIN 750 MG: 250 TABLET, FILM COATED ORAL at 08:05

## 2022-05-27 RX ADMIN — ATORVASTATIN CALCIUM 40 MG: 40 TABLET, FILM COATED ORAL at 08:05

## 2022-05-27 RX ADMIN — NICOTINE 1 PATCH: 14 PATCH, EXTENDED RELEASE TRANSDERMAL at 08:05

## 2022-05-27 RX ADMIN — PANTOPRAZOLE SODIUM 40 MG: 40 TABLET, DELAYED RELEASE ORAL at 08:05

## 2022-05-27 RX ADMIN — Medication 10 ML: at 08:05

## 2022-05-27 RX ADMIN — IPRATROPIUM BROMIDE AND ALBUTEROL SULFATE 3 ML: 2.5; .5 SOLUTION RESPIRATORY (INHALATION) at 08:05

## 2022-05-27 RX ADMIN — Medication 10 ML: at 06:05

## 2022-05-27 RX ADMIN — PREDNISONE 40 MG: 20 TABLET ORAL at 08:05

## 2022-05-27 RX ADMIN — TIOTROPIUM BROMIDE INHALATION SPRAY 2 PUFF: 3.12 SPRAY, METERED RESPIRATORY (INHALATION) at 10:05

## 2022-05-27 RX ADMIN — TAMSULOSIN HYDROCHLORIDE 0.4 MG: 0.4 CAPSULE ORAL at 08:05

## 2022-05-27 RX ADMIN — POLYETHYLENE GLYCOL 3350 17 G: 17 POWDER, FOR SOLUTION ORAL at 08:05

## 2022-05-27 RX ADMIN — IPRATROPIUM BROMIDE AND ALBUTEROL SULFATE 3 ML: 2.5; .5 SOLUTION RESPIRATORY (INHALATION) at 01:05

## 2022-05-27 RX ADMIN — Medication 10 ML: at 01:05

## 2022-05-27 RX ADMIN — GUAIFENESIN AND DEXTROMETHORPHAN HYDROBROMIDE 1 TABLET: 600; 30 TABLET, EXTENDED RELEASE ORAL at 03:05

## 2022-05-27 NOTE — HOSPITAL COURSE
Patient placed in observation for COPD exacerbation. ABG reassuring. Started on scheduled duonebs, prednisone, and Levaquin. Patient requiring 2L NC to keep O2 sats >93%, which is new from baseline. 6MWT with evidence of O2 desaturation upon ambulation. Intermittent episodes of tachycardia, d-dimer ordered due to concern of PE. D-dimer positive, VQ scan with low probability of PE. CT chest without contrast with severe panlobular emphysematous changes. Patient reports improvement in SOB and productive cough. Patient stable for discharge with oxygen. Instructed patient to follow-up with PCP within 1-2 weeks. Referral placed for COPD clinic. Return precautions given and patient verbalized understanding. All questions answered.

## 2022-05-27 NOTE — ASSESSMENT & PLAN NOTE
SOB  Fatigue    Patient's COPD is with exacerbation noted by continued dyspnea and worsening of baseline hypoxia currently.  Patient is currently on COPD Pathway. Continue scheduled inhalers Steroids, Antibiotics and Supplemental oxygen and monitor respiratory status closely.   - 2L O2 NC, wean as tolerated.   - scheduled duonebs q6h  - continue home spiriva   - prednisone 40 mg qd x5 days last day 6/1  - levaquin 750 mg qd x5 days last day 6/1 (Qtc 444)  - fall precautions

## 2022-05-27 NOTE — ASSESSMENT & PLAN NOTE
Dangers of cigarette smoking were reviewed with patient in detail for 5 minutes and patient was encouraged to quit. Nicotine replacement options were discussed.  - Patient states that he is planning to quit

## 2022-05-27 NOTE — SUBJECTIVE & OBJECTIVE
Interval History: Patient seen and examined today. PATRIAON. Patient continues to require 2L NC, will wean as tolerated. Added mucinex as patient is complaining of chest congestion. Also reports yellow/green sputum production, continue Levaquin. He otherwise states that he is feeling slightly better today.    Review of Systems   Constitutional:  Positive for activity change and fatigue. Negative for chills, diaphoresis and fever.   HENT:  Negative for congestion, facial swelling, rhinorrhea and sore throat.    Eyes:  Negative for photophobia, itching and visual disturbance.   Respiratory:  Positive for cough (chronic, productive), chest tightness and shortness of breath. Negative for wheezing.    Cardiovascular:  Negative for chest pain, palpitations and leg swelling.   Gastrointestinal:  Negative for abdominal distention, abdominal pain, blood in stool, constipation, diarrhea, nausea and vomiting.   Genitourinary:  Negative for difficulty urinating, dysuria, frequency, hematuria and urgency.   Musculoskeletal:  Positive for back pain (chronic R upper back pain). Negative for arthralgias and neck stiffness.   Neurological:  Negative for dizziness, tremors, seizures, syncope, weakness, light-headedness, numbness and headaches.   Psychiatric/Behavioral:  Negative for agitation, confusion and hallucinations.    Objective:     Vital Signs (Most Recent):  Temp: 97.9 °F (36.6 °C) (05/27/22 1148)  Pulse: 97 (05/27/22 1329)  Resp: 20 (05/27/22 1329)  BP: 122/66 (05/27/22 1148)  SpO2: 95 % (05/27/22 1329) Vital Signs (24h Range):  Temp:  [96.6 °F (35.9 °C)-98.5 °F (36.9 °C)] 97.9 °F (36.6 °C)  Pulse:  [] 97  Resp:  [14-21] 20  SpO2:  [92 %-100 %] 95 %  BP: (117-172)/(66-84) 122/66     Weight: 70.8 kg (156 lb)  Body mass index is 20.58 kg/m².  No intake or output data in the 24 hours ending 05/27/22 1512   Physical Exam  Vitals and nursing note reviewed.   Constitutional:       General: He is not in acute distress.      Appearance: He is well-developed and underweight. He is not ill-appearing or diaphoretic.      Interventions: Nasal cannula in place.   HENT:      Head: Normocephalic and atraumatic.      Right Ear: External ear normal.      Left Ear: External ear normal.      Nose: Nose normal. No congestion.      Mouth/Throat:      Pharynx: Oropharynx is clear.   Eyes:      General: No scleral icterus.     Extraocular Movements: Extraocular movements intact.   Cardiovascular:      Rate and Rhythm: Normal rate and regular rhythm.      Pulses: Normal pulses.      Heart sounds: Normal heart sounds. No murmur heard.  Pulmonary:      Effort: Pulmonary effort is normal. No respiratory distress.      Breath sounds: Decreased air movement (throughout) present. Decreased breath sounds (throughout) present. No wheezing or rales.   Abdominal:      General: Bowel sounds are normal. There is no distension.      Palpations: Abdomen is soft.      Tenderness: There is no abdominal tenderness.      Hernia: A hernia is present.   Musculoskeletal:      Cervical back: Normal range of motion.      Right lower leg: No edema.      Left lower leg: No edema.   Skin:     General: Skin is warm and dry.      Capillary Refill: Capillary refill takes less than 2 seconds.   Neurological:      General: No focal deficit present.      Mental Status: He is alert and oriented to person, place, and time. Mental status is at baseline.   Psychiatric:         Mood and Affect: Mood normal.         Behavior: Behavior normal.         Thought Content: Thought content normal.       Significant Labs: All pertinent labs within the past 24 hours have been reviewed.  ABGs:   Recent Labs   Lab 05/26/22  2131   PH 7.365   PCO2 55.9*   HCO3 31.9*   POCSATURATED 98   BE 7   PO2 104*     CBC:   Recent Labs   Lab 05/26/22 1830 05/27/22  0248   WBC 3.28* 3.64*   HGB 11.1* 11.5*   HCT 36.9* 36.7*    266     CMP:   Recent Labs   Lab 05/26/22 1830 05/27/22  0248    142   K  4.3 4.4    105   CO2 31* 30*   GLU 81 94   BUN 8 11   CREATININE 0.7 0.8   CALCIUM 9.6 10.2   PROT 6.1  --    ALBUMIN 3.5  --    BILITOT 0.9  --    ALKPHOS 47*  --    AST 16  --    ALT 13  --    ANIONGAP 7* 7*   EGFRNONAA >60.0 >60.0       Significant Imaging: I have reviewed all pertinent imaging results/findings within the past 24 hours.

## 2022-05-27 NOTE — PLAN OF CARE
[facility]  Discharge Assessment    Primary Care Provider: ALLIE YANES JR, MD     Discharge Assessment (most recent)     BRIEF DISCHARGE ASSESSMENT - 05/27/22 1152        Discharge Planning    Assessment Type Discharge Planning Brief Assessment     Resource/Environmental Concerns none     Support Systems Family members     Equipment Currently Used at Home none     Patient/Family Anticipates Transition to home     Patient/Family Anticipated Services at Transition none     DME Needed Upon Discharge  none     Discharge Plan A Home     Discharge Plan B Home               Pt is a 66 y.o. Male admitted with COPD, and has a PMH of COPD and HTN. He lives with his sister and Daughter in a single story house with no steps. He has not required DME in the past and is independent in all of his ADLs and IADLs. [facility] Dis[facility] Packet given to patient and/or family with understanding verbalized.   name and number and estimated discharge date written on white board in patient's room with request to call for any questions or concerns.  Will continue to follow for needs.  Michel Parra RN,BSN

## 2022-05-27 NOTE — PROGRESS NOTES
"[facility]  Progress Note    Patient Name: Avni Reyes  MRN: [id number]  Patient Class: OP- Observation   Admission Date: 5/26/2022  Length of Stay: 0 days  Attending Physician: Daniel Messer MD  Primary Care Provider: ALLIE YANES JR, MD        Subjective:     Principal Problem:COPD (chronic obstructive pulmonary disease)        HPI:  Mr. Reyes is a 66 y.o. male with COPD, HTN, HLD, BPH presenting w/ c/o shortness of breath x1 week. Pt states he been HERRERA for "a long time" that usually improves w/ use of his inhaler. He reports increased usage of his inhalers, using it 2-3 times this morning alone. He saw his PCP this am for a previously established appointment where he was found to be desatting while walking and was subsequently sent to [facility] ED.  He denies any new fever. He endorses a chronic cough that has recently become productive green sputum. Pt further denies any chills, myalgias, congestion, sore throat, chest pain, n/v/d, abdominal pain, dark or tarry stools, BRBPR, decreased UOP, lower extremity edema.    In ED, Pt afebrile, tachycardic, hypertensive, SPO2 % on NC. CBC was notable for a normocytic anemia H/H 11.1/36.9. CMP showed a HCO3 31, AG 7. ABG pH 7.36, PCO2 55.9, PO2 104, HCO3 31.9. CXR revealing the lungs are symmetrically expanded. Stable right lung base linear opacity likely atelectasis. There are changes of pulmonary emphysema. No large consolidation, effusion or pneumothorax. Pt on 2L NC, s/p duonebs x2. Initiated on COPD pathway.      Overview/Hospital Course:  Patient placed in observation for COPD exacerbation. ABC reassuring. Started on scheduled duonebs, prednisone, and Levaquin. Patient requiring 2L NC to keep O2 sats >93%, will wean as tolerated.       Interval History: Patient seen and examined today. NAEON. Patient continues to require 2L NC, will wean as tolerated. Added mucinex as patient is complaining of chest congestion. Also reports " yellow/green sputum production, continue Levaquin. He otherwise states that he is feeling slightly better today.    Review of Systems   Constitutional:  Positive for activity change and fatigue. Negative for chills, diaphoresis and fever.   HENT:  Negative for congestion, facial swelling, rhinorrhea and sore throat.    Eyes:  Negative for photophobia, itching and visual disturbance.   Respiratory:  Positive for cough (chronic, productive), chest tightness and shortness of breath. Negative for wheezing.    Cardiovascular:  Negative for chest pain, palpitations and leg swelling.   Gastrointestinal:  Negative for abdominal distention, abdominal pain, blood in stool, constipation, diarrhea, nausea and vomiting.   Genitourinary:  Negative for difficulty urinating, dysuria, frequency, hematuria and urgency.   Musculoskeletal:  Positive for back pain (chronic R upper back pain). Negative for arthralgias and neck stiffness.   Neurological:  Negative for dizziness, tremors, seizures, syncope, weakness, light-headedness, numbness and headaches.   Psychiatric/Behavioral:  Negative for agitation, confusion and hallucinations.    Objective:     Vital Signs (Most Recent):  Temp: 97.9 °F (36.6 °C) (05/27/22 1148)  Pulse: 97 (05/27/22 1329)  Resp: 20 (05/27/22 1329)  BP: 122/66 (05/27/22 1148)  SpO2: 95 % (05/27/22 1329) Vital Signs (24h Range):  Temp:  [96.6 °F (35.9 °C)-98.5 °F (36.9 °C)] 97.9 °F (36.6 °C)  Pulse:  [] 97  Resp:  [14-21] 20  SpO2:  [92 %-100 %] 95 %  BP: (117-172)/(66-84) 122/66     Weight: 70.8 kg (156 lb)  Body mass index is 20.58 kg/m².  No intake or output data in the 24 hours ending 05/27/22 1512   Physical Exam  Vitals and nursing note reviewed.   Constitutional:       General: He is not in acute distress.     Appearance: He is well-developed and underweight. He is not ill-appearing or diaphoretic.      Interventions: Nasal cannula in place.   HENT:      Head: Normocephalic and atraumatic.      Right  Ear: External ear normal.      Left Ear: External ear normal.      Nose: Nose normal. No congestion.      Mouth/Throat:      Pharynx: Oropharynx is clear.   Eyes:      General: No scleral icterus.     Extraocular Movements: Extraocular movements intact.   Cardiovascular:      Rate and Rhythm: Normal rate and regular rhythm.      Pulses: Normal pulses.      Heart sounds: Normal heart sounds. No murmur heard.  Pulmonary:      Effort: Pulmonary effort is normal. No respiratory distress.      Breath sounds: Decreased air movement (throughout) present. Decreased breath sounds (throughout) present. No wheezing or rales.   Abdominal:      General: Bowel sounds are normal. There is no distension.      Palpations: Abdomen is soft.      Tenderness: There is no abdominal tenderness.      Hernia: A hernia is present.   Musculoskeletal:      Cervical back: Normal range of motion.      Right lower leg: No edema.      Left lower leg: No edema.   Skin:     General: Skin is warm and dry.      Capillary Refill: Capillary refill takes less than 2 seconds.   Neurological:      General: No focal deficit present.      Mental Status: He is alert and oriented to person, place, and time. Mental status is at baseline.   Psychiatric:         Mood and Affect: Mood normal.         Behavior: Behavior normal.         Thought Content: Thought content normal.       Significant Labs: All pertinent labs within the past 24 hours have been reviewed.  ABGs:   Recent Labs   Lab 05/26/22  2131   PH 7.365   PCO2 55.9*   HCO3 31.9*   POCSATURATED 98   BE 7   PO2 104*     CBC:   Recent Labs   Lab 05/26/22  1830 05/27/22  0248   WBC 3.28* 3.64*   HGB 11.1* 11.5*   HCT 36.9* 36.7*    266     CMP:   Recent Labs   Lab 05/26/22  1830 05/27/22  0248    142   K 4.3 4.4    105   CO2 31* 30*   GLU 81 94   BUN 8 11   CREATININE 0.7 0.8   CALCIUM 9.6 10.2   PROT 6.1  --    ALBUMIN 3.5  --    BILITOT 0.9  --    ALKPHOS 47*  --    AST 16  --    ALT 13   --    ANIONGAP 7* 7*   EGFRNONAA >60.0 >60.0       Significant Imaging: I have reviewed all pertinent imaging results/findings within the past 24 hours.      Assessment/Plan:      * COPD (chronic obstructive pulmonary disease)  SOB  Fatigue    Patient's COPD is with exacerbation noted by continued dyspnea and worsening of baseline hypoxia currently.  Patient is currently on COPD Pathway. Continue scheduled inhalers Steroids, Antibiotics and Supplemental oxygen and monitor respiratory status closely.   - 2L O2 NC, wean as tolerated.   - scheduled duonebs q6h  - continue home spiriva   - prednisone 40 mg qd x5 days last day 6/1  - levaquin 750 mg qd x5 days last day 6/1 (Qtc 444)  - fall precautions      Acute respiratory failure with hypoxia and hypercarbia  Patient with Hypercapnic and Hypoxic Respiratory failure which is Acute.  he is not on home oxygen. Supplemental oxygen was provided and noted % on 2L NC  .   Signs/symptoms of respiratory failure include- tachypnea and increased work of breathing. Contributing diagnoses includes - COPD Labs and images were reviewed. Patient Has recent ABG, which has been reviewed. Will treat underlying causes and adjust management of respiratory failure as follows  -2L O2 NC, wean as tolerated  -see COPD    Gastroesophageal reflux disease with esophagitis  - protonix 40 mg qd      Benign prostatic hyperplasia without urinary obstruction  - continue home flomax qd      Smoking greater than 40 pack years  Dangers of cigarette smoking were reviewed with patient in detail for 5 minutes and patient was encouraged to quit. Nicotine replacement options were discussed.  - Patient states that he is planning to quit      Hyperlipidemia  - continue home atorvastatin 40 mg qd      HTN (hypertension)  - not currently on OP antihypertensives  - will monitor BP q4h  - prn hydralazine for SBP >180        VTE Risk Mitigation (From admission, onward)         Ordered     Place sequential  compression device  Until discontinued         05/26/22 2119     IP VTE LOW RISK PATIENT  Once         05/26/22 2119                Discharge Planning   LAKEISHA: 5/28/2022     Code Status: Full Code   Is the patient medically ready for discharge?:     Reason for patient still in hospital (select all that apply): Patient trending condition and Treatment  Discharge Plan A: Home                  Flavia Barton PA-C  Department of Hospital Medicine   [facility]

## 2022-05-27 NOTE — HPI
"Mr. Reyes is a 66 y.o. male with COPD, HTN, HLD, BPH presenting w/ c/o shortness of breath x1 week. Pt states he been HERRERA for "a long time" that usually improves w/ use of his inhaler. He reports increased usage of his inhalers, using it 2-3 times this morning alone. He saw his PCP this am for a previously established appointment where he was found to be desatting while walking and was subsequently sent to [facility] ED.  He denies any new fever. He endorses a chronic cough that has recently become productive green sputum. Pt further denies any chills, myalgias, congestion, sore throat, chest pain, n/v/d, abdominal pain, dark or tarry stools, BRBPR, decreased UOP, lower extremity edema.    In ED, Pt afebrile, tachycardic, hypertensive, SPO2 % on NC. CBC was notable for a normocytic anemia H/H 11.1/36.9. CMP showed a HCO3 31, AG 7. ABG pH 7.36, PCO2 55.9, PO2 104, HCO3 31.9. CXR revealing the lungs are symmetrically expanded. Stable right lung base linear opacity likely atelectasis. There are changes of pulmonary emphysema. No large consolidation, effusion or pneumothorax. Pt on 2L NC, s/p duonebs x2. Initiated on COPD pathway.  "

## 2022-05-27 NOTE — SUBJECTIVE & OBJECTIVE
Past Medical History:   Diagnosis Date    COPD (chronic obstructive pulmonary disease)     Hyperlipidemia     Hypertension        Past Surgical History:   Procedure Laterality Date    COLONOSCOPY      2009. Unknown results but pathology samples taken per patient.     LEG SURGERY      seconndary to [facility]- Right leg       Review of patient's allergies indicates:  No Known Allergies    No current facility-administered medications on file prior to encounter.     Current Outpatient Medications on File Prior to Encounter   Medication Sig    albuterol (PROVENTIL HFA) 90 mcg/actuation inhaler Inhale 2 puffs into the lungs every 6 (six) hours as needed for Wheezing. Rescue    atorvastatin (LIPITOR) 40 MG tablet Take 1 tablet (40 mg total) by mouth once daily.    fluticasone-umeclidin-vilanter (TRELEGY ELLIPTA) 100-62.5-25 mcg DsDv Inhale 1 puff into the lungs once daily.    ipratropium-albuteroL (COMBIVENT RESPIMAT)  mcg/actuation inhaler Inhale 1 puff by mouth four times daily    ipratropium-albuterol (COMBIVENT)  mcg/actuation inhaler Inhale 1 puff into the lungs every 6 (six) hours as needed for Wheezing or Shortness of Breath. Rescue    omeprazole (PRILOSEC) 20 MG capsule Take 1 capsule (20 mg total) by mouth once daily.    tamsulosin (FLOMAX) 0.4 mg Cap Take 1 capsule (0.4 mg total) by mouth once daily.    tamsulosin (FLOMAX) 0.4 mg Cap Take 1 capsule (0.4 mg total) by mouth once daily.    tiotropium (SPIRIVA WITH HANDIHALER) 18 mcg inhalation capsule Inhale contents 1 capsule (18 mcg total) via handihaler into the lungs once daily. Controller do not swallow     Family History       Problem Relation (Age of Onset)    Diabetes Father    Heart disease Mother, Father    Hyperlipidemia Mother, Father          Tobacco Use    Smoking status: Current Some Day Smoker     Packs/day: 1.50     Years: 39.00     Pack years: 58.50     Types: Cigarettes     Start date: 12/16/1967    Smokeless tobacco: Never Used   Substance  and Sexual Activity    Alcohol use: Yes     Comment: social    Drug use: No    Sexual activity: Yes     Partners: Female     Review of Systems   Constitutional:  Positive for activity change and fatigue. Negative for chills, diaphoresis and fever.   HENT:  Negative for congestion, facial swelling, rhinorrhea and sore throat.    Eyes:  Negative for photophobia, itching and visual disturbance.   Respiratory:  Positive for cough (chronic, productive), chest tightness and shortness of breath. Negative for wheezing.    Cardiovascular:  Negative for chest pain, palpitations and leg swelling.   Gastrointestinal:  Negative for abdominal distention, abdominal pain, blood in stool, constipation, diarrhea, nausea and vomiting.   Genitourinary:  Negative for difficulty urinating, dysuria, frequency, hematuria and urgency.   Musculoskeletal:  Positive for back pain (chronic R upper back pain). Negative for arthralgias and neck stiffness.   Neurological:  Negative for dizziness, tremors, seizures, syncope, weakness, light-headedness, numbness and headaches.   Psychiatric/Behavioral:  Negative for agitation, confusion and hallucinations.    Objective:     Vital Signs (Most Recent):  Temp: 98.5 °F (36.9 °C) (05/26/22 1536)  Pulse: 96 (05/26/22 2135)  Resp: (!) 21 (05/26/22 1906)  BP: (!) 144/83 (05/26/22 1906)  SpO2: 99 % (05/26/22 2135)   Vital Signs (24h Range):  Temp:  [98.3 °F (36.8 °C)-98.5 °F (36.9 °C)] 98.5 °F (36.9 °C)  Pulse:  [] 96  Resp:  [14-21] 21  SpO2:  [52 %-100 %] 99 %  BP: (127-172)/(78-90) 144/83     Weight: 65.9 kg (145 lb 4.5 oz)  Body mass index is 19.17 kg/m².    Physical Exam  Vitals and nursing note reviewed.   Constitutional:       General: He is not in acute distress.     Appearance: He is well-developed and underweight. He is not ill-appearing or diaphoretic.      Interventions: Nasal cannula in place.   HENT:      Head: Normocephalic and atraumatic.      Right Ear: External ear normal.      Left  Ear: External ear normal.      Nose: Nose normal. No congestion.      Mouth/Throat:      Pharynx: Oropharynx is clear.   Eyes:      General: No scleral icterus.     Extraocular Movements: Extraocular movements intact.   Cardiovascular:      Rate and Rhythm: Normal rate and regular rhythm.      Pulses: Normal pulses.      Heart sounds: Normal heart sounds. No murmur heard.  Pulmonary:      Effort: Pulmonary effort is normal. No respiratory distress.      Breath sounds: Decreased air movement (throughout) present. Decreased breath sounds (throughout) present. No wheezing or rales.   Abdominal:      General: Bowel sounds are normal. There is no distension.      Palpations: Abdomen is soft.      Tenderness: There is no abdominal tenderness. There is no guarding or rebound.      Hernia: A hernia is present.   Musculoskeletal:      Cervical back: Normal range of motion.      Right lower leg: No edema.      Left lower leg: No edema.   Skin:     General: Skin is warm and dry.      Capillary Refill: Capillary refill takes less than 2 seconds.   Neurological:      General: No focal deficit present.      Mental Status: He is alert and oriented to person, place, and time. Mental status is at baseline.   Psychiatric:         Mood and Affect: Mood normal.         Behavior: Behavior normal.         Thought Content: Thought content normal.           Significant Labs: All pertinent labs within the past 24 hours have been reviewed.  ABGs:   Recent Labs   Lab 05/26/22  2131   PH 7.365   PCO2 55.9*   HCO3 31.9*   POCSATURATED 98   BE 7   PO2 104*     CBC:   Recent Labs   Lab 05/26/22  1830   WBC 3.28*   HGB 11.1*   HCT 36.9*        CMP:   Recent Labs   Lab 05/26/22  1830      K 4.3      CO2 31*   GLU 81   BUN 8   CREATININE 0.7   CALCIUM 9.6   PROT 6.1   ALBUMIN 3.5   BILITOT 0.9   ALKPHOS 47*   AST 16   ALT 13   ANIONGAP 7*   EGFRNONAA >60.0       Significant Imaging: I have reviewed all pertinent imaging  results/findings within the past 24 hours.      Imaging Results              X-Ray Chest AP Portable (Final result)  Result time 05/26/22 19:13:20      Final result by Dany Mulligan MD (05/26/22 19:13:20)                   Impression:      No significant change from 04/10/2019.    No radiographic evidence of CHF.    Electronically signed by resident: Geeta Horn  Date:    05/26/2022  Time:    18:41    Electronically signed by: Dany Mulligan MD  Date:    05/26/2022  Time:    19:13               Narrative:    EXAMINATION:  XR CHEST AP PORTABLE    CLINICAL HISTORY:  CHF;.    TECHNIQUE:  Single frontal portable view of the chest was performed.    COMPARISON:  CT chest 11/08/2021 and chest x-ray 04/10/2019    FINDINGS:  Right cardiac leads project over the chest and upper abdomen.    The lungs are symmetrically expanded.  Stable right lung base linear opacity likely atelectasis.  There are changes of pulmonary emphysema.  No large consolidation, effusion or pneumothorax.    Cardiomediastinal silhouette is stable.  No free air beneath the hemidiaphragms.    Bones are intact.

## 2022-05-27 NOTE — PROGRESS NOTES
I have reviewed the notes, assessments, and/or procedures performed this visit, and I concur with the documentation for . Avni Reyes.    Patient de-satted to SpO2 50-60% after walking, also tachycardic and hypertensive and required 3L O2 (not on home O2) at rest. Sent to the ED for further workup. Will need follow up after hospital discharge.    Nathalie Costello MD  Hospital Medicine Assistant Staff  [facility]

## 2022-05-27 NOTE — ASSESSMENT & PLAN NOTE
SOB  Fatigue    Patient's COPD is with exacerbation noted by continued dyspnea and worsening of baseline hypoxia currently.  Patient is currently on COPD Pathway. Continue scheduled inhalers Steroids, Antibiotics and Supplemental oxygen and monitor respiratory status closely.   -2L O2 NC, wean as tolerated.   -scheduled duonebs q6h  -continue home spiriva   -prednisone 40 mg qd x5 days last day 6/1  -levaquin 750 mg qd x5 days last day 6/1 (Qtc 444)  -fall precautions

## 2022-05-27 NOTE — PLAN OF CARE
Pt Aox4. VSS. No signs of respiratory distress on 2LNC. Pt remained injury free through shift. Will continue POC.

## 2022-05-27 NOTE — H&P
"[facility] - Emergency Dept  [facility]  History & Physical    Patient Name: Avni Reyes  MRN: [id number]  Patient Class: OP- Observation  Admission Date: 5/26/2022  Attending Physician: Ramirez Jiames MD   Primary Care Provider: Ishaan Mcdaniel MD         Patient information was obtained from patient, past medical records and ER records.     Subjective:     Principal Problem:COPD (chronic obstructive pulmonary disease)    Chief Complaint:   Chief Complaint   Patient presents with    Shortness of Breath     Low O2 saturation on room air in 60"s while walking  at primary care clinic  Pt was then placed on O2 at 4L/min per nasal cannula and pulse Ox O2 sat was 100 % as stated by EMS  EMS decrease O2 to 2 L/min per nasal cannula with pulse ox now at 100 %        HPI: Mr. Reyes is a 66 y.o. male with COPD, HTN, HLD, BPH presenting w/ c/o shortness of breath x1 week. Pt states he been HERRERA for "a long time" that usually improves w/ use of his inhaler. He reports increased usage of his inhalers, using it 2-3 times this morning alone. He saw his PCP this am for a previously established appointment where he was found to be desatting while walking and was subsequently sent to [facility] ED.  He denies any new fever. He endorses a chronic cough that has recently become productive green sputum. Pt further denies any chills, myalgias, congestion, sore throat, chest pain, n/v/d, abdominal pain, dark or tarry stools, BRBPR, decreased UOP, lower extremity edema.    In ED, Pt afebrile, tachycardic, hypertensive, SPO2 % on NC. CBC was notable for a normocytic anemia H/H 11.1/36.9. CMP showed a HCO3 31, AG 7. ABG pH 7.36, PCO2 55.9, PO2 104, HCO3 31.9. CXR revealing the lungs are symmetrically expanded. Stable right lung base linear opacity likely atelectasis. There are changes of pulmonary emphysema. No large consolidation, effusion or pneumothorax. Pt on 2L NC, s/p duonebs x2. Initiated on COPD pathway.      Past Medical " History:   Diagnosis Date    COPD (chronic obstructive pulmonary disease)     Hyperlipidemia     Hypertension        Past Surgical History:   Procedure Laterality Date    COLONOSCOPY      2009. Unknown results but pathology samples taken per patient.     LEG SURGERY      seconndary to [facility]- Right leg       Review of patient's allergies indicates:  No Known Allergies    No current facility-administered medications on file prior to encounter.     Current Outpatient Medications on File Prior to Encounter   Medication Sig    albuterol (PROVENTIL HFA) 90 mcg/actuation inhaler Inhale 2 puffs into the lungs every 6 (six) hours as needed for Wheezing. Rescue    atorvastatin (LIPITOR) 40 MG tablet Take 1 tablet (40 mg total) by mouth once daily.    fluticasone-umeclidin-vilanter (TRELEGY ELLIPTA) 100-62.5-25 mcg DsDv Inhale 1 puff into the lungs once daily.    ipratropium-albuteroL (COMBIVENT RESPIMAT)  mcg/actuation inhaler Inhale 1 puff by mouth four times daily    ipratropium-albuterol (COMBIVENT)  mcg/actuation inhaler Inhale 1 puff into the lungs every 6 (six) hours as needed for Wheezing or Shortness of Breath. Rescue    omeprazole (PRILOSEC) 20 MG capsule Take 1 capsule (20 mg total) by mouth once daily.    tamsulosin (FLOMAX) 0.4 mg Cap Take 1 capsule (0.4 mg total) by mouth once daily.    tamsulosin (FLOMAX) 0.4 mg Cap Take 1 capsule (0.4 mg total) by mouth once daily.    tiotropium (SPIRIVA WITH HANDIHALER) 18 mcg inhalation capsule Inhale contents 1 capsule (18 mcg total) via handihaler into the lungs once daily. Controller do not swallow     Family History       Problem Relation (Age of Onset)    Diabetes Father    Heart disease Mother, Father    Hyperlipidemia Mother, Father          Tobacco Use    Smoking status: Current Some Day Smoker     Packs/day: 1.50     Years: 39.00     Pack years: 58.50     Types: Cigarettes     Start date: 12/16/1967    Smokeless tobacco: Never Used    Substance and Sexual Activity    Alcohol use: Yes     Comment: social    Drug use: No    Sexual activity: Yes     Partners: Female     Review of Systems   Constitutional:  Positive for activity change and fatigue. Negative for chills, diaphoresis and fever.   HENT:  Negative for congestion, facial swelling, rhinorrhea and sore throat.    Eyes:  Negative for photophobia, itching and visual disturbance.   Respiratory:  Positive for cough (chronic, productive), chest tightness and shortness of breath. Negative for wheezing.    Cardiovascular:  Negative for chest pain, palpitations and leg swelling.   Gastrointestinal:  Negative for abdominal distention, abdominal pain, blood in stool, constipation, diarrhea, nausea and vomiting.   Genitourinary:  Negative for difficulty urinating, dysuria, frequency, hematuria and urgency.   Musculoskeletal:  Positive for back pain (chronic R upper back pain). Negative for arthralgias and neck stiffness.   Neurological:  Negative for dizziness, tremors, seizures, syncope, weakness, light-headedness, numbness and headaches.   Psychiatric/Behavioral:  Negative for agitation, confusion and hallucinations.    Objective:     Vital Signs (Most Recent):  Temp: 98.5 °F (36.9 °C) (05/26/22 1536)  Pulse: 96 (05/26/22 2135)  Resp: (!) 21 (05/26/22 1906)  BP: (!) 144/83 (05/26/22 1906)  SpO2: 99 % (05/26/22 2135)   Vital Signs (24h Range):  Temp:  [98.3 °F (36.8 °C)-98.5 °F (36.9 °C)] 98.5 °F (36.9 °C)  Pulse:  [] 96  Resp:  [14-21] 21  SpO2:  [52 %-100 %] 99 %  BP: (127-172)/(78-90) 144/83     Weight: 65.9 kg (145 lb 4.5 oz)  Body mass index is 19.17 kg/m².    Physical Exam  Vitals and nursing note reviewed.   Constitutional:       General: He is not in acute distress.     Appearance: He is well-developed and underweight. He is not ill-appearing or diaphoretic.      Interventions: Nasal cannula in place.   HENT:      Head: Normocephalic and atraumatic.      Right Ear: External ear  normal.      Left Ear: External ear normal.      Nose: Nose normal. No congestion.      Mouth/Throat:      Pharynx: Oropharynx is clear.   Eyes:      General: No scleral icterus.     Extraocular Movements: Extraocular movements intact.   Cardiovascular:      Rate and Rhythm: Normal rate and regular rhythm.      Pulses: Normal pulses.      Heart sounds: Normal heart sounds. No murmur heard.  Pulmonary:      Effort: Pulmonary effort is normal. No respiratory distress.      Breath sounds: Decreased air movement (throughout) present. Decreased breath sounds (throughout) present. No wheezing or rales.   Abdominal:      General: Bowel sounds are normal. There is no distension.      Palpations: Abdomen is soft.      Tenderness: There is no abdominal tenderness. There is no guarding or rebound.      Hernia: A hernia is present.   Musculoskeletal:      Cervical back: Normal range of motion.      Right lower leg: No edema.      Left lower leg: No edema.   Skin:     General: Skin is warm and dry.      Capillary Refill: Capillary refill takes less than 2 seconds.   Neurological:      General: No focal deficit present.      Mental Status: He is alert and oriented to person, place, and time. Mental status is at baseline.   Psychiatric:         Mood and Affect: Mood normal.         Behavior: Behavior normal.         Thought Content: Thought content normal.           Significant Labs: All pertinent labs within the past 24 hours have been reviewed.  ABGs:   Recent Labs   Lab 05/26/22  2131   PH 7.365   PCO2 55.9*   HCO3 31.9*   POCSATURATED 98   BE 7   PO2 104*     CBC:   Recent Labs   Lab 05/26/22  1830   WBC 3.28*   HGB 11.1*   HCT 36.9*        CMP:   Recent Labs   Lab 05/26/22  1830      K 4.3      CO2 31*   GLU 81   BUN 8   CREATININE 0.7   CALCIUM 9.6   PROT 6.1   ALBUMIN 3.5   BILITOT 0.9   ALKPHOS 47*   AST 16   ALT 13   ANIONGAP 7*   EGFRNONAA >60.0       Significant Imaging: I have reviewed all pertinent  imaging results/findings within the past 24 hours.      Imaging Results              X-Ray Chest AP Portable (Final result)  Result time 05/26/22 19:13:20      Final result by Dany Mulligan MD (05/26/22 19:13:20)                   Impression:      No significant change from 04/10/2019.    No radiographic evidence of CHF.    Electronically signed by resident: Geeta Horn  Date:    05/26/2022  Time:    18:41    Electronically signed by: Dany Mulligan MD  Date:    05/26/2022  Time:    19:13               Narrative:    EXAMINATION:  XR CHEST AP PORTABLE    CLINICAL HISTORY:  CHF;.    TECHNIQUE:  Single frontal portable view of the chest was performed.    COMPARISON:  CT chest 11/08/2021 and chest x-ray 04/10/2019    FINDINGS:  Right cardiac leads project over the chest and upper abdomen.    The lungs are symmetrically expanded.  Stable right lung base linear opacity likely atelectasis.  There are changes of pulmonary emphysema.  No large consolidation, effusion or pneumothorax.    Cardiomediastinal silhouette is stable.  No free air beneath the hemidiaphragms.    Bones are intact.                                        Assessment/Plan:     COPD (chronic obstructive pulmonary disease)  SOB  Fatigue    Patient's COPD is with exacerbation noted by continued dyspnea and worsening of baseline hypoxia currently.  Patient is currently on COPD Pathway. Continue scheduled inhalers Steroids, Antibiotics and Supplemental oxygen and monitor respiratory status closely.   -2L O2 NC, wean as tolerated.   -scheduled duonebs q6h  -continue home spiriva   -prednisone 40 mg qd x5 days last day 6/1  -levaquin 750 mg qd x5 days last day 6/1 (Qtc 444)  -fall precautions      Acute respiratory failure with hypoxia and hypercarbia  Patient with Hypercapnic and Hypoxic Respiratory failure which is Acute.  he is not on home oxygen. Supplemental oxygen was provided and noted % on 2L NC  .   Signs/symptoms of respiratory failure include-  tachypnea and increased work of breathing. Contributing diagnoses includes - COPD Labs and images were reviewed. Patient Has recent ABG, which has been reviewed. Will treat underlying causes and adjust management of respiratory failure as follows  -2L O2 NC, wean as tolerated  -see COPD    Smoking greater than 40 pack years  Dangers of cigarette smoking were reviewed with patient in detail for 5 minutes and patient was encouraged to quit. Nicotine replacement options were discussed.        Gastroesophageal reflux disease with esophagitis  -protonix 40 mg qd      Benign prostatic hyperplasia without urinary obstruction  -continue home flomax qd      Hyperlipidemia  -continue home atorvastatin 40 mg qd      HTN (hypertension)  -not currently on OP antihypertensives  -will monitor BP q4h  -prn hydralazine for SBP >180        VTE Risk Mitigation (From admission, onward)         Ordered     Place sequential compression device  Until discontinued         05/26/22 2119     IP VTE LOW RISK PATIENT  Once         05/26/22 2119                   Nell Swenson PA-C  Department of Hospital Medicine   [facility] - Emergency Dept

## 2022-05-27 NOTE — ASSESSMENT & PLAN NOTE
Dangers of cigarette smoking were reviewed with patient in detail for 5 minutes and patient was encouraged to quit. Nicotine replacement options were discussed.

## 2022-05-27 NOTE — ASSESSMENT & PLAN NOTE
Patient with Hypercapnic and Hypoxic Respiratory failure which is Acute.  he is not on home oxygen. Supplemental oxygen was provided and noted % on 2L NC  .   Signs/symptoms of respiratory failure include- tachypnea and increased work of breathing. Contributing diagnoses includes - COPD Labs and images were reviewed. Patient Has recent ABG, which has been reviewed. Will treat underlying causes and adjust management of respiratory failure as follows  -2L O2 NC, wean as tolerated  -see COPD

## 2022-05-28 LAB
ANION GAP SERPL CALC-SCNC: 8 MMOL/L (ref 8–16)
ANISOCYTOSIS BLD QL SMEAR: SLIGHT
BASOPHILS # BLD AUTO: 0.01 K/UL (ref 0–0.2)
BASOPHILS NFR BLD: 0.3 % (ref 0–1.9)
BUN SERPL-MCNC: 12 MG/DL (ref 8–23)
CALCIUM SERPL-MCNC: 9.5 MG/DL (ref 8.7–10.5)
CHLORIDE SERPL-SCNC: 105 MMOL/L (ref 95–110)
CO2 SERPL-SCNC: 28 MMOL/L (ref 23–29)
CREAT SERPL-MCNC: 0.8 MG/DL (ref 0.5–1.4)
D DIMER PPP IA.FEU-MCNC: 0.54 MG/L FEU
DIFFERENTIAL METHOD: ABNORMAL
EOSINOPHIL # BLD AUTO: 0 K/UL (ref 0–0.5)
EOSINOPHIL NFR BLD: 0.6 % (ref 0–8)
ERYTHROCYTE [DISTWIDTH] IN BLOOD BY AUTOMATED COUNT: 15.1 % (ref 11.5–14.5)
EST. GFR  (AFRICAN AMERICAN): >60 ML/MIN/1.73 M^2
EST. GFR  (NON AFRICAN AMERICAN): >60 ML/MIN/1.73 M^2
GLUCOSE SERPL-MCNC: 109 MG/DL (ref 70–110)
HCT VFR BLD AUTO: 34.6 % (ref 40–54)
HGB BLD-MCNC: 10.4 G/DL (ref 14–18)
IMM GRANULOCYTES # BLD AUTO: 0.01 K/UL (ref 0–0.04)
IMM GRANULOCYTES NFR BLD AUTO: 0.3 % (ref 0–0.5)
LYMPHOCYTES # BLD AUTO: 0.6 K/UL (ref 1–4.8)
LYMPHOCYTES NFR BLD: 18 % (ref 18–48)
MAGNESIUM SERPL-MCNC: 1.9 MG/DL (ref 1.6–2.6)
MCH RBC QN AUTO: 29.4 PG (ref 27–31)
MCHC RBC AUTO-ENTMCNC: 30.1 G/DL (ref 32–36)
MCV RBC AUTO: 98 FL (ref 82–98)
MONOCYTES # BLD AUTO: 0.4 K/UL (ref 0.3–1)
MONOCYTES NFR BLD: 11.2 % (ref 4–15)
NEUTROPHILS # BLD AUTO: 2.4 K/UL (ref 1.8–7.7)
NEUTROPHILS NFR BLD: 69.6 % (ref 38–73)
NRBC BLD-RTO: 0 /100 WBC
PHOSPHATE SERPL-MCNC: 3.1 MG/DL (ref 2.7–4.5)
PLATELET # BLD AUTO: 250 K/UL (ref 150–450)
PLATELET BLD QL SMEAR: ABNORMAL
PMV BLD AUTO: 9.9 FL (ref 9.2–12.9)
POIKILOCYTOSIS BLD QL SMEAR: SLIGHT
POTASSIUM SERPL-SCNC: 4.1 MMOL/L (ref 3.5–5.1)
RBC # BLD AUTO: 3.54 M/UL (ref 4.6–6.2)
SODIUM SERPL-SCNC: 141 MMOL/L (ref 136–145)
WBC # BLD AUTO: 3.38 K/UL (ref 3.9–12.7)

## 2022-05-28 PROCEDURE — S4991 NICOTINE PATCH NONLEGEND: HCPCS | Performed by: PHYSICIAN ASSISTANT

## 2022-05-28 PROCEDURE — 63600175 PHARM REV CODE 636 W HCPCS: Performed by: PHYSICIAN ASSISTANT

## 2022-05-28 PROCEDURE — 84100 ASSAY OF PHOSPHORUS: CPT | Performed by: PHYSICIAN ASSISTANT

## 2022-05-28 PROCEDURE — 83735 ASSAY OF MAGNESIUM: CPT | Performed by: PHYSICIAN ASSISTANT

## 2022-05-28 PROCEDURE — G0378 HOSPITAL OBSERVATION PER HR: HCPCS

## 2022-05-28 PROCEDURE — 85379 FIBRIN DEGRADATION QUANT: CPT

## 2022-05-28 PROCEDURE — 94761 N-INVAS EAR/PLS OXIMETRY MLT: CPT

## 2022-05-28 PROCEDURE — A4216 STERILE WATER/SALINE, 10 ML: HCPCS | Performed by: PHYSICIAN ASSISTANT

## 2022-05-28 PROCEDURE — 36415 COLL VENOUS BLD VENIPUNCTURE: CPT

## 2022-05-28 PROCEDURE — 94640 AIRWAY INHALATION TREATMENT: CPT | Mod: XB

## 2022-05-28 PROCEDURE — 25000003 PHARM REV CODE 250: Performed by: PHYSICIAN ASSISTANT

## 2022-05-28 PROCEDURE — 27000221 HC OXYGEN, UP TO 24 HOURS

## 2022-05-28 PROCEDURE — 99226 PR SUBSEQUENT OBSERVATION CARE,LEVEL III: CPT | Mod: ,,,

## 2022-05-28 PROCEDURE — 25000242 PHARM REV CODE 250 ALT 637 W/ HCPCS: Performed by: PHYSICIAN ASSISTANT

## 2022-05-28 PROCEDURE — 25000003 PHARM REV CODE 250

## 2022-05-28 PROCEDURE — 99226 PR SUBSEQUENT OBSERVATION CARE,LEVEL III: ICD-10-PCS | Mod: ,,,

## 2022-05-28 PROCEDURE — 80048 BASIC METABOLIC PNL TOTAL CA: CPT | Performed by: PHYSICIAN ASSISTANT

## 2022-05-28 PROCEDURE — 85025 COMPLETE CBC W/AUTO DIFF WBC: CPT | Performed by: PHYSICIAN ASSISTANT

## 2022-05-28 RX ADMIN — NICOTINE 1 PATCH: 14 PATCH, EXTENDED RELEASE TRANSDERMAL at 08:05

## 2022-05-28 RX ADMIN — POLYETHYLENE GLYCOL 3350 17 G: 17 POWDER, FOR SOLUTION ORAL at 08:05

## 2022-05-28 RX ADMIN — GUAIFENESIN AND DEXTROMETHORPHAN HYDROBROMIDE 1 TABLET: 600; 30 TABLET, EXTENDED RELEASE ORAL at 08:05

## 2022-05-28 RX ADMIN — TAMSULOSIN HYDROCHLORIDE 0.4 MG: 0.4 CAPSULE ORAL at 08:05

## 2022-05-28 RX ADMIN — GUAIFENESIN AND DEXTROMETHORPHAN HYDROBROMIDE 1 TABLET: 600; 30 TABLET, EXTENDED RELEASE ORAL at 09:05

## 2022-05-28 RX ADMIN — Medication 10 ML: at 10:05

## 2022-05-28 RX ADMIN — IPRATROPIUM BROMIDE AND ALBUTEROL SULFATE 3 ML: 2.5; .5 SOLUTION RESPIRATORY (INHALATION) at 07:05

## 2022-05-28 RX ADMIN — LEVOFLOXACIN 750 MG: 250 TABLET, FILM COATED ORAL at 09:05

## 2022-05-28 RX ADMIN — Medication 10 ML: at 09:05

## 2022-05-28 RX ADMIN — Medication 10 ML: at 02:05

## 2022-05-28 RX ADMIN — Medication 10 ML: at 06:05

## 2022-05-28 RX ADMIN — PREDNISONE 40 MG: 20 TABLET ORAL at 08:05

## 2022-05-28 RX ADMIN — ATORVASTATIN CALCIUM 40 MG: 40 TABLET, FILM COATED ORAL at 08:05

## 2022-05-28 RX ADMIN — PANTOPRAZOLE SODIUM 40 MG: 40 TABLET, DELAYED RELEASE ORAL at 08:05

## 2022-05-28 RX ADMIN — IPRATROPIUM BROMIDE AND ALBUTEROL SULFATE 3 ML: 2.5; .5 SOLUTION RESPIRATORY (INHALATION) at 02:05

## 2022-05-28 RX ADMIN — TIOTROPIUM BROMIDE INHALATION SPRAY 2 PUFF: 3.12 SPRAY, METERED RESPIRATORY (INHALATION) at 07:05

## 2022-05-28 NOTE — PLAN OF CARE
Pt alert, stable; pt rested well during shift, no complaints of SOB; 2L oxygen continued; no distress noted during shift; safety maintained.    Problem: Adult Inpatient Plan of Care  Goal: Plan of Care Review  Outcome: Ongoing, Progressing  Goal: Patient-Specific Goal (Individualized)  Outcome: Ongoing, Progressing  Goal: Absence of Hospital-Acquired Illness or Injury  Outcome: Ongoing, Progressing  Goal: Optimal Comfort and Wellbeing  Outcome: Ongoing, Progressing  Goal: Readiness for Transition of Care  Outcome: Ongoing, Progressing     Problem: Adjustment to Illness COPD (Chronic Obstructive Pulmonary Disease)  Goal: Optimal Chronic Illness Coping  Outcome: Ongoing, Progressing     Problem: Functional Ability Impaired COPD (Chronic Obstructive Pulmonary Disease)  Goal: Optimal Level of Functional [place]  Outcome: Ongoing, Progressing     Problem: Infection COPD (Chronic Obstructive Pulmonary Disease)  Goal: Absence of Infection Signs and Symptoms  Outcome: Ongoing, Progressing     Problem: Oral Intake Inadequate COPD (Chronic Obstructive Pulmonary Disease)  Goal: Improved Nutrition Intake  Outcome: Ongoing, Progressing     Problem: Respiratory Compromise COPD (Chronic Obstructive Pulmonary Disease)  Goal: Effective Oxygenation and Ventilation  Outcome: Ongoing, Progressing     Problem: Infection  Goal: Absence of Infection Signs and Symptoms  Outcome: Ongoing, Progressing

## 2022-05-28 NOTE — NURSING
Home Oxygen Evaluation    Date Performed: 2022    1) Patient's Home O2 Sat on room air, while at rest: 94%        If O2 sats on room air at rest are 88% or below, patient qualifies. No additional testing needed. Document N/A in steps 2 and 3. If 89% or above, complete steps 2.      2) Patient's O2 Sat on room air while exercisin%        If O2 sats on room air while exercising remain 89% or above patient does not qualify, no further testing needed Document N/A in step 3. If O2 sats on room air while exercising are 88% or below, continue to step 3.      3) Patient's O2 Sat while exercising on O2: 94% on 2L         (Must show improvement from #2 for patients to qualify)    If O2 sats improve on oxygen, patient qualifies for portable oxygen. If not, the patient does not qualify.

## 2022-05-28 NOTE — NURSING
Performed 6 min walk test on patient.  Patient O2 dropped as low as 70% when we returned to the room. Patient appeared to be in distressed  It was hard to catch his breath.  His O2 returned to 94 % on 2L.

## 2022-05-28 NOTE — PROGRESS NOTES
"[facility] Medicine  Progress Note    Patient Name: Avni Reyes  MRN: [id number]  Patient Class: OP- Observation   Admission Date: 5/26/2022  Length of Stay: 0 days  Attending Physician: Daniel Messer MD  Primary Care Provider: ALLIE YANES JR, MD        Subjective:     Principal Problem:COPD (chronic obstructive pulmonary disease)        HPI:  Mr. Reyes is a 66 y.o. male with COPD, HTN, HLD, BPH presenting w/ c/o shortness of breath x1 week. Pt states he been HERRERA for "a long time" that usually improves w/ use of his inhaler. He reports increased usage of his inhalers, using it 2-3 times this morning alone. He saw his PCP this am for a previously established appointment where he was found to be desatting while walking and was [place] [facility] [place]  He denies any new fever. He endorses a chronic cough that has recently become productive green sputum. Pt further denies any chills, myalgias, congestion, sore throat, chest pain, n/v/d, abdominal pain, dark or tarry stools, BRBPR, decreased UOP, lower extremity edema.    In ED, Pt afebrile, tachycardic, hypertensive, SPO2 % on NC. CBC was notable for a normocytic anemia H/H 11.1/36.9. CMP showed a HCO3 31, AG 7. ABG pH 7.36, PCO2 55.9, PO2 104, HCO3 31.9. CXR revealing the lungs are symmetrically expanded. Stable right lung base linear opacity likely atelectasis. There are changes of pulmonary emphysema. No large consolidation, effusion or pneumothorax. Pt on 2L NC, s/p duonebs x2. Initiated on COPD pathway.      Overview/Hospital Course:  Patient placed in observation for COPD exacerbation. ABC reassuring. Started on scheduled duonebs, prednisone, and Levaquin. Patient requiring 2L NC to keep O2 sats >93%, will wean as tolerated. Intermittent episodes of tachycardia, d-dimer ordered due to concern of PE. D-dimer positive, VQ scan ordered. 6MWT with evidence of O2 desaturation upon ambulation.      Interval History: Patient " seen and examined today. Episodes of tachycardia overnight. D-dimer ordered and positive, VQ scan ordered. 6MWT positive for desaturation while exercising on room air. Patient reports some improvement of chest congestion after starting mucinex.     Review of Systems   Constitutional:  Positive for activity change and fatigue. Negative for chills, diaphoresis and fever.   HENT:  Negative for congestion, facial swelling, rhinorrhea and sore throat.    Eyes:  Negative for photophobia, itching and visual disturbance.   Respiratory:  Positive for cough (chronic, productive), chest tightness and shortness of breath. Negative for wheezing.    Cardiovascular:  Negative for chest pain, palpitations and leg swelling.   Gastrointestinal:  Negative for abdominal distention, abdominal pain, blood in stool, constipation, diarrhea, nausea and vomiting.   Genitourinary:  Negative for difficulty urinating, dysuria, frequency, hematuria and urgency.   Musculoskeletal:  Positive for back pain (chronic R upper back pain). Negative for arthralgias and neck stiffness.   Neurological:  Negative for dizziness, tremors, seizures, syncope, weakness, light-headedness, numbness and headaches.   Psychiatric/Behavioral:  Negative for agitation, confusion and hallucinations.    Objective:     Vital Signs (Most Recent):  Temp: 96.4 °F (35.8 °C) (05/28/22 1139)  Pulse: (!) 111 (05/28/22 1139)  Resp: 18 (05/28/22 1139)  BP: 125/80 (05/28/22 1139)  SpO2: 96 % (05/28/22 1139)   Vital Signs (24h Range):  Temp:  [96.4 °F (35.8 °C)-98.3 °F (36.8 °C)] 96.4 °F (35.8 °C)  Pulse:  [] 111  Resp:  [16-20] 18  SpO2:  [93 %-98 %] 96 %  BP: (110-141)/(66-80) 125/80     Weight: 70.8 kg (156 lb)  Body mass index is 20.58 kg/m².    Intake/Output Summary (Last 24 hours) at 5/28/2022 1407  Last data filed at 5/27/2022 2058  Gross per 24 hour   Intake 10 ml   Output --   Net 10 ml      Physical Exam  Vitals and nursing note reviewed.   Constitutional:        General: He is not in acute distress.     Appearance: He is well-developed and underweight. He is not ill-appearing or diaphoretic.      Interventions: Nasal cannula in place.   HENT:      Head: Normocephalic and atraumatic.      Right Ear: External ear normal.      Left Ear: External ear normal.      Nose: Nose normal. No congestion.      Mouth/Throat:      Pharynx: Oropharynx is clear.   Eyes:      General: No scleral icterus.     Extraocular Movements: Extraocular movements intact.   Cardiovascular:      Rate and Rhythm: Normal rate and regular rhythm.      Pulses: Normal pulses.      Heart sounds: Normal heart sounds. No murmur heard.  Pulmonary:      Effort: Pulmonary effort is normal. No respiratory distress.      Breath sounds: No decreased breath sounds, wheezing or rales.      Comments: Decreased breath sounds and air movement throughout but in the right lung fields  Abdominal:      General: Bowel sounds are normal. There is no distension.      Palpations: Abdomen is soft.      Tenderness: There is no abdominal tenderness.      Hernia: A hernia is present.   Musculoskeletal:      Cervical back: Normal range of motion.      Right lower leg: No edema.      Left lower leg: No edema.   Skin:     General: Skin is warm and dry.      Capillary Refill: Capillary refill takes less than 2 seconds.   Neurological:      General: No focal deficit present.      Mental Status: He is alert and oriented to person, place, and time. Mental status is at baseline.   Psychiatric:         Mood and Affect: Mood normal.         Behavior: Behavior normal.         Thought Content: Thought content normal.       Significant Labs: All pertinent labs within the past 24 hours have been reviewed.  CBC:   Recent Labs   Lab 05/26/22  1830 05/27/22  0248 05/28/22  0329   WBC 3.28* 3.64* 3.38*   HGB 11.1* 11.5* 10.4*   HCT 36.9* 36.7* 34.6*    266 250     CMP:   Recent Labs   Lab 05/26/22  1830 05/27/22  0248 05/28/22  0329    142  141   K 4.3 4.4 4.1    105 105   CO2 31* 30* 28   GLU 81 94 109   BUN 8 11 12   CREATININE 0.7 0.8 0.8   CALCIUM 9.6 10.2 9.5   PROT 6.1  --   --    ALBUMIN 3.5  --   --    BILITOT 0.9  --   --    ALKPHOS 47*  --   --    AST 16  --   --    ALT 13  --   --    ANIONGAP 7* 7* 8   EGFRNONAA >60.0 >60.0 >60.0       Significant Imaging: I have reviewed all pertinent imaging results/findings within the past 24 hours.      Assessment/Plan:      * COPD (chronic obstructive pulmonary disease)  SOB  Fatigue    Patient's COPD is with exacerbation noted by continued dyspnea and worsening of baseline hypoxia currently.  Patient is currently on COPD Pathway. Continue scheduled inhalers Steroids, Antibiotics and Supplemental oxygen and monitor respiratory status closely.   - 2L O2 NC, wean as tolerated.   - scheduled duonebs q6h  - continue home spiriva   - prednisone 40 mg qd x5 days last day 6/1  - levaquin 750 mg qd x5 days last day 6/1 (Qtc 444)  - patient continues to be hypoxic and tachycardiac despite management listed above, d-dimer ordered and positive  - VQ scan pending  - fall precautions      Acute respiratory failure with hypoxia and hypercarbia  Patient with Hypercapnic and Hypoxic Respiratory failure which is Acute.  he is not on home oxygen. Supplemental oxygen was provided and noted % on 2L NC  .   Signs/symptoms of respiratory failure include- tachypnea and increased work of breathing. Contributing diagnoses includes - COPD Labs and images were reviewed. Patient Has recent ABG, which has been reviewed. Will treat underlying causes and adjust management of respiratory failure as follows  -2L O2 NC, wean as tolerated  -see COPD    Gastroesophageal reflux disease with esophagitis  - protonix 40 mg qd      Benign prostatic hyperplasia without urinary obstruction  - continue home flomax qd      Smoking greater than 40 pack years  Dangers of cigarette smoking were reviewed with patient in detail for 5  minutes and patient was encouraged to quit. Nicotine replacement options were discussed.  - Patient states that he is planning to quit      Hyperlipidemia  - continue home atorvastatin 40 mg qd      HTN (hypertension)  - not currently on OP antihypertensives  - will monitor BP q4h  - prn hydralazine for SBP >180        VTE Risk Mitigation (From admission, onward)         Ordered     Place sequential compression device  Until discontinued         05/26/22 2119     IP VTE LOW RISK PATIENT  Once         05/26/22 2119                Discharge Planning   LAKEISHA: 5/28/2022     Code Status: Full Code   Is the patient medically ready for discharge?:     Reason for patient still in hospital (select all that apply): Patient trending condition, Treatment and Imaging  Discharge Plan A: Home                  Flavia Barton PA-C  Department of Hospital Medicine   [facility] - Observation

## 2022-05-28 NOTE — SUBJECTIVE & OBJECTIVE
Interval History: Patient seen and examined today. Episodes of tachycardia overnight. D-dimer ordered and positive, VQ scan ordered. 6MWT positive for desaturation while exercising on room air. Patient reports some improvement of chest congestion after starting mucinex.     Review of Systems   Constitutional:  Positive for activity change and fatigue. Negative for chills, diaphoresis and fever.   HENT:  Negative for congestion, facial swelling, rhinorrhea and sore throat.    Eyes:  Negative for photophobia, itching and visual disturbance.   Respiratory:  Positive for cough (chronic, productive), chest tightness and shortness of breath. Negative for wheezing.    Cardiovascular:  Negative for chest pain, palpitations and leg swelling.   Gastrointestinal:  Negative for abdominal distention, abdominal pain, blood in stool, constipation, diarrhea, nausea and vomiting.   Genitourinary:  Negative for difficulty urinating, dysuria, frequency, hematuria and urgency.   Musculoskeletal:  Positive for back pain (chronic R upper back pain). Negative for arthralgias and neck stiffness.   Neurological:  Negative for dizziness, tremors, seizures, syncope, weakness, light-headedness, numbness and headaches.   Psychiatric/Behavioral:  Negative for agitation, confusion and hallucinations.    Objective:     Vital Signs (Most Recent):  Temp: 96.4 °F (35.8 °C) (05/28/22 1139)  Pulse: (!) 111 (05/28/22 1139)  Resp: 18 (05/28/22 1139)  BP: 125/80 (05/28/22 1139)  SpO2: 96 % (05/28/22 1139)   Vital Signs (24h Range):  Temp:  [96.4 °F (35.8 °C)-98.3 °F (36.8 °C)] 96.4 °F (35.8 °C)  Pulse:  [] 111  Resp:  [16-20] 18  SpO2:  [93 %-98 %] 96 %  BP: (110-141)/(66-80) 125/80     Weight: 70.8 kg (156 lb)  Body mass index is 20.58 kg/m².    Intake/Output Summary (Last 24 hours) at 5/28/2022 1407  Last data filed at 5/27/2022 2058  Gross per 24 hour   Intake 10 ml   Output --   Net 10 ml      Physical Exam  Vitals and nursing note reviewed.    Constitutional:       General: He is not in acute distress.     Appearance: He is well-developed and underweight. He is not ill-appearing or diaphoretic.      Interventions: Nasal cannula in place.   HENT:      Head: Normocephalic and atraumatic.      Right Ear: External ear normal.      Left Ear: External ear normal.      Nose: Nose normal. No congestion.      Mouth/Throat:      Pharynx: Oropharynx is clear.   Eyes:      General: No scleral icterus.     Extraocular Movements: Extraocular movements intact.   Cardiovascular:      Rate and Rhythm: Normal rate and regular rhythm.      Pulses: Normal pulses.      Heart sounds: Normal heart sounds. No murmur heard.  Pulmonary:      Effort: Pulmonary effort is normal. No respiratory distress.      Breath sounds: No decreased breath sounds, wheezing or rales.      Comments: Decreased breath sounds and air movement throughout but in the right lung fields  Abdominal:      General: Bowel sounds are normal. There is no distension.      Palpations: Abdomen is soft.      Tenderness: There is no abdominal tenderness.      Hernia: A hernia is present.   Musculoskeletal:      Cervical back: Normal range of motion.      Right lower leg: No edema.      Left lower leg: No edema.   Skin:     General: Skin is warm and dry.      Capillary Refill: Capillary refill takes less than 2 seconds.   Neurological:      General: No focal deficit present.      Mental Status: He is alert and oriented to person, place, and time. Mental status is at baseline.   Psychiatric:         Mood and Affect: Mood normal.         Behavior: Behavior normal.         Thought Content: Thought content normal.       Significant Labs: All pertinent labs within the past 24 hours have been reviewed.  CBC:   Recent Labs   Lab 05/26/22  1830 05/27/22  0248 05/28/22  0329   WBC 3.28* 3.64* 3.38*   HGB 11.1* 11.5* 10.4*   HCT 36.9* 36.7* 34.6*    266 250     CMP:   Recent Labs   Lab 05/26/22  1830 05/27/22  0248  05/28/22  0329    142 141   K 4.3 4.4 4.1    105 105   CO2 31* 30* 28   GLU 81 94 109   BUN 8 11 12   CREATININE 0.7 0.8 0.8   CALCIUM 9.6 10.2 9.5   PROT 6.1  --   --    ALBUMIN 3.5  --   --    BILITOT 0.9  --   --    ALKPHOS 47*  --   --    AST 16  --   --    ALT 13  --   --    ANIONGAP 7* 7* 8   EGFRNONAA >60.0 >60.0 >60.0       Significant Imaging: I have reviewed all pertinent imaging results/findings within the past 24 hours.

## 2022-05-28 NOTE — ASSESSMENT & PLAN NOTE
SOB  Fatigue    Patient's COPD is with exacerbation noted by continued dyspnea and worsening of baseline hypoxia currently.  Patient is currently on COPD Pathway. Continue scheduled inhalers Steroids, Antibiotics and Supplemental oxygen and monitor respiratory status closely.   - 2L O2 NC, wean as tolerated.   - scheduled duonebs q6h  - continue home spiriva   - prednisone 40 mg qd x5 days last day 6/1  - levaquin 750 mg qd x5 days last day 6/1 (Qtc 444)  - patient continues to be hypoxic and tachycardiac despite management listed above, d-dimer ordered and positive  - VQ scan pending  - fall precautions

## 2022-05-29 LAB
ANION GAP SERPL CALC-SCNC: 7 MMOL/L (ref 8–16)
BASOPHILS # BLD AUTO: 0.01 K/UL (ref 0–0.2)
BASOPHILS NFR BLD: 0.2 % (ref 0–1.9)
BUN SERPL-MCNC: 13 MG/DL (ref 8–23)
CALCIUM SERPL-MCNC: 9.6 MG/DL (ref 8.7–10.5)
CHLORIDE SERPL-SCNC: 104 MMOL/L (ref 95–110)
CO2 SERPL-SCNC: 30 MMOL/L (ref 23–29)
CREAT SERPL-MCNC: 0.7 MG/DL (ref 0.5–1.4)
DIFFERENTIAL METHOD: ABNORMAL
EOSINOPHIL # BLD AUTO: 0 K/UL (ref 0–0.5)
EOSINOPHIL NFR BLD: 0.2 % (ref 0–8)
ERYTHROCYTE [DISTWIDTH] IN BLOOD BY AUTOMATED COUNT: 15.1 % (ref 11.5–14.5)
EST. GFR  (AFRICAN AMERICAN): >60 ML/MIN/1.73 M^2
EST. GFR  (NON AFRICAN AMERICAN): >60 ML/MIN/1.73 M^2
GLUCOSE SERPL-MCNC: 113 MG/DL (ref 70–110)
HCT VFR BLD AUTO: 34.6 % (ref 40–54)
HGB BLD-MCNC: 10.5 G/DL (ref 14–18)
IMM GRANULOCYTES # BLD AUTO: 0.01 K/UL (ref 0–0.04)
IMM GRANULOCYTES NFR BLD AUTO: 0.2 % (ref 0–0.5)
LYMPHOCYTES # BLD AUTO: 0.8 K/UL (ref 1–4.8)
LYMPHOCYTES NFR BLD: 19.7 % (ref 18–48)
MAGNESIUM SERPL-MCNC: 1.9 MG/DL (ref 1.6–2.6)
MCH RBC QN AUTO: 29.4 PG (ref 27–31)
MCHC RBC AUTO-ENTMCNC: 30.3 G/DL (ref 32–36)
MCV RBC AUTO: 97 FL (ref 82–98)
MONOCYTES # BLD AUTO: 0.5 K/UL (ref 0.3–1)
MONOCYTES NFR BLD: 12.2 % (ref 4–15)
NEUTROPHILS # BLD AUTO: 2.7 K/UL (ref 1.8–7.7)
NEUTROPHILS NFR BLD: 67.5 % (ref 38–73)
NRBC BLD-RTO: 0 /100 WBC
PHOSPHATE SERPL-MCNC: 3.5 MG/DL (ref 2.7–4.5)
PLATELET # BLD AUTO: 253 K/UL (ref 150–450)
PMV BLD AUTO: 9.7 FL (ref 9.2–12.9)
POTASSIUM SERPL-SCNC: 4.1 MMOL/L (ref 3.5–5.1)
RBC # BLD AUTO: 3.57 M/UL (ref 4.6–6.2)
SODIUM SERPL-SCNC: 141 MMOL/L (ref 136–145)
WBC # BLD AUTO: 4.02 K/UL (ref 3.9–12.7)

## 2022-05-29 PROCEDURE — 94640 AIRWAY INHALATION TREATMENT: CPT

## 2022-05-29 PROCEDURE — 25000003 PHARM REV CODE 250

## 2022-05-29 PROCEDURE — 99226 PR SUBSEQUENT OBSERVATION CARE,LEVEL III: ICD-10-PCS | Mod: ,,,

## 2022-05-29 PROCEDURE — G0378 HOSPITAL OBSERVATION PER HR: HCPCS

## 2022-05-29 PROCEDURE — 27000221 HC OXYGEN, UP TO 24 HOURS

## 2022-05-29 PROCEDURE — 84100 ASSAY OF PHOSPHORUS: CPT | Performed by: PHYSICIAN ASSISTANT

## 2022-05-29 PROCEDURE — 25000242 PHARM REV CODE 250 ALT 637 W/ HCPCS: Performed by: PHYSICIAN ASSISTANT

## 2022-05-29 PROCEDURE — 94640 AIRWAY INHALATION TREATMENT: CPT | Mod: XB

## 2022-05-29 PROCEDURE — 99226 PR SUBSEQUENT OBSERVATION CARE,LEVEL III: CPT | Mod: ,,,

## 2022-05-29 PROCEDURE — A4216 STERILE WATER/SALINE, 10 ML: HCPCS | Performed by: PHYSICIAN ASSISTANT

## 2022-05-29 PROCEDURE — 80048 BASIC METABOLIC PNL TOTAL CA: CPT | Performed by: PHYSICIAN ASSISTANT

## 2022-05-29 PROCEDURE — 83735 ASSAY OF MAGNESIUM: CPT | Performed by: PHYSICIAN ASSISTANT

## 2022-05-29 PROCEDURE — 36415 COLL VENOUS BLD VENIPUNCTURE: CPT | Performed by: PHYSICIAN ASSISTANT

## 2022-05-29 PROCEDURE — 25000003 PHARM REV CODE 250: Performed by: PHYSICIAN ASSISTANT

## 2022-05-29 PROCEDURE — S4991 NICOTINE PATCH NONLEGEND: HCPCS | Performed by: PHYSICIAN ASSISTANT

## 2022-05-29 PROCEDURE — 94761 N-INVAS EAR/PLS OXIMETRY MLT: CPT

## 2022-05-29 PROCEDURE — 85025 COMPLETE CBC W/AUTO DIFF WBC: CPT | Performed by: PHYSICIAN ASSISTANT

## 2022-05-29 PROCEDURE — 63600175 PHARM REV CODE 636 W HCPCS: Performed by: PHYSICIAN ASSISTANT

## 2022-05-29 PROCEDURE — 99900035 HC TECH TIME PER 15 MIN (STAT)

## 2022-05-29 RX ORDER — LIDOCAINE 50 MG/G
1 PATCH TOPICAL
Status: DISCONTINUED | OUTPATIENT
Start: 2022-05-29 | End: 2022-05-30 | Stop reason: HOSPADM

## 2022-05-29 RX ADMIN — POLYETHYLENE GLYCOL 3350 17 G: 17 POWDER, FOR SOLUTION ORAL at 08:05

## 2022-05-29 RX ADMIN — TIOTROPIUM BROMIDE INHALATION SPRAY 2 PUFF: 3.12 SPRAY, METERED RESPIRATORY (INHALATION) at 09:05

## 2022-05-29 RX ADMIN — Medication 10 ML: at 02:05

## 2022-05-29 RX ADMIN — PANTOPRAZOLE SODIUM 40 MG: 40 TABLET, DELAYED RELEASE ORAL at 08:05

## 2022-05-29 RX ADMIN — IPRATROPIUM BROMIDE AND ALBUTEROL SULFATE 3 ML: 2.5; .5 SOLUTION RESPIRATORY (INHALATION) at 08:05

## 2022-05-29 RX ADMIN — IPRATROPIUM BROMIDE AND ALBUTEROL SULFATE 3 ML: 2.5; .5 SOLUTION RESPIRATORY (INHALATION) at 09:05

## 2022-05-29 RX ADMIN — Medication 10 ML: at 06:05

## 2022-05-29 RX ADMIN — GUAIFENESIN AND DEXTROMETHORPHAN HYDROBROMIDE 1 TABLET: 600; 30 TABLET, EXTENDED RELEASE ORAL at 08:05

## 2022-05-29 RX ADMIN — TAMSULOSIN HYDROCHLORIDE 0.4 MG: 0.4 CAPSULE ORAL at 08:05

## 2022-05-29 RX ADMIN — LEVOFLOXACIN 750 MG: 250 TABLET, FILM COATED ORAL at 09:05

## 2022-05-29 RX ADMIN — LIDOCAINE 1 PATCH: 50 PATCH CUTANEOUS at 01:05

## 2022-05-29 RX ADMIN — GUAIFENESIN AND DEXTROMETHORPHAN HYDROBROMIDE 1 TABLET: 600; 30 TABLET, EXTENDED RELEASE ORAL at 09:05

## 2022-05-29 RX ADMIN — IPRATROPIUM BROMIDE AND ALBUTEROL SULFATE 3 ML: 2.5; .5 SOLUTION RESPIRATORY (INHALATION) at 02:05

## 2022-05-29 RX ADMIN — ATORVASTATIN CALCIUM 40 MG: 40 TABLET, FILM COATED ORAL at 08:05

## 2022-05-29 RX ADMIN — Medication 10 ML: at 09:05

## 2022-05-29 RX ADMIN — NICOTINE 1 PATCH: 14 PATCH, EXTENDED RELEASE TRANSDERMAL at 08:05

## 2022-05-29 RX ADMIN — PREDNISONE 40 MG: 20 TABLET ORAL at 08:05

## 2022-05-29 NOTE — PROGRESS NOTES
"[facility] Medicine  Progress Note    Patient Name: Avni Reyes  MRN: [id number]  Patient Class: OP- Observation   Admission Date: 5/26/2022  Length of Stay: 0 days  Attending Physician: Daniel Messer MD  Primary Care Provider: ALLIE YANES JR, MD        Subjective:     Principal Problem:COPD (chronic obstructive pulmonary disease)        HPI:  Mr. Reyes is a 66 y.o. male with COPD, HTN, HLD, BPH presenting w/ c/o shortness of breath x1 week. Pt states he been HERRERA for "a long time" that usually improves w/ use of his inhaler. He reports increased usage of his inhalers, using it 2-3 times this morning alone. He saw his PCP this am for a previously established appointment where he was found to be desatting while walking and was subsequently sent to [facility] [place] ED.  He denies any new fever. He endorses a chronic cough that has recently become productive green sputum. Pt further denies any chills, myalgias, congestion, sore throat, chest pain, n/v/d, abdominal pain, dark or tarry stools, BRBPR, decreased UOP, lower extremity edema.    In ED, Pt afebrile, tachycardic, hypertensive, SPO2 % on NC. CBC was notable for a normocytic anemia H/H 11.1/36.9. CMP showed a HCO3 31, AG 7. ABG pH 7.36, PCO2 55.9, PO2 104, HCO3 31.9. CXR revealing the lungs are symmetrically expanded. Stable right lung base linear opacity likely atelectasis. There are changes of pulmonary emphysema. No large consolidation, effusion or pneumothorax. Pt on 2L NC, s/p duonebs x2. Initiated on COPD pathway.      Overview/Hospital Course:  Patient placed in observation for COPD exacerbation. ABG reassuring. Started on scheduled duonebs, prednisone, and Levaquin. Patient requiring 2L NC to keep O2 sats >93%, will wean as tolerated. Intermittent episodes of tachycardia, d-dimer ordered due to concern of PE. D-dimer positive, VQ scan ordered. 6MWT with evidence of O2 desaturation upon ambulation. VQ scan with low probability for " PE, will order CT chest without contrast.      Interval History: Patient seen and examined today. VSSAF, NAEON. No episodes of tachycardia overnight but still requiring 2L NC. VQ scan with low probability for PE, will order CT chest without contrast to further assess. Patient reports improvements some improvement in congestion. Patient complaining of right scapular pain, will order lidocaine patch.    Review of Systems   Constitutional:  Positive for activity change and fatigue. Negative for chills, diaphoresis and fever.   HENT:  Negative for congestion, facial swelling, rhinorrhea and sore throat.    Eyes:  Negative for photophobia, itching and visual disturbance.   Respiratory:  Positive for cough (chronic, productive), chest tightness and shortness of breath. Negative for wheezing.    Cardiovascular:  Negative for chest pain, palpitations and leg swelling.   Gastrointestinal:  Negative for abdominal distention, abdominal pain, blood in stool, constipation, diarrhea, nausea and vomiting.   Genitourinary:  Negative for difficulty urinating, dysuria, frequency, hematuria and urgency.   Musculoskeletal:  Positive for back pain (chronic R upper back pain). Negative for arthralgias and neck stiffness.   Neurological:  Negative for dizziness, tremors, seizures, syncope, weakness, light-headedness, numbness and headaches.   Psychiatric/Behavioral:  Negative for agitation, confusion and hallucinations.    Objective:     Vital Signs (Most Recent):  Temp: 96.7 °F (35.9 °C) (05/29/22 0759)  Pulse: 94 (05/29/22 0915)  Resp: 19 (05/29/22 0915)  BP: 133/72 (05/29/22 0759)  SpO2: 95 % (05/29/22 0915) Vital Signs (24h Range):  Temp:  [96.6 °F (35.9 °C)-97.8 °F (36.6 °C)] 96.7 °F (35.9 °C)  Pulse:  [] 94  Resp:  [16-20] 19  SpO2:  [92 %-100 %] 95 %  BP: (123-135)/(70-77) 133/72     Weight: 70.8 kg (156 lb)  Body mass index is 20.58 kg/m².    Intake/Output Summary (Last 24 hours) at 5/29/2022 1147  Last data filed at  5/29/2022 0316  Gross per 24 hour   Intake 472 ml   Output 400 ml   Net 72 ml      Physical Exam  Vitals and nursing note reviewed.   Constitutional:       General: He is not in acute distress.     Appearance: He is well-developed and underweight. He is not ill-appearing or diaphoretic.      Interventions: Nasal cannula in place.   HENT:      Head: Normocephalic and atraumatic.      Right Ear: External ear normal.      Left Ear: External ear normal.      Nose: Nose normal. No congestion.      Mouth/Throat:      Pharynx: Oropharynx is clear.   Eyes:      General: No scleral icterus.     Extraocular Movements: Extraocular movements intact.   Cardiovascular:      Rate and Rhythm: Normal rate and regular rhythm.      Pulses: Normal pulses.      Heart sounds: Normal heart sounds. No murmur heard.  Pulmonary:      Effort: Pulmonary effort is normal. No respiratory distress.      Breath sounds: No decreased breath sounds, wheezing or rales.      Comments: Decreased breath sounds and air movement throughout  Abdominal:      General: Bowel sounds are normal. There is no distension.      Palpations: Abdomen is soft.      Tenderness: There is no abdominal tenderness.      Hernia: A hernia is present.   Musculoskeletal:      Cervical back: Normal range of motion.      Right lower leg: No edema.      Left lower leg: No edema.   Skin:     General: Skin is warm and dry.      Capillary Refill: Capillary refill takes less than 2 seconds.   Neurological:      General: No focal deficit present.      Mental Status: He is alert and oriented to person, place, and time. Mental status is at baseline.   Psychiatric:         Mood and Affect: Mood normal.         Behavior: Behavior normal.         Thought Content: Thought content normal.       Significant Labs: All pertinent labs within the past 24 hours have been reviewed.  CBC:   Recent Labs   Lab 05/28/22  0329 05/29/22  0509   WBC 3.38* 4.02   HGB 10.4* 10.5*   HCT 34.6* 34.6*     253     CMP:   Recent Labs   Lab 05/28/22  0329 05/29/22  0509    141   K 4.1 4.1    104   CO2 28 30*    113*   BUN 12 13   CREATININE 0.8 0.7   CALCIUM 9.5 9.6   ANIONGAP 8 7*   EGFRNONAA >60.0 >60.0       Significant Imaging: I have reviewed all pertinent imaging results/findings within the past 24 hours.      Assessment/Plan:      * COPD (chronic obstructive pulmonary disease)  SOB  Fatigue    Patient's COPD is with exacerbation noted by continued dyspnea and worsening of baseline hypoxia currently.  Patient is currently on COPD Pathway. Continue scheduled inhalers Steroids, Antibiotics and Supplemental oxygen and monitor respiratory status closely.   - 2L O2 NC, wean as tolerated.   - scheduled duonebs q6h  - continue home spiriva   - prednisone 40 mg qd x5 days last day 6/1  - levaquin 750 mg qd x5 days last day 6/1 (Qtc 444)  - patient continues to be hypoxic and tachycardiac despite management listed above, d-dimer ordered and positive  - VQ scan pending >> low probability for PE  - CT chest without contrast ordered  - fall precautions      Acute respiratory failure with hypoxia and hypercarbia  Patient with Hypercapnic and Hypoxic Respiratory failure which is Acute.  he is not on home oxygen. Supplemental oxygen was provided and noted % on 2L NC  .   Signs/symptoms of respiratory failure include- tachypnea and increased work of breathing. Contributing diagnoses includes - COPD Labs and images were reviewed. Patient Has recent ABG, which has been reviewed. Will treat underlying causes and adjust management of respiratory failure as follows  -2L O2 NC, wean as tolerated  -see COPD    Gastroesophageal reflux disease with esophagitis  - protonix 40 mg qd      Benign prostatic hyperplasia without urinary obstruction  - continue home flomax qd      Smoking greater than 40 pack years  Dangers of cigarette smoking were reviewed with patient in detail for 5 minutes and patient was encouraged  to quit. Nicotine replacement options were discussed.  - Patient states that he is planning to quit      Hyperlipidemia  - continue home atorvastatin 40 mg qd      HTN (hypertension)  - not currently on OP antihypertensives  - will monitor BP q4h  - prn hydralazine for SBP >180        VTE Risk Mitigation (From admission, onward)         Ordered     Place sequential compression device  Until discontinued         05/26/22 2119     IP VTE LOW RISK PATIENT  Once         05/26/22 2119                Discharge Planning   LAKEISHA: 5/28/2022     Code Status: Full Code   Is the patient medically ready for discharge?:     Reason for patient still in hospital (select all that apply): Patient trending condition, Treatment and Imaging  Discharge Plan A: Home                  Flavia Barton PA-C  Department of Hospital Medicine   [facility] - Observation

## 2022-05-29 NOTE — NURSING
Patient is awake alert and oriented X4.  He is short of breath and has labored breathing with activity.  Heart rate has been high as 166 after patient ambulated to restroom without O2.  Patient now has continuous pulse ox.

## 2022-05-29 NOTE — SUBJECTIVE & OBJECTIVE
Interval History: Patient seen and examined today. KEMAL DELVALLE. No episodes of tachycardia overnight but still requiring 2L NC. VQ scan with low probability for PE, will order CT chest without contrast to further assess. Patient reports improvements some improvement in congestion. Patient complaining of right scapular pain, will order lidocaine patch.    Review of Systems   Constitutional:  Positive for activity change and fatigue. Negative for chills, diaphoresis and fever.   HENT:  Negative for congestion, facial swelling, rhinorrhea and sore throat.    Eyes:  Negative for photophobia, itching and visual disturbance.   Respiratory:  Positive for cough (chronic, productive), chest tightness and shortness of breath. Negative for wheezing.    Cardiovascular:  Negative for chest pain, palpitations and leg swelling.   Gastrointestinal:  Negative for abdominal distention, abdominal pain, blood in stool, constipation, diarrhea, nausea and vomiting.   Genitourinary:  Negative for difficulty urinating, dysuria, frequency, hematuria and urgency.   Musculoskeletal:  Positive for back pain (chronic R upper back pain). Negative for arthralgias and neck stiffness.   Neurological:  Negative for dizziness, tremors, seizures, syncope, weakness, light-headedness, numbness and headaches.   Psychiatric/Behavioral:  Negative for agitation, confusion and hallucinations.    Objective:     Vital Signs (Most Recent):  Temp: 96.7 °F (35.9 °C) (05/29/22 0759)  Pulse: 94 (05/29/22 0915)  Resp: 19 (05/29/22 0915)  BP: 133/72 (05/29/22 0759)  SpO2: 95 % (05/29/22 0915) Vital Signs (24h Range):  Temp:  [96.6 °F (35.9 °C)-97.8 °F (36.6 °C)] 96.7 °F (35.9 °C)  Pulse:  [] 94  Resp:  [16-20] 19  SpO2:  [92 %-100 %] 95 %  BP: (123-135)/(70-77) 133/72     Weight: 70.8 kg (156 lb)  Body mass index is 20.58 kg/m².    Intake/Output Summary (Last 24 hours) at 5/29/2022 1143  Last data filed at 5/29/2022 0316  Gross per 24 hour   Intake 472 ml    Output 400 ml   Net 72 ml      Physical Exam  Vitals and nursing note reviewed.   Constitutional:       General: He is not in acute distress.     Appearance: He is well-developed and underweight. He is not ill-appearing or diaphoretic.      Interventions: Nasal cannula in place.   HENT:      Head: Normocephalic and atraumatic.      Right Ear: External ear normal.      Left Ear: External ear normal.      Nose: Nose normal. No congestion.      Mouth/Throat:      Pharynx: Oropharynx is clear.   Eyes:      General: No scleral icterus.     Extraocular Movements: Extraocular movements intact.   Cardiovascular:      Rate and Rhythm: Normal rate and regular rhythm.      Pulses: Normal pulses.      Heart sounds: Normal heart sounds. No murmur heard.  Pulmonary:      Effort: Pulmonary effort is normal. No respiratory distress.      Breath sounds: No decreased breath sounds, wheezing or rales.      Comments: Decreased breath sounds and air movement throughout  Abdominal:      General: Bowel sounds are normal. There is no distension.      Palpations: Abdomen is soft.      Tenderness: There is no abdominal tenderness.      Hernia: A hernia is present.   Musculoskeletal:      Cervical back: Normal range of motion.      Right lower leg: No edema.      Left lower leg: No edema.   Skin:     General: Skin is warm and dry.      Capillary Refill: Capillary refill takes less than 2 seconds.   Neurological:      General: No focal deficit present.      Mental Status: He is alert and oriented to person, place, and time. Mental status is at baseline.   Psychiatric:         Mood and Affect: Mood normal.         Behavior: Behavior normal.         Thought Content: Thought content normal.       Significant Labs: All pertinent labs within the past 24 hours have been reviewed.  CBC:   Recent Labs   Lab 05/28/22  0329 05/29/22  0509   WBC 3.38* 4.02   HGB 10.4* 10.5*   HCT 34.6* 34.6*    253     CMP:   Recent Labs   Lab 05/28/22  6999  05/29/22  0509    141   K 4.1 4.1    104   CO2 28 30*    113*   BUN 12 13   CREATININE 0.8 0.7   CALCIUM 9.5 9.6   ANIONGAP 8 7*   EGFRNONAA >60.0 >60.0       Significant Imaging: I have reviewed all pertinent imaging results/findings within the past 24 hours.

## 2022-05-29 NOTE — ASSESSMENT & PLAN NOTE
SOB  Fatigue    Patient's COPD is with exacerbation noted by continued dyspnea and worsening of baseline hypoxia currently.  Patient is currently on COPD Pathway. Continue scheduled inhalers Steroids, Antibiotics and Supplemental oxygen and monitor respiratory status closely.   - 2L O2 NC, wean as tolerated.   - scheduled duonebs q6h  - continue home spiriva   - prednisone 40 mg qd x5 days last day 6/1  - levaquin 750 mg qd x5 days last day 6/1 (Qtc 444)  - patient continues to be hypoxic and tachycardiac despite management listed above, d-dimer ordered and positive  - VQ scan pending >> low probability for PE  - CT chest without contrast ordered  - fall precautions

## 2022-05-29 NOTE — PLAN OF CARE
Pt alert, stable; scheduled meds given without difficulty; pt still getting 2L O2 via nasal cannula; no distress noted during shift safety maintained.    Problem: Adult Inpatient Plan of Care  Goal: Plan of Care Review  Outcome: Ongoing, Progressing  Goal: Patient-Specific Goal (Individualized)  Outcome: Ongoing, Progressing  Goal: Absence of Hospital-Acquired Illness or Injury  Outcome: Ongoing, Progressing  Goal: Optimal Comfort and Wellbeing  Outcome: Ongoing, Progressing  Goal: Readiness for Transition of Care  Outcome: Ongoing, Progressing     Problem: Adjustment to Illness COPD (Chronic Obstructive Pulmonary Disease)  Goal: Optimal Chronic Illness Coping  Outcome: Ongoing, Progressing     Problem: Functional Ability Impaired COPD (Chronic Obstructive Pulmonary Disease)  Goal: Optimal Level of Functional [place]  Outcome: Ongoing, Progressing     Problem: Infection COPD (Chronic Obstructive Pulmonary Disease)  Goal: Absence of Infection Signs and Symptoms  Outcome: Ongoing, Progressing     Problem: Oral Intake Inadequate COPD (Chronic Obstructive Pulmonary Disease)  Goal: Improved Nutrition Intake  Outcome: Ongoing, Progressing     Problem: Respiratory Compromise COPD (Chronic Obstructive Pulmonary Disease)  Goal: Effective Oxygenation and Ventilation  Outcome: Ongoing, Progressing     Problem: Infection  Goal: Absence of Infection Signs and Symptoms  Outcome: Ongoing, Progressing

## 2022-05-30 VITALS
TEMPERATURE: 97 F | BODY MASS INDEX: 20.67 KG/M2 | OXYGEN SATURATION: 90 % | WEIGHT: 156 LBS | HEART RATE: 118 BPM | RESPIRATION RATE: 18 BRPM | HEIGHT: 73 IN | DIASTOLIC BLOOD PRESSURE: 69 MMHG | SYSTOLIC BLOOD PRESSURE: 122 MMHG

## 2022-05-30 LAB
ANION GAP SERPL CALC-SCNC: 8 MMOL/L (ref 8–16)
BASOPHILS # BLD AUTO: 0.02 K/UL (ref 0–0.2)
BASOPHILS NFR BLD: 0.6 % (ref 0–1.9)
BUN SERPL-MCNC: 14 MG/DL (ref 8–23)
CALCIUM SERPL-MCNC: 9.4 MG/DL (ref 8.7–10.5)
CHLORIDE SERPL-SCNC: 101 MMOL/L (ref 95–110)
CO2 SERPL-SCNC: 29 MMOL/L (ref 23–29)
CREAT SERPL-MCNC: 0.7 MG/DL (ref 0.5–1.4)
DIFFERENTIAL METHOD: ABNORMAL
EOSINOPHIL # BLD AUTO: 0 K/UL (ref 0–0.5)
EOSINOPHIL NFR BLD: 0.3 % (ref 0–8)
ERYTHROCYTE [DISTWIDTH] IN BLOOD BY AUTOMATED COUNT: 15 % (ref 11.5–14.5)
EST. GFR  (AFRICAN AMERICAN): >60 ML/MIN/1.73 M^2
EST. GFR  (NON AFRICAN AMERICAN): >60 ML/MIN/1.73 M^2
GLUCOSE SERPL-MCNC: 76 MG/DL (ref 70–110)
HCT VFR BLD AUTO: 33.5 % (ref 40–54)
HGB BLD-MCNC: 10.4 G/DL (ref 14–18)
IMM GRANULOCYTES # BLD AUTO: 0.01 K/UL (ref 0–0.04)
IMM GRANULOCYTES NFR BLD AUTO: 0.3 % (ref 0–0.5)
LYMPHOCYTES # BLD AUTO: 0.9 K/UL (ref 1–4.8)
LYMPHOCYTES NFR BLD: 23.8 % (ref 18–48)
MAGNESIUM SERPL-MCNC: 2 MG/DL (ref 1.6–2.6)
MCH RBC QN AUTO: 29 PG (ref 27–31)
MCHC RBC AUTO-ENTMCNC: 31 G/DL (ref 32–36)
MCV RBC AUTO: 93 FL (ref 82–98)
MONOCYTES # BLD AUTO: 0.5 K/UL (ref 0.3–1)
MONOCYTES NFR BLD: 13.6 % (ref 4–15)
NEUTROPHILS # BLD AUTO: 2.2 K/UL (ref 1.8–7.7)
NEUTROPHILS NFR BLD: 61.4 % (ref 38–73)
NRBC BLD-RTO: 0 /100 WBC
PHOSPHATE SERPL-MCNC: 3.7 MG/DL (ref 2.7–4.5)
PLATELET # BLD AUTO: 234 K/UL (ref 150–450)
PMV BLD AUTO: 10.3 FL (ref 9.2–12.9)
POTASSIUM SERPL-SCNC: 4.2 MMOL/L (ref 3.5–5.1)
RBC # BLD AUTO: 3.59 M/UL (ref 4.6–6.2)
SODIUM SERPL-SCNC: 138 MMOL/L (ref 136–145)
WBC # BLD AUTO: 3.61 K/UL (ref 3.9–12.7)

## 2022-05-30 PROCEDURE — 36415 COLL VENOUS BLD VENIPUNCTURE: CPT | Performed by: PHYSICIAN ASSISTANT

## 2022-05-30 PROCEDURE — 25000003 PHARM REV CODE 250

## 2022-05-30 PROCEDURE — 94640 AIRWAY INHALATION TREATMENT: CPT

## 2022-05-30 PROCEDURE — 99239 PR HOSPITAL DISCHARGE DAY,>30 MIN: ICD-10-PCS | Mod: ,,,

## 2022-05-30 PROCEDURE — 63600175 PHARM REV CODE 636 W HCPCS: Performed by: PHYSICIAN ASSISTANT

## 2022-05-30 PROCEDURE — 99900035 HC TECH TIME PER 15 MIN (STAT)

## 2022-05-30 PROCEDURE — 83735 ASSAY OF MAGNESIUM: CPT | Performed by: PHYSICIAN ASSISTANT

## 2022-05-30 PROCEDURE — 25000242 PHARM REV CODE 250 ALT 637 W/ HCPCS: Performed by: PHYSICIAN ASSISTANT

## 2022-05-30 PROCEDURE — 11000001 HC ACUTE MED/SURG PRIVATE ROOM

## 2022-05-30 PROCEDURE — 84100 ASSAY OF PHOSPHORUS: CPT | Performed by: PHYSICIAN ASSISTANT

## 2022-05-30 PROCEDURE — 99239 HOSP IP/OBS DSCHRG MGMT >30: CPT | Mod: ,,,

## 2022-05-30 PROCEDURE — 94640 AIRWAY INHALATION TREATMENT: CPT | Mod: XB

## 2022-05-30 PROCEDURE — 94761 N-INVAS EAR/PLS OXIMETRY MLT: CPT

## 2022-05-30 PROCEDURE — 85025 COMPLETE CBC W/AUTO DIFF WBC: CPT | Performed by: PHYSICIAN ASSISTANT

## 2022-05-30 PROCEDURE — A4216 STERILE WATER/SALINE, 10 ML: HCPCS | Performed by: PHYSICIAN ASSISTANT

## 2022-05-30 PROCEDURE — 80048 BASIC METABOLIC PNL TOTAL CA: CPT | Performed by: PHYSICIAN ASSISTANT

## 2022-05-30 PROCEDURE — 25000003 PHARM REV CODE 250: Performed by: PHYSICIAN ASSISTANT

## 2022-05-30 PROCEDURE — 27000221 HC OXYGEN, UP TO 24 HOURS

## 2022-05-30 PROCEDURE — S4991 NICOTINE PATCH NONLEGEND: HCPCS | Performed by: PHYSICIAN ASSISTANT

## 2022-05-30 RX ORDER — PREDNISONE 20 MG/1
40 TABLET ORAL DAILY
Qty: 2 TABLET | Refills: 0 | Status: SHIPPED | OUTPATIENT
Start: 2022-05-31 | End: 2022-06-01

## 2022-05-30 RX ORDER — ALBUTEROL SULFATE 90 UG/1
2 AEROSOL, METERED RESPIRATORY (INHALATION) EVERY 6 HOURS PRN
Qty: 6.7 G | Refills: 6 | Status: SHIPPED | OUTPATIENT
Start: 2022-05-30 | End: 2022-12-10

## 2022-05-30 RX ORDER — LEVOFLOXACIN 750 MG/1
750 TABLET ORAL NIGHTLY
Qty: 2 TABLET | Refills: 0 | Status: SHIPPED | OUTPATIENT
Start: 2022-05-30 | End: 2022-06-01

## 2022-05-30 RX ORDER — IPRATROPIUM BROMIDE AND ALBUTEROL SULFATE 2.5; .5 MG/3ML; MG/3ML
3 SOLUTION RESPIRATORY (INHALATION)
Qty: 90 ML | Refills: 0 | Status: SHIPPED | OUTPATIENT
Start: 2022-05-30 | End: 2023-01-31 | Stop reason: SDUPTHER

## 2022-05-30 RX ADMIN — TAMSULOSIN HYDROCHLORIDE 0.4 MG: 0.4 CAPSULE ORAL at 08:05

## 2022-05-30 RX ADMIN — NICOTINE 1 PATCH: 14 PATCH, EXTENDED RELEASE TRANSDERMAL at 08:05

## 2022-05-30 RX ADMIN — PREDNISONE 40 MG: 20 TABLET ORAL at 08:05

## 2022-05-30 RX ADMIN — GUAIFENESIN AND DEXTROMETHORPHAN HYDROBROMIDE 1 TABLET: 600; 30 TABLET, EXTENDED RELEASE ORAL at 08:05

## 2022-05-30 RX ADMIN — Medication 10 ML: at 06:05

## 2022-05-30 RX ADMIN — POLYETHYLENE GLYCOL 3350 17 G: 17 POWDER, FOR SOLUTION ORAL at 08:05

## 2022-05-30 RX ADMIN — ATORVASTATIN CALCIUM 40 MG: 40 TABLET, FILM COATED ORAL at 08:05

## 2022-05-30 RX ADMIN — PANTOPRAZOLE SODIUM 40 MG: 40 TABLET, DELAYED RELEASE ORAL at 08:05

## 2022-05-30 RX ADMIN — TIOTROPIUM BROMIDE INHALATION SPRAY 2 PUFF: 3.12 SPRAY, METERED RESPIRATORY (INHALATION) at 08:05

## 2022-05-30 RX ADMIN — IPRATROPIUM BROMIDE AND ALBUTEROL SULFATE 3 ML: 2.5; .5 SOLUTION RESPIRATORY (INHALATION) at 08:05

## 2022-05-30 NOTE — PLAN OF CARE
[facility]  Discharge Final Note    Primary Care Provider: ALLIE YANES JR, MD    Expected Discharge Date: 5/30/2022     Pt discharged home with no services.      Michel Parra, RN,BSN        Final Discharge Note (most recent)     Final Note - 05/30/22 1415        Final Note    Assessment Type Final Discharge Note     Anticipated Discharge Disposition Home or Self Care     Hospital Resources/Appts/Education Provided Provided patient/caregiver with written discharge plan information;Appointments scheduled and added to AVS        Post-Acute Status    Discharge Delays None known at this time                 Important Message from Medicare             Contact Info     ALLIE YANES JR, MD   Specialty: Family Medicine   Relationship: PCP - General    [facility] [address]  [facility] [address]   Phone: [phone]       Next Steps: Follow up on 6/1/2022    Instructions: Hospital follow up visit at 10:00am, please arrive at 9:45am. Please bring your discharge summary, insurance card, current medications, and ID    COPD Discharge Clinic        Next Steps: Follow up    Instructions: The Clinic will contact you within 48 hrs to arrange you first appointment. If you do not hear from them by then, please call [phone].

## 2022-05-30 NOTE — PLAN OF CARE
Pt alert, stable; scheduled meds given without difficulty; pt still with SOB with exertion; discharge pending home oxygen; no distress noted during shift; safety maintained.    Problem: Adult Inpatient Plan of Care  Goal: Plan of Care Review  Outcome: Ongoing, Progressing  Goal: Patient-Specific Goal (Individualized)  Outcome: Ongoing, Progressing  Goal: Absence of Hospital-Acquired Illness or Injury  Outcome: Ongoing, Progressing  Goal: Optimal Comfort and Wellbeing  Outcome: Ongoing, Progressing  Goal: Readiness for Transition of Care  Outcome: Ongoing, Progressing     Problem: Adjustment to Illness COPD (Chronic Obstructive Pulmonary Disease)  Goal: Optimal Chronic Illness Coping  Outcome: Ongoing, Progressing     Problem: Functional Ability Impaired COPD (Chronic Obstructive Pulmonary Disease)  Goal: Optimal Level of Functional [place]  Outcome: Ongoing, Progressing     Problem: Infection COPD (Chronic Obstructive Pulmonary Disease)  Goal: Absence of Infection Signs and Symptoms  Outcome: Ongoing, Progressing     Problem: Oral Intake Inadequate COPD (Chronic Obstructive Pulmonary Disease)  Goal: Improved Nutrition Intake  Outcome: Ongoing, Progressing     Problem: Respiratory Compromise COPD (Chronic Obstructive Pulmonary Disease)  Goal: Effective Oxygenation and Ventilation  Outcome: Ongoing, Progressing     Problem: Infection  Goal: Absence of Infection Signs and Symptoms  Outcome: Ongoing, Progressing

## 2022-05-30 NOTE — NURSING
Home Oxygen Evaluation    Date Performed: 2022    1) Patient's Home O2 Sat on room air, while at rest: 94%        If O2 sats on room air at rest are 88% or below, patient qualifies. No additional testing needed. Document N/A in steps 2 and 3. If 89% or above, complete steps 2.      2) Patient's O2 Sat on room air while exercisin%        If O2 sats on room air while exercising remain 89% or above patient does not qualify, no further testing needed Document N/A in step 3. If O2 sats on room air while exercising are 88% or below, continue to step 3.      3) Patient's O2 Sat while exercising on O2: 96%         (Must show improvement from #2 for patients to qualify)    If O2 sats improve on oxygen, patient qualifies for portable oxygen. If not, the patient does not qualify.

## 2022-06-08 NOTE — DISCHARGE SUMMARY
"[facility] Medicine  Discharge Summary      Patient Name: Avni Reyes  MRN: [id number]  Patient Class: IP- Inpatient  Admission Date: 5/26/2022  Hospital Length of Stay: 1 days  Discharge Date and Time: 5/30/2022  1:54 PM  Attending Physician: No att. providers found   Discharging Provider: Flavia Barton PA-C  Primary Care Provider: ALLIE YANES JR, MD  Hospital Medicine Team: [facility] HOSP MED F Flavia Barton PA-C    HPI:   Mr. Reyes is a 66 y.o. male with COPD, HTN, HLD, BPH presenting w/ c/o shortness of breath x1 week. Pt states he been HERRERA for "a long time" that usually improves w/ use of his inhaler. He reports increased usage of his inhalers, using it 2-3 times this morning alone. He saw his PCP this am for a previously established appointment where he was found to be desatting while walking and was subsequently sent to [facility] ED.  He denies any new fever. He endorses a chronic cough that has recently become productive green sputum. Pt further denies any chills, myalgias, congestion, sore throat, chest pain, n/v/d, abdominal pain, dark or tarry stools, BRBPR, decreased UOP, lower extremity edema.    In ED, Pt afebrile, tachycardic, hypertensive, SPO2 % on NC. CBC was notable for a normocytic anemia H/H 11.1/36.9. CMP showed a HCO3 31, AG 7. ABG pH 7.36, PCO2 55.9, PO2 104, HCO3 31.9. CXR revealing the lungs are symmetrically expanded. Stable right lung base linear opacity likely atelectasis. There are changes of pulmonary emphysema. No large consolidation, effusion or pneumothorax. Pt on 2L NC, s/p duonebs x2. Initiated on COPD pathway.      * No surgery found *      Hospital Course:   Patient placed in observation for COPD exacerbation. ABG reassuring. Started on scheduled duonebs, prednisone, and Levaquin. Patient requiring 2L NC to keep O2 sats >93%, which is new from baseline. 6MWT with evidence of O2 desaturation upon ambulation. Intermittent episodes of tachycardia, " d-dimer ordered due to concern of PE. D-dimer positive, VQ scan with low probability of PE. CT chest without contrast with severe panlobular emphysematous changes. Patient reports improvement in SOB and productive cough. Patient stable for discharge with oxygen. Instructed patient to follow-up with PCP within 1-2 weeks. Referral placed for COPD clinic. Return precautions given and patient verbalized understanding. All questions answered.       Goals of Care Treatment Preferences:  Code Status: Full Code      Consults:     * COPD (chronic obstructive pulmonary disease)  SOB  Fatigue    Patient's COPD is with exacerbation noted by continued dyspnea and worsening of baseline hypoxia currently.  Patient is currently on COPD Pathway. Continue scheduled inhalers Steroids, Antibiotics and Supplemental oxygen and monitor respiratory status closely.   - 2L O2 NC, wean as tolerated.   - scheduled duonebs q6h  - continue home spiriva   - prednisone 40 mg qd x5 days last day 6/1  - levaquin 750 mg qd x5 days last day 6/1 (Qtc 444)  - patient continues to be hypoxic and tachycardiac despite management listed above, d-dimer ordered and positive  - VQ scan pending >> low probability for PE  - CT chest without contrast ordered - severe panlobular emphysematous changes  - discharged with home oxygen and referral to COPD clinic    HTN (hypertension)  - not currently on OP antihypertensives  - normotensive during admission      Smoking greater than 40 pack years  Dangers of cigarette smoking were reviewed with patient in detail for 5 minutes and patient was encouraged to quit. Nicotine replacement options were discussed.  - Patient states that he is planning to quit      Hyperlipidemia  - continue home atorvastatin 40 mg qd      Benign prostatic hyperplasia without urinary obstruction  - continue home flomax qd      Acute respiratory failure with hypoxia and hypercarbia  Patient with Hypercapnic and Hypoxic Respiratory failure which  "is Acute.  he is not on home oxygen. Supplemental oxygen was provided and noted % on 2L NC  .   Signs/symptoms of respiratory failure include- tachypnea and increased work of breathing. Contributing diagnoses includes - COPD Labs and images were reviewed. Patient Has recent ABG, which has been reviewed. Will treat underlying causes and adjust management of respiratory failure as follows  -2L O2 NC, wean as tolerated  -see COPD    Gastroesophageal reflux disease with esophagitis  - protonix 40 mg qd        Final Active Diagnoses:    Diagnosis Date Noted POA    PRINCIPAL PROBLEM:  COPD (chronic obstructive pulmonary disease) [J44.9] 10/20/2021 Yes    HTN (hypertension) [I10] 04/07/2013 Yes    Smoking greater than 40 pack years [F17.210] 01/15/2018 Yes    Hyperlipidemia [E78.5] 04/07/2013 Yes    Benign prostatic hyperplasia without urinary obstruction [N40.0] 10/20/2021 Yes    Acute respiratory failure with hypoxia and hypercarbia [J96.01, J96.02] 05/26/2022 Yes    Gastroesophageal reflux disease with esophagitis [K21.00] 10/20/2021 Yes    SOB (shortness of breath) [R06.02]  Yes      Problems Resolved During this Admission:       Discharged Condition: fair    Disposition: Home or Self Care    Follow Up:   Follow-up Information     ALLIE YANES JR, MD Follow up on 6/1/2022.    Specialty: Family Medicine  Why: Hospital follow up visit at 10:00am, please arrive at 9:45am. Please bring your discharge summary, insurance card, current medications, and ID  Contact information:  [address]  [facility] [address]  [phone]             COPD Discharge Clinic Follow up.    Why: The Clinic will contact you within 48 hrs to arrange you first appointment. If you do not hear from them by then, please call [phone].                     Patient Instructions:      NEBULIZER FOR HOME USE     Order Specific Question Answer Comments   Height: 6' 1" (1.854 m)    Weight: 70.8 kg " "(156 lb)    Does patient have medical equipment at home? none    Length of need (1-99 months): 99      OXYGEN FOR HOME USE     Order Specific Question Answer Comments   Liter Flow 2    Duration Continuous    Qualifying Test Performed at: Activity    Oxygen saturation at rest 94    Oxygen saturation with activity 82    Oxygen saturation with activity on oxygen 96    Portable mode: continuous    Route nasal cannula    Device: home concentrator with portable tanks    Length of need (in months): 99 mos    Patient condition with qualifying saturation COPD    Height: 6' 1" (1.854 m)    Weight: 70.8 kg (156 lb)    Alternative treatment measures have been tried or considered and deemed clinically ineffective. Yes      Ambulatory referral/consult to COPD Discharge Clinic   Standing Status: Future   Referral Priority: Urgent Referral Type: Consultation   Referral Reason: Specialty Services Required   Requested Specialty: Pulmonary Disease   Number of Visits Requested: 1     Diet Cardiac     Notify your health care provider if you experience any of the following:  temperature >100.4     Notify your health care provider if you experience any of the following:  difficulty breathing or increased cough     Notify your health care provider if you experience any of the following:  increased confusion or weakness     Activity as tolerated       Significant Diagnostic Studies: Radiology: CT scan: CT chest without contrast, VQ scan    Pending Diagnostic Studies:     None         Medications:  Reconciled Home Medications:      Medication List      START taking these medications    albuterol-ipratropium 2.5 mg-0.5 mg/3 mL nebulizer solution  Commonly known as: DUO-NEB  Take 3 mLs by nebulization every 6 (six) hours while awake. Rescue  Replaces: ipratropium-albuteroL  mcg/actuation inhaler        CHANGE how you take these medications    tamsulosin 0.4 mg Cap  Commonly known as: FLOMAX  Take 1 capsule (0.4 mg total) by mouth once " daily.  What changed: Another medication with the same name was removed. Continue taking this medication, and follow the directions you see here.        CONTINUE taking these medications    albuterol 90 mcg/actuation inhaler  Commonly known as: PROVENTIL HFA  Inhale 2 puffs into the lungs every 6 (six) hours as needed for Wheezing. Rescue     atorvastatin 40 MG tablet  Commonly known as: LIPITOR  Take 1 tablet (40 mg total) by mouth once daily.     fluticasone-umeclidin-vilanter 100-62.5-25 mcg Dsdv  Commonly known as: TRELEGY ELLIPTA  Inhale 1 puff into the lungs once daily.     omeprazole 20 MG capsule  Commonly known as: PRILOSEC  Take 1 capsule (20 mg total) by mouth once daily.        STOP taking these medications    CombiVENT RESPIMAT  mcg/actuation inhaler  Generic drug: ipratropium-albuteroL     ipratropium-albuteroL  mcg/actuation inhaler  Commonly known as: CombiVENT  Replaced by: albuterol-ipratropium 2.5 mg-0.5 mg/3 mL nebulizer solution     SPIRIVA WITH HANDIHALER 18 mcg inhalation capsule  Generic drug: tiotropium        ASK your doctor about these medications    levoFLOXacin 750 MG tablet  Commonly known as: LEVAQUIN  Take 1 tablet (750 mg total) by mouth every evening. for 2 days  Ask about: Should I take this medication?     MUCUS DM  mg per 12 hr tablet  Generic drug: dextromethorphan-guaiFENesin  mg  Take 1 tablet by mouth 2 (two) times daily. for 7 days  Ask about: Should I take this medication?     predniSONE 20 MG tablet  Commonly known as: DELTASONE  Take 2 tablets (40 mg total) by mouth once daily. for 1 day  Ask about: Should I take this medication?            Indwelling Lines/Drains at time of discharge:   Lines/Drains/Airways     None                 Time spent on the discharge of patient: 35 minutes     Discussed patient's plan of care with Dr. Ayde Barton PA-C  Department of Hospital Medicine  [facility]

## 2022-06-08 NOTE — ASSESSMENT & PLAN NOTE
SOB  Fatigue    Patient's COPD is with exacerbation noted by continued dyspnea and worsening of baseline hypoxia currently.  Patient is currently on COPD Pathway. Continue scheduled inhalers Steroids, Antibiotics and Supplemental oxygen and monitor respiratory status closely.   - 2L O2 NC, wean as tolerated.   - scheduled duonebs q6h  - continue home spiriva   - prednisone 40 mg qd x5 days last day 6/1  - levaquin 750 mg qd x5 days last day 6/1 (Qtc 444)  - patient continues to be hypoxic and tachycardiac despite management listed above, d-dimer ordered and positive  - VQ scan pending >> low probability for PE  - CT chest without contrast ordered - severe panlobular emphysematous changes  - discharged with home oxygen and referral to COPD clinic

## 2022-06-21 ENCOUNTER — TELEPHONE (OUTPATIENT)
Dept: SMOKING CESSATION | Facility: CLINIC | Age: 67
End: 2022-06-21
Payer: MEDICARE

## 2022-06-21 NOTE — TELEPHONE ENCOUNTER
Smoking Cessation Clinic- called patient for scheduled telephonic/clinic appointment Quit 1 intake today. Spoke with patient and he stated that he forgot about appointment. Patient has rescheduled intake in the past.  [facility], [facility]  [phone] or [phone].

## 2022-07-22 DIAGNOSIS — J44.1 COPD EXACERBATION: ICD-10-CM

## 2022-07-27 RX ORDER — IPRATROPIUM BROMIDE AND ALBUTEROL 20; 100 UG/1; UG/1
SPRAY, METERED RESPIRATORY (INHALATION)
Qty: 4 G | Refills: 11 | Status: SHIPPED | OUTPATIENT
Start: 2022-07-27 | End: 2022-11-14 | Stop reason: SDUPTHER

## 2022-08-10 ENCOUNTER — TELEPHONE (OUTPATIENT)
Dept: INTERNAL MEDICINE | Facility: CLINIC | Age: 67
End: 2022-08-10
Payer: MEDICARE

## 2022-08-10 NOTE — TELEPHONE ENCOUNTER
----- Message from Jeremy Friedman sent at 8/9/2022 11:40 AM CDT -----  Contact: [phone]  Pt is calling in, he is requesting a call back, the phone was breaking up really badly so I couldn't hear him very well, please return call, thanks

## 2022-08-12 DIAGNOSIS — J44.9 CHRONIC OBSTRUCTIVE PULMONARY DISEASE, UNSPECIFIED COPD TYPE: Primary | ICD-10-CM

## 2022-08-15 ENCOUNTER — TELEPHONE (OUTPATIENT)
Dept: INTERNAL MEDICINE | Facility: CLINIC | Age: 67
End: 2022-08-15
Payer: MEDICARE

## 2022-08-15 NOTE — TELEPHONE ENCOUNTER
----- Message from Ishaan Mcdaniel MD sent at 8/12/2022  9:41 PM CDT -----  Contact:  Mobile [phone]  I placed orders for home oxygen.     ----- Message -----  From: Marcelo Parra MA  Sent: 8/10/2022   9:47 AM CDT  To: Ishaan Mcdaniel MD      ----- Message -----  From: Emerita Bobby  Sent: 8/10/2022   9:09 AM CDT  To: Jonas Quiros Staff    Patient is calling in regards to him wanting to put in an order for an Portable Oxygen Machine.

## 2022-08-22 ENCOUNTER — TELEPHONE (OUTPATIENT)
Dept: INTERNAL MEDICINE | Facility: CLINIC | Age: 67
End: 2022-08-22
Payer: MEDICARE

## 2022-08-22 NOTE — TELEPHONE ENCOUNTER
Oxygen order placed on 8/12. Called patient's cell phone, went to voicemail. Left a voicemail about his waiting list status for portable concentrator. In the meantime, he can call [phone] for portable tanks.       ----- Message from Arabella Garcia sent at 8/22/2022 12:00 PM CDT -----  Contact: [phone]  Pt called to see if he can get an RX for portable oxygen. Please Advise

## 2022-10-29 DIAGNOSIS — Z12.11 SCREENING FOR COLON CANCER: Primary | ICD-10-CM

## 2022-11-01 DIAGNOSIS — R39.11 URINARY HESITANCY: ICD-10-CM

## 2022-11-01 RX ORDER — TAMSULOSIN HYDROCHLORIDE 0.4 MG/1
0.4 CAPSULE ORAL DAILY
Qty: 90 CAPSULE | Refills: 3 | Status: SHIPPED | OUTPATIENT
Start: 2022-11-01 | End: 2023-10-27

## 2022-11-14 DIAGNOSIS — J44.1 COPD EXACERBATION: ICD-10-CM

## 2022-11-15 RX ORDER — IPRATROPIUM BROMIDE AND ALBUTEROL 20; 100 UG/1; UG/1
SPRAY, METERED RESPIRATORY (INHALATION)
Qty: 4 G | Refills: 11 | Status: SHIPPED | OUTPATIENT
Start: 2022-11-15 | End: 2022-11-21 | Stop reason: SDUPTHER

## 2022-11-21 ENCOUNTER — LAB VISIT (OUTPATIENT)
Dept: LAB | Facility: HOSPITAL | Age: 67
End: 2022-11-21
Payer: MEDICARE

## 2022-11-21 ENCOUNTER — OFFICE VISIT (OUTPATIENT)
Dept: INTERNAL MEDICINE | Facility: CLINIC | Age: 67
End: 2022-11-21
Payer: MEDICARE

## 2022-11-21 VITALS
SYSTOLIC BLOOD PRESSURE: 110 MMHG | BODY MASS INDEX: 18.12 KG/M2 | OXYGEN SATURATION: 89 % | DIASTOLIC BLOOD PRESSURE: 68 MMHG | WEIGHT: 136.69 LBS | HEART RATE: 124 BPM | HEIGHT: 73 IN

## 2022-11-21 DIAGNOSIS — Z13.1 DIABETES MELLITUS SCREENING: ICD-10-CM

## 2022-11-21 DIAGNOSIS — Z11.4 SCREENING FOR HIV WITHOUT PRESENCE OF RISK FACTORS: ICD-10-CM

## 2022-11-21 DIAGNOSIS — R91.8 MULTIPLE LUNG NODULES ON CT: ICD-10-CM

## 2022-11-21 DIAGNOSIS — K40.30 NON-RECURRENT UNILATERAL INGUINAL HERNIA WITH OBSTRUCTION WITHOUT GANGRENE: ICD-10-CM

## 2022-11-21 DIAGNOSIS — Z12.11 COLON CANCER SCREENING: ICD-10-CM

## 2022-11-21 DIAGNOSIS — D53.9 MACROCYTIC ANEMIA: ICD-10-CM

## 2022-11-21 DIAGNOSIS — D64.9 ANEMIA, UNSPECIFIED TYPE: ICD-10-CM

## 2022-11-21 DIAGNOSIS — N40.0 BENIGN PROSTATIC HYPERPLASIA WITHOUT URINARY OBSTRUCTION: ICD-10-CM

## 2022-11-21 DIAGNOSIS — I10 HYPERTENSION, UNSPECIFIED TYPE: ICD-10-CM

## 2022-11-21 DIAGNOSIS — J44.1 CHRONIC OBSTRUCTIVE PULMONARY DISEASE WITH ACUTE EXACERBATION: ICD-10-CM

## 2022-11-21 DIAGNOSIS — E78.5 HYPERLIPIDEMIA, UNSPECIFIED HYPERLIPIDEMIA TYPE: ICD-10-CM

## 2022-11-21 DIAGNOSIS — J44.1 COPD EXACERBATION: ICD-10-CM

## 2022-11-21 DIAGNOSIS — Z12.5 ENCOUNTER FOR SCREENING FOR MALIGNANT NEOPLASM OF PROSTATE: ICD-10-CM

## 2022-11-21 DIAGNOSIS — R97.20 ELEVATED PSA: ICD-10-CM

## 2022-11-21 DIAGNOSIS — Z72.0 TOBACCO USER: ICD-10-CM

## 2022-11-21 DIAGNOSIS — J44.9 CHRONIC OBSTRUCTIVE PULMONARY DISEASE, UNSPECIFIED COPD TYPE: ICD-10-CM

## 2022-11-21 DIAGNOSIS — R97.20 ELEVATED PSA: Primary | ICD-10-CM

## 2022-11-21 DIAGNOSIS — F17.210 SMOKING GREATER THAN 40 PACK YEARS: ICD-10-CM

## 2022-11-21 DIAGNOSIS — R10.9 ABDOMINAL PAIN, UNSPECIFIED ABDOMINAL LOCATION: ICD-10-CM

## 2022-11-21 DIAGNOSIS — F17.219 NICOTINE DEPENDENCE, CIGARETTES, WITH UNSPECIFIED NICOTINE-INDUCED DISORDERS: ICD-10-CM

## 2022-11-21 DIAGNOSIS — Z13.6 ENCOUNTER FOR ABDOMINAL AORTIC ANEURYSM (AAA) SCREENING: ICD-10-CM

## 2022-11-21 LAB
BASOPHILS # BLD AUTO: 0.03 K/UL (ref 0–0.2)
BASOPHILS NFR BLD: 0.8 % (ref 0–1.9)
CHOLEST SERPL-MCNC: 185 MG/DL (ref 120–199)
CHOLEST/HDLC SERPL: 2.9 {RATIO} (ref 2–5)
COMPLEXED PSA SERPL-MCNC: 27.6 NG/ML (ref 0–4)
CREAT SERPL-MCNC: 1 MG/DL (ref 0.5–1.4)
DIFFERENTIAL METHOD: ABNORMAL
EOSINOPHIL # BLD AUTO: 0.1 K/UL (ref 0–0.5)
EOSINOPHIL NFR BLD: 1.3 % (ref 0–8)
ERYTHROCYTE [DISTWIDTH] IN BLOOD BY AUTOMATED COUNT: 15.8 % (ref 11.5–14.5)
EST. GFR  (NO RACE VARIABLE): >60 ML/MIN/1.73 M^2
FERRITIN SERPL-MCNC: 18 NG/ML (ref 20–300)
FOLATE SERPL-MCNC: 9.4 NG/ML (ref 4–24)
HCT VFR BLD AUTO: 42.5 % (ref 40–54)
HDLC SERPL-MCNC: 64 MG/DL (ref 40–75)
HDLC SERPL: 34.6 % (ref 20–50)
HGB BLD-MCNC: 12.5 G/DL (ref 14–18)
IMM GRANULOCYTES # BLD AUTO: 0.01 K/UL (ref 0–0.04)
IMM GRANULOCYTES NFR BLD AUTO: 0.3 % (ref 0–0.5)
IRON SERPL-MCNC: 20 UG/DL (ref 45–160)
LDLC SERPL CALC-MCNC: 105 MG/DL (ref 63–159)
LYMPHOCYTES # BLD AUTO: 1.2 K/UL (ref 1–4.8)
LYMPHOCYTES NFR BLD: 32.8 % (ref 18–48)
MCH RBC QN AUTO: 27.2 PG (ref 27–31)
MCHC RBC AUTO-ENTMCNC: 29.4 G/DL (ref 32–36)
MCV RBC AUTO: 92 FL (ref 82–98)
MONOCYTES # BLD AUTO: 0.4 K/UL (ref 0.3–1)
MONOCYTES NFR BLD: 11.6 % (ref 4–15)
NEUTROPHILS # BLD AUTO: 2 K/UL (ref 1.8–7.7)
NEUTROPHILS NFR BLD: 53.2 % (ref 38–73)
NONHDLC SERPL-MCNC: 121 MG/DL
NRBC BLD-RTO: 0 /100 WBC
PLATELET # BLD AUTO: 366 K/UL (ref 150–450)
PMV BLD AUTO: 10.2 FL (ref 9.2–12.9)
RBC # BLD AUTO: 4.6 M/UL (ref 4.6–6.2)
SATURATED IRON: 5 % (ref 20–50)
TOTAL IRON BINDING CAPACITY: 429 UG/DL (ref 250–450)
TRANSFERRIN SERPL-MCNC: 290 MG/DL (ref 200–375)
TRIGL SERPL-MCNC: 80 MG/DL (ref 30–150)
VIT B12 SERPL-MCNC: 702 PG/ML (ref 210–950)
WBC # BLD AUTO: 3.78 K/UL (ref 3.9–12.7)

## 2022-11-21 PROCEDURE — 3288F FALL RISK ASSESSMENT DOCD: CPT | Mod: CPTII,GC,S$GLB,

## 2022-11-21 PROCEDURE — 1125F AMNT PAIN NOTED PAIN PRSNT: CPT | Mod: CPTII,GC,S$GLB,

## 2022-11-21 PROCEDURE — 99213 OFFICE O/P EST LOW 20 MIN: CPT | Mod: GC,S$GLB,,

## 2022-11-21 PROCEDURE — 3074F SYST BP LT 130 MM HG: CPT | Mod: CPTII,GC,S$GLB,

## 2022-11-21 PROCEDURE — 36415 COLL VENOUS BLD VENIPUNCTURE: CPT

## 2022-11-21 PROCEDURE — 80061 LIPID PANEL: CPT

## 2022-11-21 PROCEDURE — 84153 ASSAY OF PSA TOTAL: CPT

## 2022-11-21 PROCEDURE — 84466 ASSAY OF TRANSFERRIN: CPT

## 2022-11-21 PROCEDURE — 1159F PR MEDICATION LIST DOCUMENTED IN MEDICAL RECORD: ICD-10-PCS | Mod: CPTII,GC,S$GLB,

## 2022-11-21 PROCEDURE — 99213 PR OFFICE/OUTPT VISIT, EST, LEVL III, 20-29 MIN: ICD-10-PCS | Mod: GC,S$GLB,,

## 2022-11-21 PROCEDURE — 82728 ASSAY OF FERRITIN: CPT

## 2022-11-21 PROCEDURE — 1160F RVW MEDS BY RX/DR IN RCRD: CPT | Mod: CPTII,GC,S$GLB,

## 2022-11-21 PROCEDURE — 82565 ASSAY OF CREATININE: CPT

## 2022-11-21 PROCEDURE — 3074F PR MOST RECENT SYSTOLIC BLOOD PRESSURE < 130 MM HG: ICD-10-PCS | Mod: CPTII,GC,S$GLB,

## 2022-11-21 PROCEDURE — 1159F MED LIST DOCD IN RCRD: CPT | Mod: CPTII,GC,S$GLB,

## 2022-11-21 PROCEDURE — 85025 COMPLETE CBC W/AUTO DIFF WBC: CPT

## 2022-11-21 PROCEDURE — 82746 ASSAY OF FOLIC ACID SERUM: CPT

## 2022-11-21 PROCEDURE — 3008F PR BODY MASS INDEX (BMI) DOCUMENTED: ICD-10-PCS | Mod: CPTII,GC,S$GLB,

## 2022-11-21 PROCEDURE — 87389 HIV-1 AG W/HIV-1&-2 AB AG IA: CPT

## 2022-11-21 PROCEDURE — 3078F PR MOST RECENT DIASTOLIC BLOOD PRESSURE < 80 MM HG: ICD-10-PCS | Mod: CPTII,GC,S$GLB,

## 2022-11-21 PROCEDURE — 1101F PR PT FALLS ASSESS DOC 0-1 FALLS W/OUT INJ PAST YR: ICD-10-PCS | Mod: CPTII,GC,S$GLB,

## 2022-11-21 PROCEDURE — 1125F PR PAIN SEVERITY QUANTIFIED, PAIN PRESENT: ICD-10-PCS | Mod: CPTII,GC,S$GLB,

## 2022-11-21 PROCEDURE — 99999 PR PBB SHADOW E&M-EST. PATIENT-LVL V: CPT | Mod: PBBFAC,GC,,

## 2022-11-21 PROCEDURE — 3078F DIAST BP <80 MM HG: CPT | Mod: CPTII,GC,S$GLB,

## 2022-11-21 PROCEDURE — 3288F PR FALLS RISK ASSESSMENT DOCUMENTED: ICD-10-PCS | Mod: CPTII,GC,S$GLB,

## 2022-11-21 PROCEDURE — 82607 VITAMIN B-12: CPT

## 2022-11-21 PROCEDURE — 3008F BODY MASS INDEX DOCD: CPT | Mod: CPTII,GC,S$GLB,

## 2022-11-21 PROCEDURE — 99999 PR PBB SHADOW E&M-EST. PATIENT-LVL V: ICD-10-PCS | Mod: PBBFAC,GC,,

## 2022-11-21 PROCEDURE — 1160F PR REVIEW ALL MEDS BY PRESCRIBER/CLIN PHARMACIST DOCUMENTED: ICD-10-PCS | Mod: CPTII,GC,S$GLB,

## 2022-11-21 PROCEDURE — 1101F PT FALLS ASSESS-DOCD LE1/YR: CPT | Mod: CPTII,GC,S$GLB,

## 2022-11-21 RX ORDER — IPRATROPIUM BROMIDE AND ALBUTEROL 20; 100 UG/1; UG/1
SPRAY, METERED RESPIRATORY (INHALATION)
Qty: 4 G | Refills: 11 | Status: SHIPPED | OUTPATIENT
Start: 2022-11-21 | End: 2023-01-31 | Stop reason: SDUPTHER

## 2022-11-21 NOTE — PROGRESS NOTES
I have discussed the case with house staff and I have reviewed the appropriate portions of the patient's chart, and the house staff's history and physical, assessment and plan. I agree with the findings, assessment and plan.  Discussed at length and it seems his predominant problems today are difficulty with home oxygen.  I reviewed the chart and did not find a qualifying oxygen saturation test.  He was seen in pulmonary years ago.  PFTs 2019.  Severe emphysema.  Referrals have been placed to Urology in the past, patient has not followed through.  Feel that is high priority item and will try to schedule that from the clinic today, stressing the patient the importance of getting the prostate test, which could be an indicator of cancer, checked out and identified.  Likewise low-grade abdominal pain is reported.  Recommend further workup and follow-up with Gastrointestinal Medicine and Gen surgery.

## 2022-11-21 NOTE — PROGRESS NOTES
Clinic Note  11/21/2022      Subjective:       Patient ID:  Avni is a 66 y.o. male being seen for an established care visit.    Chief Complaint: Annual Exam    Mr. Reyes is a 62M with tobacco abuse (>40 pack years), COPD, HTN, HLD, elevated PSA who presents to for follow-up. Last time, was sent to ED for low O2 sat during walk test in clinic.      Urinary issues -   - Flomax working well  - nocturia better since flomax  - takes a long time to initiate urine stream  - no pain with urination  - urge to go 3x/day, at night as well  - no hematuria  - no weight loss  - PSA elevated x2  - has not yet seen urology here or [facility]     Left inguinal hernia  - saw general surgery, opted to defer surgery given that it's relatively asymptomatic other than protruding from time to time. Hernia precautions given by surgery  - Current smoker, high risk for general anesthesia. Opted to defer surgery given that's it's relatively asymptomatic.   - hurting for past month, still reducible when he lays down to sleep    COPD  - congestion, white sputum production, rarely light green  - dyspnea sometimes  - cough  - needs to catch breath in the morning  - harder to breath especially at night  - Using trelegy as needed: in am   - Uses inhalers 2 times a day for SOB  - can walk to door before feeling short of breath    Lung nodules: 4 mm on CT chest ordered during hospitalization     Tobacco use: 1/4 pack/day.   Cessation program? Has tried gum before. Interested in chantix, cost covered if patient enrolled in tobacco cessation program     From previous visit, screening LD CT revealed bilateral solid pulmonary nodules measuring up to 0.5 cm, new when compared to the previous CTA of 01/15/2018 and emphysema Lung-RADS Category:  2 - Benign Appearance or Behavior - continue annual screening with LDCT in 12 months.      Care gaps addressed:   -one time [facility] screening for tobacco use negative  -Annual Low dose CT for Nov 2022 Lung RADS category 2  - follow up in one year  -Colonoscopy: Colonoscopy? Not yet, referral made in Oct 2021     Vaccinations:  Flu: due  Pneumococcal: due PCV13  TDAP: completed 2018   Shingrix: second dose due. Thinks he got it at [facility]  COVID: completed including 2 boosters - 4 total. Due bivalent  HPV: NA     Risk Assessment:  Social Hx  Tobacco use: current smoker  EtOH use: beer 1x/week  Illicit drug use: quit  IVDU:   Diet:   Exercise:   Occupation:   Sexual hx  - partners  - protection  - concern for STI    Health Maintenance:  Colonoscopy: not done, ordered  HIV screen: ordered  HCV screen: negative  Lipid panel: ordered  PSA: elevated    Functionality:  Lives alone  Is able to complete daily activities  Ambulates independently         Review of Systems   Constitutional:  Negative for chills, fever and weight loss.        Poor appetite   Eyes: Negative.    Respiratory:  Positive for sputum production and shortness of breath. Negative for cough, hemoptysis and wheezing.    Cardiovascular:  Negative for chest pain, palpitations and leg swelling.   Gastrointestinal:  Positive for abdominal pain. Negative for blood in stool, constipation, diarrhea, nausea and vomiting.   Genitourinary:  Negative for dysuria, frequency, hematuria and urgency.   Musculoskeletal:  Negative for back pain and myalgias.   Neurological:  Negative for dizziness, weakness and headaches.     Medication List with Changes/Refills   Current Medications    ALBUTEROL (PROVENTIL HFA) 90 MCG/ACTUATION INHALER    Inhale 2 puffs into the lungs every 6 (six) hours as needed for Wheezing. Rescue    ALBUTEROL-IPRATROPIUM (DUO-NEB) 2.5 MG-0.5 MG/3 ML NEBULIZER SOLUTION    Take 3 mLs by nebulization every 6 (six) hours while awake. Rescue    ATORVASTATIN (LIPITOR) 40 MG TABLET    Take 1 tablet (40 mg total) by mouth once daily.    OMEPRAZOLE (PRILOSEC) 20 MG CAPSULE    Take 1 capsule (20 mg total) by mouth once daily.    TAMSULOSIN (FLOMAX) 0.4 MG CAP    Take 1 capsule  "(0.4 mg total) by mouth once daily.   Changed and/or Refilled Medications    Modified Medication Previous Medication    FLUTICASONE-UMECLIDIN-VILANTER (TRELEGY ELLIPTA) 100-62.5-25 MCG DSDV fluticasone-umeclidin-vilanter (TRELEGY ELLIPTA) 100-62.5-25 mcg DsDv       Inhale 1 puff into the lungs once daily.    Inhale 1 puff into the lungs once daily.    IPRATROPIUM-ALBUTEROL (COMBIVENT RESPIMAT)  MCG/ACTUATION INHALER ipratropium-albuteroL (COMBIVENT RESPIMAT)  mcg/actuation inhaler       Inhale 1 puff by mouth four times daily    Inhale 1 puff by mouth four times daily       Patient Active Problem List   Diagnosis    HTN (hypertension)    Hyperlipidemia    Smoking greater than 40 pack years    SOB (shortness of breath)    COPD (chronic obstructive pulmonary disease)    Low vitamin D level    Benign prostatic hyperplasia without urinary obstruction    Finding of above normal blood pressure    Gastroesophageal reflux disease with esophagitis    Tobacco user    Chronic obstructive pulmonary disease    Elevated PSA    Non-recurrent unilateral inguinal hernia with obstruction without gangrene    Encounter for screening colonoscopy    Hypoxia    Acute respiratory failure with hypoxia and hypercarbia           Objective:      /68 (BP Location: Right arm, Patient Position: Sitting, BP Method: Large (Manual))   Pulse (!) 124   Ht 6' 1" (1.854 m)   Wt 62 kg (136 lb 11 oz)   SpO2 (!) 89%   BMI 18.03 kg/m²   Estimated body mass index is 18.03 kg/m² as calculated from the following:    Height as of this encounter: 6' 1" (1.854 m).    Weight as of this encounter: 62 kg (136 lb 11 oz).  Physical Exam  Vitals and nursing note reviewed.   Constitutional:       General: He is not in acute distress.     Appearance: Normal appearance.   HENT:      Head: Normocephalic and atraumatic.   Eyes:      General: No scleral icterus.  Cardiovascular:      Rate and Rhythm: Normal rate and regular rhythm.      Pulses: Normal " pulses.      Heart sounds: Normal heart sounds. No murmur heard.  Pulmonary:      Effort: No respiratory distress.      Breath sounds: No wheezing or rales.   Abdominal:      General: Abdomen is flat. Bowel sounds are normal. There is no distension.      Palpations: Abdomen is soft.      Tenderness: There is no abdominal tenderness. There is no guarding.   Musculoskeletal:      Cervical back: Normal range of motion.      Right lower leg: No edema.   Skin:     General: Skin is warm.      Coloration: Skin is not jaundiced.   Neurological:      General: No focal deficit present.      Mental Status: He is alert and oriented to person, place, and time.   Psychiatric:         Mood and Affect: Mood normal.         Behavior: Behavior normal.         Assessment and Plan:         Problem List Items Addressed This Visit          Pulmonary    COPD (chronic obstructive pulmonary disease)    Current Assessment & Plan     Refilled combivent and trelegy    - lost to f/u for pulmonology, sent pulm referral  - called medical equipment about oxygen, they are no longer dispensing portable home oxygen due to issues with the 's parts breaking. Patient has oxygen tanks delivered         Relevant Medications    fluticasone-umeclidin-vilanter (TRELEGY ELLIPTA) 100-62.5-25 mcg DsDv    Other Relevant Orders    Ambulatory referral/consult to Pulmonology    Chronic obstructive pulmonary disease    Relevant Medications    fluticasone-umeclidin-vilanter (TRELEGY ELLIPTA) 100-62.5-25 mcg DsDv    Other Relevant Orders    Ambulatory referral/consult to Pulmonology       Cardiac/Vascular    HTN (hypertension)    Hyperlipidemia    Relevant Orders    Lipid Panel       Renal/    Benign prostatic hyperplasia without urinary obstruction    Relevant Orders    Ambulatory referral/consult to General Surgery    Elevated PSA - Primary    Current Assessment & Plan     Elevated PSA x2    - ordered PSA  - urgent urology referral due to concerns for  prostate cancer         Relevant Orders    Ambulatory referral/consult to Urology       GI    Non-recurrent unilateral inguinal hernia with obstruction without gangrene    Current Assessment & Plan     Patient reports worse abdominal pain on left lower quadrant that he attributes to the hernia    - General surgery referral for reevaluation of hernia and possible surgery given worsening symptoms.   - CT abdomen/pelvis to r/o other causes of abdominal pain             Other    Smoking greater than 40 pack years    Current Assessment & Plan     Still smoking 1/4 ppd    - AAA screening with US  - LD CT for lung nodule follow up          Tobacco user    Relevant Orders    CT Chest Lung Screening Low Dose     Other Visit Diagnoses       Screening for HIV without presence of risk factors        Relevant Orders    HIV 1/2 Ag/Ab (4th Gen)    Encounter for abdominal aortic aneurysm (AAA) screening        Relevant Orders    US Abdominal Aorta    Diabetes mellitus screening        Anemia, unspecified type        Relevant Orders    CBC Auto Differential    FOLATE    IRON AND TIBC    FERRITIN    VITAMIN B12    Colon cancer screening        Relevant Orders    Cologuard Screening (Multitarget Stool DNA)    Abdominal pain, unspecified abdominal location        Relevant Orders    Ambulatory referral/consult to Gastroenterology    CT Abdomen Pelvis With Contrast    COPD exacerbation        Relevant Medications    ipratropium-albuteroL (COMBIVENT RESPIMAT)  mcg/actuation inhaler    Other Relevant Orders    Creatinine, serum    Macrocytic anemia        Relevant Orders    FOLATE    VITAMIN B12    Encounter for screening for malignant neoplasm of prostate        Multiple lung nodules on CT        Relevant Orders    CT Chest Lung Screening Low Dose    Nicotine dependence, cigarettes, with unspecified nicotine-induced disorders        Relevant Orders    CT Chest Lung Screening Low Dose            Follow Up:   Follow up in about 3  months (around 2/21/2023) for follow up.        Ishaan Mcdaniel      Signed:   Ishaan Mcdaniel MD - PGY2  Internal Medicine  [facility]

## 2022-11-21 NOTE — ASSESSMENT & PLAN NOTE
Refilled combivent and trelegy    - lost to f/u for pulmonology, sent pulm referral  - called medical equipment about oxygen, they are no longer dispensing portable home oxygen due to issues with the 's parts breaking. Patient has oxygen tanks delivered

## 2022-11-21 NOTE — PATIENT INSTRUCTIONS
Call [phone] for oxygen tanks. They no longer are doing portable oxygen since parts are breaking from that .   Schedule colonoscopy: [phone]    Make urgent appointment with urology.   Make appointments with general surgery, GI, and pulmonology  Get labwork  Get flu shot  Schedule CT lung, abdominal US and CT abdomen/pelvis  Do cologuard screening

## 2022-11-21 NOTE — ASSESSMENT & PLAN NOTE
Patient reports worse abdominal pain on left lower quadrant that he attributes to the hernia    - General surgery referral for reevaluation of hernia and possible surgery given worsening symptoms.   - CT abdomen/pelvis to r/o other causes of abdominal pain

## 2022-11-22 ENCOUNTER — TELEPHONE (OUTPATIENT)
Dept: INTERNAL MEDICINE | Facility: CLINIC | Age: 67
End: 2022-11-22
Payer: MEDICARE

## 2022-11-22 ENCOUNTER — TELEPHONE (OUTPATIENT)
Dept: UROLOGY | Facility: CLINIC | Age: 67
End: 2022-11-22
Payer: MEDICARE

## 2022-11-22 LAB — HIV 1+2 AB+HIV1 P24 AG SERPL QL IA: NORMAL

## 2022-11-22 NOTE — TELEPHONE ENCOUNTER
----- Message from Toshia Magaña sent at 11/22/2022  9:28 AM CST -----  Regarding: Urgent Urology  Pt needs to see urology urgently but after scheduling appointment was notified that his insurance will not cover appointment.     Mr Reyes was booked to see our Urology NP on 11/30/2022, he has [facility] dual complete, we are not in network. Do you know if pt is aware?

## 2022-11-22 NOTE — TELEPHONE ENCOUNTER
Contacted , explained , insurance is taken at [facility] for his urology   Agreed he would call and see where he can go    no

## 2022-11-22 NOTE — LETTER
November 23, 2022    Avni Reyes  [address] [place] [address]             Donavan Macdonald [facility]  [address]  [place] [address]  Phone: [phone]  Fax: [phone] Dear Mr. Reyes:    Your PSA level on your blood test has been elevated for the past 3 years, which is very concerning for prostate cancer. I made the urgent referral to [facility] during your last visit, but the appointment for 11/30 was canceled. [facility] would not be covered under your current insurance, so I reached out to [facility]. Their soonest appointment is December 9, 2022 at 9:45 am. The location is [address] [place] [address]. [place] [address] on the 5th floor.    Please make it a priority to go to this appointment.     If you have any questions or concerns, please don't hesitate to call.    Sincerely,        Ishaan Mcdaniel MD

## 2022-11-23 NOTE — TELEPHONE ENCOUNTER
Called [facility] department, unwilling to see patient due to insurance being out of network. Patient would have to pay out of pocket. Called [facility] billing services, unable to provide information about how much a urology visit would cost out of pocket.     Called [facility] scheduling department at [phone]. They have reached out to the patient in the past, but call was not answered. They soonest appointment they have is Dec. 9 at 9:45am. Urology clinic is on [address], 5th floor. Provided [facility] with patient's home phone, cell phone and sister's phone numbers. Verified that [facility] does accept patient's insurance.     Called patient on mobile number, patient did not answer, but left a message regarding details of urology appointment including time and address. Called patient's sister's number, wrong number on file. Drafted and sent a letter with the urology appointment details to patient's address as well.

## 2022-11-25 ENCOUNTER — TELEPHONE (OUTPATIENT)
Dept: INTERNAL MEDICINE | Facility: CLINIC | Age: 67
End: 2022-11-25
Payer: MEDICARE

## 2022-11-25 DIAGNOSIS — R97.20 ELEVATED PSA: Primary | ICD-10-CM

## 2022-12-19 ENCOUNTER — OFFICE VISIT (OUTPATIENT)
Dept: UROLOGY | Facility: CLINIC | Age: 67
End: 2022-12-19
Payer: MEDICARE

## 2022-12-19 ENCOUNTER — OFFICE VISIT (OUTPATIENT)
Dept: SURGERY | Facility: CLINIC | Age: 67
End: 2022-12-19
Payer: MEDICARE

## 2022-12-19 VITALS
HEART RATE: 115 BPM | BODY MASS INDEX: 18.88 KG/M2 | WEIGHT: 143.06 LBS | SYSTOLIC BLOOD PRESSURE: 116 MMHG | DIASTOLIC BLOOD PRESSURE: 69 MMHG

## 2022-12-19 VITALS
SYSTOLIC BLOOD PRESSURE: 137 MMHG | DIASTOLIC BLOOD PRESSURE: 44 MMHG | BODY MASS INDEX: 18.95 KG/M2 | HEART RATE: 110 BPM | HEIGHT: 73 IN | WEIGHT: 142.94 LBS

## 2022-12-19 DIAGNOSIS — N40.0 BENIGN PROSTATIC HYPERPLASIA WITHOUT URINARY OBSTRUCTION: ICD-10-CM

## 2022-12-19 DIAGNOSIS — R97.20 ELEVATED PSA: ICD-10-CM

## 2022-12-19 PROCEDURE — 99999 PR PBB SHADOW E&M-EST. PATIENT-LVL IV: ICD-10-PCS | Mod: PBBFAC,,, | Performed by: UROLOGY

## 2022-12-19 PROCEDURE — 1125F AMNT PAIN NOTED PAIN PRSNT: CPT | Mod: CPTII,S$GLB,, | Performed by: UROLOGY

## 2022-12-19 PROCEDURE — 1101F PR PT FALLS ASSESS DOC 0-1 FALLS W/OUT INJ PAST YR: ICD-10-PCS | Mod: CPTII,S$GLB,, | Performed by: STUDENT IN AN ORGANIZED HEALTH CARE EDUCATION/TRAINING PROGRAM

## 2022-12-19 PROCEDURE — 1159F MED LIST DOCD IN RCRD: CPT | Mod: CPTII,S$GLB,, | Performed by: STUDENT IN AN ORGANIZED HEALTH CARE EDUCATION/TRAINING PROGRAM

## 2022-12-19 PROCEDURE — 3288F FALL RISK ASSESSMENT DOCD: CPT | Mod: CPTII,S$GLB,, | Performed by: UROLOGY

## 2022-12-19 PROCEDURE — 3288F PR FALLS RISK ASSESSMENT DOCUMENTED: ICD-10-PCS | Mod: CPTII,S$GLB,, | Performed by: UROLOGY

## 2022-12-19 PROCEDURE — 3288F PR FALLS RISK ASSESSMENT DOCUMENTED: ICD-10-PCS | Mod: CPTII,S$GLB,, | Performed by: STUDENT IN AN ORGANIZED HEALTH CARE EDUCATION/TRAINING PROGRAM

## 2022-12-19 PROCEDURE — 1101F PR PT FALLS ASSESS DOC 0-1 FALLS W/OUT INJ PAST YR: ICD-10-PCS | Mod: CPTII,S$GLB,, | Performed by: UROLOGY

## 2022-12-19 PROCEDURE — 3078F PR MOST RECENT DIASTOLIC BLOOD PRESSURE < 80 MM HG: ICD-10-PCS | Mod: CPTII,S$GLB,, | Performed by: UROLOGY

## 2022-12-19 PROCEDURE — 1101F PT FALLS ASSESS-DOCD LE1/YR: CPT | Mod: CPTII,S$GLB,, | Performed by: STUDENT IN AN ORGANIZED HEALTH CARE EDUCATION/TRAINING PROGRAM

## 2022-12-19 PROCEDURE — 3075F SYST BP GE 130 - 139MM HG: CPT | Mod: CPTII,S$GLB,, | Performed by: UROLOGY

## 2022-12-19 PROCEDURE — 99999 PR PBB SHADOW E&M-EST. PATIENT-LVL III: ICD-10-PCS | Mod: PBBFAC,,, | Performed by: STUDENT IN AN ORGANIZED HEALTH CARE EDUCATION/TRAINING PROGRAM

## 2022-12-19 PROCEDURE — 99999 PR PBB SHADOW E&M-EST. PATIENT-LVL IV: CPT | Mod: PBBFAC,,, | Performed by: UROLOGY

## 2022-12-19 PROCEDURE — 99204 OFFICE O/P NEW MOD 45 MIN: CPT | Mod: S$GLB,,, | Performed by: UROLOGY

## 2022-12-19 PROCEDURE — 1101F PT FALLS ASSESS-DOCD LE1/YR: CPT | Mod: CPTII,S$GLB,, | Performed by: UROLOGY

## 2022-12-19 PROCEDURE — 1159F PR MEDICATION LIST DOCUMENTED IN MEDICAL RECORD: ICD-10-PCS | Mod: CPTII,S$GLB,, | Performed by: STUDENT IN AN ORGANIZED HEALTH CARE EDUCATION/TRAINING PROGRAM

## 2022-12-19 PROCEDURE — 1125F PR PAIN SEVERITY QUANTIFIED, PAIN PRESENT: ICD-10-PCS | Mod: CPTII,S$GLB,, | Performed by: UROLOGY

## 2022-12-19 PROCEDURE — 3074F PR MOST RECENT SYSTOLIC BLOOD PRESSURE < 130 MM HG: ICD-10-PCS | Mod: CPTII,S$GLB,, | Performed by: STUDENT IN AN ORGANIZED HEALTH CARE EDUCATION/TRAINING PROGRAM

## 2022-12-19 PROCEDURE — 99999 PR PBB SHADOW E&M-EST. PATIENT-LVL III: CPT | Mod: PBBFAC,,, | Performed by: STUDENT IN AN ORGANIZED HEALTH CARE EDUCATION/TRAINING PROGRAM

## 2022-12-19 PROCEDURE — 3078F DIAST BP <80 MM HG: CPT | Mod: CPTII,S$GLB,, | Performed by: UROLOGY

## 2022-12-19 PROCEDURE — 99214 PR OFFICE/OUTPT VISIT, EST, LEVL IV, 30-39 MIN: ICD-10-PCS | Mod: S$GLB,,, | Performed by: STUDENT IN AN ORGANIZED HEALTH CARE EDUCATION/TRAINING PROGRAM

## 2022-12-19 PROCEDURE — 3008F BODY MASS INDEX DOCD: CPT | Mod: CPTII,S$GLB,, | Performed by: UROLOGY

## 2022-12-19 PROCEDURE — 3288F FALL RISK ASSESSMENT DOCD: CPT | Mod: CPTII,S$GLB,, | Performed by: STUDENT IN AN ORGANIZED HEALTH CARE EDUCATION/TRAINING PROGRAM

## 2022-12-19 PROCEDURE — 99214 OFFICE O/P EST MOD 30 MIN: CPT | Mod: S$GLB,,, | Performed by: STUDENT IN AN ORGANIZED HEALTH CARE EDUCATION/TRAINING PROGRAM

## 2022-12-19 PROCEDURE — 3074F SYST BP LT 130 MM HG: CPT | Mod: CPTII,S$GLB,, | Performed by: STUDENT IN AN ORGANIZED HEALTH CARE EDUCATION/TRAINING PROGRAM

## 2022-12-19 PROCEDURE — 3008F BODY MASS INDEX DOCD: CPT | Mod: CPTII,S$GLB,, | Performed by: STUDENT IN AN ORGANIZED HEALTH CARE EDUCATION/TRAINING PROGRAM

## 2022-12-19 PROCEDURE — 3075F PR MOST RECENT SYSTOLIC BLOOD PRESS GE 130-139MM HG: ICD-10-PCS | Mod: CPTII,S$GLB,, | Performed by: UROLOGY

## 2022-12-19 PROCEDURE — 3078F DIAST BP <80 MM HG: CPT | Mod: CPTII,S$GLB,, | Performed by: STUDENT IN AN ORGANIZED HEALTH CARE EDUCATION/TRAINING PROGRAM

## 2022-12-19 PROCEDURE — 99204 PR OFFICE/OUTPT VISIT, NEW, LEVL IV, 45-59 MIN: ICD-10-PCS | Mod: S$GLB,,, | Performed by: UROLOGY

## 2022-12-19 PROCEDURE — 3008F PR BODY MASS INDEX (BMI) DOCUMENTED: ICD-10-PCS | Mod: CPTII,S$GLB,, | Performed by: UROLOGY

## 2022-12-19 PROCEDURE — 3078F PR MOST RECENT DIASTOLIC BLOOD PRESSURE < 80 MM HG: ICD-10-PCS | Mod: CPTII,S$GLB,, | Performed by: STUDENT IN AN ORGANIZED HEALTH CARE EDUCATION/TRAINING PROGRAM

## 2022-12-19 PROCEDURE — 3008F PR BODY MASS INDEX (BMI) DOCUMENTED: ICD-10-PCS | Mod: CPTII,S$GLB,, | Performed by: STUDENT IN AN ORGANIZED HEALTH CARE EDUCATION/TRAINING PROGRAM

## 2022-12-19 RX ORDER — BUDESONIDE, GLYCOPYRROLATE, AND FORMOTEROL FUMARATE 160; 9; 4.8 UG/1; UG/1; UG/1
2 AEROSOL, METERED RESPIRATORY (INHALATION) 2 TIMES DAILY
COMMUNITY
Start: 2022-11-11 | End: 2023-01-31 | Stop reason: SDUPTHER

## 2022-12-19 RX ORDER — FLUTICASONE PROPIONATE 110 UG/1
1 AEROSOL, METERED RESPIRATORY (INHALATION) 2 TIMES DAILY
COMMUNITY
Start: 2022-04-08

## 2022-12-19 RX ORDER — PANTOPRAZOLE SODIUM 40 MG/1
40 TABLET, DELAYED RELEASE ORAL
COMMUNITY
Start: 2022-09-19

## 2022-12-19 RX ORDER — SENNOSIDES 8.6 MG/1
8.6 TABLET ORAL
COMMUNITY
Start: 2022-04-27

## 2022-12-19 RX ORDER — CIPROFLOXACIN 500 MG/1
500 TABLET ORAL 2 TIMES DAILY
Qty: 6 TABLET | Refills: 0 | Status: SHIPPED | OUTPATIENT
Start: 2022-12-19 | End: 2023-01-06

## 2022-12-19 NOTE — PROGRESS NOTES
Patient ID: Avni Reyes is a 67 y.o. male.    Chief Complaint: Hernia      HPI:  HPI  67M with left groin bulge for about a year. Was seen by surgery before. Hx of COPD and hernia was not bothersome so it was not recommended to undergo repair. Now with pain, says he cant sleep. Worse with activity. Bulge is larger. Had BM only about twice per week, occasional nausea.   Denies right sided symptoms.   Says hernia will go back in when he relaxes at home but comes right back.   Known smoker, still smokes. COPD on multiple inhalers that he uses as needed. Has oxygen at home that he says he uses when he feels like he needs to. Occasional SOB with activity. Walking with cane today.   On flomax for difficulty urinating. Scheduled for prostate biopsy next month.   No hx of abdominal surgery.       Review of Systems   Constitutional:  Negative for chills, diaphoresis and fever.   HENT:  Negative for trouble swallowing.    Respiratory:  Negative for cough, shortness of breath, wheezing and stridor.    Cardiovascular:  Negative for chest pain and palpitations.   Gastrointestinal:  Positive for abdominal pain. Negative for abdominal distention, blood in stool, diarrhea, nausea and vomiting.   Endocrine: Negative for cold intolerance and heat intolerance.   Genitourinary:  Negative for difficulty urinating.   Musculoskeletal:  Negative for back pain.   Skin:  Negative for rash.   Allergic/Immunologic: Negative for immunocompromised state.   Neurological:  Negative for dizziness, syncope and numbness.   Hematological:  Negative for adenopathy.   Psychiatric/Behavioral:  Negative for agitation.      Current Outpatient Medications   Medication Sig Dispense Refill    albuterol (PROVENTIL HFA) 90 mcg/actuation inhaler Inhale 2 puffs into the lungs every 6 (six) hours as needed for Wheezing. Rescue 6.7 g 6    albuterol-ipratropium (DUO-NEB) 2.5 mg-0.5 mg/3 mL nebulizer solution Take 3 mLs by nebulization every 6 (six) hours while  awake. Rescue 90 mL 0    atorvastatin (LIPITOR) 40 MG tablet Take 1 tablet (40 mg total) by mouth once daily. 90 tablet 3    BREZTRI AEROSPHERE 160-9-4.8 mcg/actuation HFAA Inhale 2 puffs into the lungs 2 (two) times daily.      ciprofloxacin HCl (CIPRO) 500 MG tablet Take 1 tablet (500 mg total) by mouth 2 (two) times daily. Please start medicine the day before your biopsy and take the day before, the day of, and the day after for 3 days 6 tablet 0    fluticasone propionate (FLOVENT HFA) 110 mcg/actuation inhaler Inhale 1 puff into the lungs 2 (two) times daily.      fluticasone-umeclidin-vilanter (TRELEGY ELLIPTA) 100-62.5-25 mcg DsDv Inhale 1 puff into the lungs once daily. 60 each 3    ipratropium-albuteroL (COMBIVENT RESPIMAT)  mcg/actuation inhaler Inhale 1 puff by mouth four times daily 4 g 11    omeprazole (PRILOSEC) 20 MG capsule Take 1 capsule (20 mg total) by mouth once daily. 30 capsule 12    pantoprazole (PROTONIX) 40 MG tablet Take 40 mg by mouth.      senna (SENOKOT) 8.6 mg tablet Take 8.6 mg by mouth.      tamsulosin (FLOMAX) 0.4 mg Cap Take 1 capsule (0.4 mg total) by mouth once daily. 90 capsule 3     No current facility-administered medications for this visit.       Review of patient's allergies indicates:  No Known Allergies    Past Medical History:   Diagnosis Date    COPD (chronic obstructive pulmonary disease)     Hyperlipidemia     Hypertension        Past Surgical History:   Procedure Laterality Date    COLONOSCOPY      2009. Unknown results but pathology samples taken per patient.     LEG SURGERY      seconndary to [facility]- Right leg       Family History   Problem Relation Age of Onset    Hyperlipidemia Mother     Heart disease Mother     Heart disease Father     Hyperlipidemia Father     Diabetes Father        Social History     Socioeconomic History    Marital status: Single   Tobacco Use    Smoking status: Some Days     Packs/day: 1.50     Years: 39.00     Pack years: 58.50     Types:  Cigarettes     Start date: 12/16/1967    Smokeless tobacco: Never   Substance and Sexual Activity    Alcohol use: Yes     Comment: social    Drug use: No    Sexual activity: Yes     Partners: Female   Social History Narrative    Living in [place] at least part of the time.     In relationship with female partner Parisa       Vitals:    12/19/22 1045   BP: 116/69   Pulse: (!) 115       Physical Exam  Constitutional:       General: He is not in acute distress.  HENT:      Head: Normocephalic and atraumatic.   Eyes:      General: No scleral icterus.  Cardiovascular:      Rate and Rhythm: Normal rate.   Pulmonary:      Effort: Pulmonary effort is normal. No respiratory distress.      Breath sounds: No stridor.   Abdominal:      Palpations: Abdomen is soft.      Tenderness: There is no abdominal tenderness.      Comments: Large LIH  No obvious RIH   Lymphadenopathy:      Cervical: No cervical adenopathy.   Skin:     General: Skin is warm.      Findings: No erythema.   Neurological:      Mental Status: He is alert and oriented to person, place, and time.   Psychiatric:         Behavior: Behavior normal.   Body mass index is 18.88 kg/m².    Hg 12  PSA 27  Hga1c 5.5 last year  No cscope      Assessment & Plan:   67M with large LIH  Would likely be safe to repair open under local anesthesia, MAC. Would not do general anesthesia  Has schedule prostate biopsy and appointment with pulmonology next month. Will let him follow up with them and then schedule surgery for hernia  Fiber

## 2022-12-19 NOTE — PROGRESS NOTES
[place] - Urology   Clinic Note    SUBJECTIVE:     Chief Complaint   Patient presents with    Elevated PSA       Referral from: Ishaan Mcdaniel MD.    History of Present Illness:  Avni Reyes is a 67 y.o. male who presents to clinic for evaluation of elevated PSA.      Patient denies any history of previously elevated PSA.  Patient was unaware that his PSA was elevated and is up trending.  Denies any family history of prostate cancer.  Denies any voiding dysfunction.  Does not take any blood thinners.    PSA:  Lab Results   Component Value Date    PSA 27.6 (H) 11/21/2022    PSA 25.0 (H) 10/19/2021    PSA 14.8 (H) 03/12/2019       Previously abnormal PSA: yes.   Previous biopsy: no.   Family history of prostate cancer: no.      Patient endorses no additional complaints at this time.    Anticoagulation/Antiplatelets:  No    Past Medical History:   Diagnosis Date    COPD (chronic obstructive pulmonary disease)     Hyperlipidemia     Hypertension        Past Surgical History:   Procedure Laterality Date    COLONOSCOPY      2009. Unknown results but pathology samples taken per patient.     LEG SURGERY      seconndary to [facility]- Right leg       Family History   Problem Relation Age of Onset    Hyperlipidemia Mother     Heart disease Mother     Heart disease Father     Hyperlipidemia Father     Diabetes Father        Social History     Tobacco Use    Smoking status: Some Days     Packs/day: 1.50     Years: 39.00     Pack years: 58.50     Types: Cigarettes     Start date: 12/16/1967    Smokeless tobacco: Never   Substance Use Topics    Alcohol use: Yes     Comment: social    Drug use: No       Current Outpatient Medications on File Prior to Visit   Medication Sig Dispense Refill    albuterol-ipratropium (DUO-NEB) 2.5 mg-0.5 mg/3 mL nebulizer solution Take 3 mLs by nebulization every 6 (six) hours while awake. Rescue 90 mL 0    atorvastatin (LIPITOR) 40 MG tablet Take 1 tablet (40 mg total) by mouth once daily. 90 tablet 3     "fluticasone-umeclidin-vilanter (TRELEGY ELLIPTA) 100-62.5-25 mcg DsDv Inhale 1 puff into the lungs once daily. 60 each 3    ipratropium-albuteroL (COMBIVENT RESPIMAT)  mcg/actuation inhaler Inhale 1 puff by mouth four times daily 4 g 11    omeprazole (PRILOSEC) 20 MG capsule Take 1 capsule (20 mg total) by mouth once daily. 30 capsule 12    tamsulosin (FLOMAX) 0.4 mg Cap Take 1 capsule (0.4 mg total) by mouth once daily. 90 capsule 3    albuterol (PROVENTIL HFA) 90 mcg/actuation inhaler Inhale 2 puffs into the lungs every 6 (six) hours as needed for Wheezing. Rescue 6.7 g 6    BREZTRI AEROSPHERE 160-9-4.8 mcg/actuation HFAA Inhale 2 puffs into the lungs 2 (two) times daily.      fluticasone propionate (FLOVENT HFA) 110 mcg/actuation inhaler Inhale 1 puff into the lungs 2 (two) times daily.      pantoprazole (PROTONIX) 40 MG tablet Take 40 mg by mouth.      senna (SENOKOT) 8.6 mg tablet Take 8.6 mg by mouth.      [DISCONTINUED] tiotropium (SPIRIVA WITH HANDIHALER) 18 mcg inhalation capsule Inhale contents 1 capsule (18 mcg total) via handihaler into the lungs once daily. Controller do not swallow 360 capsule 0     No current facility-administered medications on file prior to visit.       Review of patient's allergies indicates:  No Known Allergies    Review of Systems:  A review of 10+ systems was conducted with pertinent positive and negative findings documented in HPI with all other systems reviewed and negative.    OBJECTIVE:     Estimated body mass index is 18.86 kg/m² as calculated from the following:    Height as of this encounter: 6' 1" (1.854 m).    Weight as of this encounter: 64.8 kg (142 lb 15.5 oz).    Vital Signs (Most Recent)  Vitals:    12/19/22 0941   BP: (!) 137/44   Pulse: 110       Physical Exam:  GENERAL: patient sitting comfortably  HEENT: normocephalic  NECK: supple, no JVD  PULM: normal chest rise, no increased WOB  HEART: non-diaphoretic  ABDO: soft, nondistended, nontender  BACK: no CVA " tenderness bilaterally  SKIN: warm, dry, well perfused  EXT: no bruising or edema  NEURO: grossly normal with no focal deficits  PSYCH: appropriate mood and affect    Genitourinary Exam:  Will do INGRID at time of biopsy    Lab Results   Component Value Date    BUN 14 05/30/2022    CREATININE 1.0 11/21/2022    WBC 3.78 (L) 11/21/2022    HGB 12.5 (L) 11/21/2022    HCT 42.5 11/21/2022     11/21/2022    AST 16 05/26/2022    ALT 13 05/26/2022    ALKPHOS 47 (L) 05/26/2022    ALBUMIN 3.5 05/26/2022    HGBA1C 5.5 10/19/2021        PSA:  Lab Results   Component Value Date    PSA 27.6 (H) 11/21/2022    PSA 25.0 (H) 10/19/2021    PSA 14.8 (H) 03/12/2019       Testosterone:  No results found for: TOTALTESTOST, TOTALTESTOST, TESTOSTERONE     Imaging:  I have personally reviewed all relevant imaging.    No results found for this or any previous visit (from the past 2160 hour(s)).  No results found for this or any previous visit (from the past 2160 hour(s)).  CT Chest Without Contrast  Narrative: EXAMINATION:  CT CHEST WITHOUT CONTRAST    CLINICAL HISTORY:  Dyspnea, chronic, unclear etiology;    TECHNIQUE:  Low dose axial images, sagittal and coronal reformations were obtained from the thoracic inlet to the lung bases. Contrast was not administered.    COMPARISON:  CT scan 05/29/2022; chest radiograph 05/26/2022; CT chest 11/08/2021    FINDINGS:  LINES/TUBES:  None.    SOFT TISSUES: No axillary or subpectoral lymphadenopathy. The visualized thyroid gland is unremarkable.    HEART AND MEDIASTINUM: No mediastinal or hilar lymphadenopathy. Heart and pericardium are within normal limits. Scattered calcifications of the coronary arteries and thoracic aorta.  The main pulmonary artery is not enlarged.    PLEURA: No pleural effusion or pneumothorax.    LUNGS AND AIRWAYS: Aerated secretions in the trachea.  Mild lower lobe predominant bronchial wall thickening.  Severe upper lobe predominant panlobular emphysema.  No focal  consolidation.  Bibasilar subsegmental atelectasis and/or scarring.  Scattered calcified granulomas.  Scattered solid pulmonary nodules, the largest measures 4 mm in the right lung apex on 302:54.  These are unchanged from 11/08/2021.  Some of the previous nodules are less conspicuous.  No new or enlarging pulmonary nodules.    ABDOMEN: Bilateral renal cysts.  No other abnormality in the visualized upper abdomen.    BONES: Degenerative changes.  No acute fractures. No suspicious lytic or sclerotic lesions.  Impression: Severe panlobular emphysema with chronic bronchitis.  No focal consolidations.    Unchanged pulmonary nodules measuring up to 4 mm.  Attention on follow-up lung cancer screening CT is recommended.    Electronically signed by: Linus Eaton  Date:    05/29/2022  Time:    14:27  NM Lung Scan Perfusion Particulate  Narrative: EXAMINATION:  NM LUNG SCAN PERFUSION    CLINICAL HISTORY:  Pulmonary embolism (PE) suspected, positive D-dimer;pe;    TECHNIQUE:  Following the IV administration of 5.5 mCi of Tc-99m-MAA, multiple images of the thorax were obtained in various projections.  Ventilation images were not performed due to COVID-19 aerosol precautions.    COMPARISON:  Prior thoracic CT dated 11/08/2021 and prior chest radiograph from 05/06/2022    FINDINGS:  Perfusion: Nonsegmental perfusion defects are present in both lungs with an upper lung zone predominance, corresponding to areas of severe centrilobular emphysema evident on previous CT.  Impression: This represents a low suspicion for pulmonary embolism.    Electronically signed by: Lisseth Perrin MD  Date:    05/29/2022  Time:    11:26      ASSESSMENT     1. Elevated PSA        PLAN:     Elevated PSA    - PSA values were discussed. PSA is a protein made by the prostate in both benign and malignant conditions. I discussed with the finding of an abnormal PSA test or rising PSA level. We discussed about the benefits and limitations of PSA  testing/screening.    - We discussed the most common differential diagnosis of an elevated PSA including BPH, prostatitis (chronic and acute), and prostate cancer. Other common benign reasons for elevated PSA include infection, recent instrumentation, ejaculation, and trauma.     -  We also discussed next steps, including repeating the PSA, proceeding to prostate needle biopsy, as well as the utility of a multi-parametric prostate MRI.    - After our discussion, we decided to proceed with transrectal ultrasound with prostate needle biopsy. Will also order PSMA PET.  We will prescribe 3 days of ciprofloxacin prophylaxis    Adilson Fields MD  Urology  [facility]    Disclaimer: This note has been generated using voice-recognition software. There may be typographical errors that have been missed during proof-reading.

## 2022-12-20 ENCOUNTER — TELEPHONE (OUTPATIENT)
Dept: UROLOGY | Facility: CLINIC | Age: 67
End: 2022-12-20
Payer: MEDICARE

## 2022-12-20 DIAGNOSIS — E78.5 HYPERLIPIDEMIA, UNSPECIFIED HYPERLIPIDEMIA TYPE: ICD-10-CM

## 2022-12-20 NOTE — TELEPHONE ENCOUNTER
Left voicemail for patient stating that I have rescheduled his biopsy appointment with Dr. Fields to 2/6/2023 at 10am. I advised to patient that if time or date did not work for him to please give me a call back.

## 2022-12-20 NOTE — TELEPHONE ENCOUNTER
Patient called in to verify new appointment date and time. I went over new appointment time and date with patient and he voiced his understanding.

## 2022-12-20 NOTE — TELEPHONE ENCOUNTER
----- Message from Jeanne Pastor sent at 12/19/2022  3:58 PM CST -----  Pt is requesting to have biopsy appt rescheduled. He states he was informed that he had to see his pulm provider before any other appt, so he is trying to reschedule biopsy for anytime after 1/31.     Confirmed contact below:  Contact Name:Avni Reyes  Phone Number: [phone]

## 2022-12-21 RX ORDER — ATORVASTATIN CALCIUM 40 MG/1
40 TABLET, FILM COATED ORAL DAILY
Qty: 90 TABLET | Refills: 3 | Status: SHIPPED | OUTPATIENT
Start: 2022-12-21 | End: 2023-12-21

## 2022-12-22 ENCOUNTER — TELEPHONE (OUTPATIENT)
Dept: INTERNAL MEDICINE | Facility: CLINIC | Age: 67
End: 2022-12-22
Payer: MEDICARE

## 2022-12-22 NOTE — TELEPHONE ENCOUNTER
----- Message from Amanuel De Luna sent at 12/22/2022 11:44 AM CST -----  Contact: Pt [phone]  Pt called in requesting to speak with the pcp he has a question and concern please advise

## 2022-12-22 NOTE — TELEPHONE ENCOUNTER
Called to let you know that he is following   Up tried to set up an appointment in resident clinic or with Staff   Due to insurance unable to make an appointment  [facility]   Explained will need to call and see where he can see a primary doctor

## 2022-12-27 ENCOUNTER — TELEPHONE (OUTPATIENT)
Dept: INTERNAL MEDICINE | Facility: CLINIC | Age: 67
End: 2022-12-27
Payer: MEDICARE

## 2022-12-27 DIAGNOSIS — D50.9 IRON DEFICIENCY ANEMIA, UNSPECIFIED IRON DEFICIENCY ANEMIA TYPE: Primary | ICD-10-CM

## 2022-12-27 NOTE — TELEPHONE ENCOUNTER
Called and discussed test results and recommendations with the pt. The pt expressed understanding.     Pt will keep appt with Gastro

## 2022-12-27 NOTE — TELEPHONE ENCOUNTER
Please call patient and explain that the tests show anemis.    I would like to refer patient to the gastroenterology department for further evaluation and treatment.    Suhas has an appointment in early feb with GI.    Please call patient and ensure that he will keep that appointment.     Thank you.

## 2022-12-27 NOTE — TELEPHONE ENCOUNTER
----- Message from Blanka Rollins sent at 12/27/2022 11:15 AM CST -----  Contact: [phone] Patient  Patient is returning a phone call.  Who left a message for the patient: Mindy Bolanos LPN  Does patient know what this is regarding:  results  Would you like a call back, or a response through your [facility] portal?:   call back  Comments:

## 2023-01-24 ENCOUNTER — TELEPHONE (OUTPATIENT)
Dept: ENDOSCOPY | Facility: HOSPITAL | Age: 68
End: 2023-01-24
Payer: MEDICARE

## 2023-01-31 ENCOUNTER — OFFICE VISIT (OUTPATIENT)
Dept: PULMONOLOGY | Facility: CLINIC | Age: 68
End: 2023-01-31
Payer: MEDICARE

## 2023-01-31 VITALS
DIASTOLIC BLOOD PRESSURE: 78 MMHG | HEIGHT: 73 IN | BODY MASS INDEX: 19.4 KG/M2 | SYSTOLIC BLOOD PRESSURE: 120 MMHG | WEIGHT: 146.38 LBS

## 2023-01-31 DIAGNOSIS — J44.9 CHRONIC OBSTRUCTIVE PULMONARY DISEASE, UNSPECIFIED COPD TYPE: Primary | ICD-10-CM

## 2023-01-31 DIAGNOSIS — J84.10 CALCIFIED GRANULOMA OF LUNG: ICD-10-CM

## 2023-01-31 DIAGNOSIS — K41.91 UNILATERAL RECURRENT FEMORAL HERNIA WITHOUT OBSTRUCTION OR GANGRENE: ICD-10-CM

## 2023-01-31 DIAGNOSIS — R91.8 PULMONARY NODULES: ICD-10-CM

## 2023-01-31 DIAGNOSIS — Z72.0 TOBACCO USER: ICD-10-CM

## 2023-01-31 PROBLEM — K41.90 UNILATERAL FEMORAL HERNIA: Status: ACTIVE | Noted: 2023-01-31

## 2023-01-31 PROCEDURE — 99999 PR PBB SHADOW E&M-EST. PATIENT-LVL III: ICD-10-PCS | Mod: PBBFAC,,, | Performed by: STUDENT IN AN ORGANIZED HEALTH CARE EDUCATION/TRAINING PROGRAM

## 2023-01-31 PROCEDURE — 99204 OFFICE O/P NEW MOD 45 MIN: CPT | Mod: S$GLB,,, | Performed by: STUDENT IN AN ORGANIZED HEALTH CARE EDUCATION/TRAINING PROGRAM

## 2023-01-31 PROCEDURE — 3074F SYST BP LT 130 MM HG: CPT | Mod: CPTII,S$GLB,, | Performed by: STUDENT IN AN ORGANIZED HEALTH CARE EDUCATION/TRAINING PROGRAM

## 2023-01-31 PROCEDURE — 1101F PT FALLS ASSESS-DOCD LE1/YR: CPT | Mod: CPTII,S$GLB,, | Performed by: STUDENT IN AN ORGANIZED HEALTH CARE EDUCATION/TRAINING PROGRAM

## 2023-01-31 PROCEDURE — 1159F PR MEDICATION LIST DOCUMENTED IN MEDICAL RECORD: ICD-10-PCS | Mod: CPTII,S$GLB,, | Performed by: STUDENT IN AN ORGANIZED HEALTH CARE EDUCATION/TRAINING PROGRAM

## 2023-01-31 PROCEDURE — 3288F PR FALLS RISK ASSESSMENT DOCUMENTED: ICD-10-PCS | Mod: CPTII,S$GLB,, | Performed by: STUDENT IN AN ORGANIZED HEALTH CARE EDUCATION/TRAINING PROGRAM

## 2023-01-31 PROCEDURE — 1126F AMNT PAIN NOTED NONE PRSNT: CPT | Mod: CPTII,S$GLB,, | Performed by: STUDENT IN AN ORGANIZED HEALTH CARE EDUCATION/TRAINING PROGRAM

## 2023-01-31 PROCEDURE — 3078F PR MOST RECENT DIASTOLIC BLOOD PRESSURE < 80 MM HG: ICD-10-PCS | Mod: CPTII,S$GLB,, | Performed by: STUDENT IN AN ORGANIZED HEALTH CARE EDUCATION/TRAINING PROGRAM

## 2023-01-31 PROCEDURE — 3074F PR MOST RECENT SYSTOLIC BLOOD PRESSURE < 130 MM HG: ICD-10-PCS | Mod: CPTII,S$GLB,, | Performed by: STUDENT IN AN ORGANIZED HEALTH CARE EDUCATION/TRAINING PROGRAM

## 2023-01-31 PROCEDURE — 1159F MED LIST DOCD IN RCRD: CPT | Mod: CPTII,S$GLB,, | Performed by: STUDENT IN AN ORGANIZED HEALTH CARE EDUCATION/TRAINING PROGRAM

## 2023-01-31 PROCEDURE — 3008F PR BODY MASS INDEX (BMI) DOCUMENTED: ICD-10-PCS | Mod: CPTII,S$GLB,, | Performed by: STUDENT IN AN ORGANIZED HEALTH CARE EDUCATION/TRAINING PROGRAM

## 2023-01-31 PROCEDURE — 3078F DIAST BP <80 MM HG: CPT | Mod: CPTII,S$GLB,, | Performed by: STUDENT IN AN ORGANIZED HEALTH CARE EDUCATION/TRAINING PROGRAM

## 2023-01-31 PROCEDURE — 99204 PR OFFICE/OUTPT VISIT, NEW, LEVL IV, 45-59 MIN: ICD-10-PCS | Mod: S$GLB,,, | Performed by: STUDENT IN AN ORGANIZED HEALTH CARE EDUCATION/TRAINING PROGRAM

## 2023-01-31 PROCEDURE — 3008F BODY MASS INDEX DOCD: CPT | Mod: CPTII,S$GLB,, | Performed by: STUDENT IN AN ORGANIZED HEALTH CARE EDUCATION/TRAINING PROGRAM

## 2023-01-31 PROCEDURE — 1101F PR PT FALLS ASSESS DOC 0-1 FALLS W/OUT INJ PAST YR: ICD-10-PCS | Mod: CPTII,S$GLB,, | Performed by: STUDENT IN AN ORGANIZED HEALTH CARE EDUCATION/TRAINING PROGRAM

## 2023-01-31 PROCEDURE — 99999 PR PBB SHADOW E&M-EST. PATIENT-LVL III: CPT | Mod: PBBFAC,,, | Performed by: STUDENT IN AN ORGANIZED HEALTH CARE EDUCATION/TRAINING PROGRAM

## 2023-01-31 PROCEDURE — 3288F FALL RISK ASSESSMENT DOCD: CPT | Mod: CPTII,S$GLB,, | Performed by: STUDENT IN AN ORGANIZED HEALTH CARE EDUCATION/TRAINING PROGRAM

## 2023-01-31 PROCEDURE — 1126F PR PAIN SEVERITY QUANTIFIED, NO PAIN PRESENT: ICD-10-PCS | Mod: CPTII,S$GLB,, | Performed by: STUDENT IN AN ORGANIZED HEALTH CARE EDUCATION/TRAINING PROGRAM

## 2023-01-31 RX ORDER — IPRATROPIUM BROMIDE AND ALBUTEROL SULFATE 2.5; .5 MG/3ML; MG/3ML
3 SOLUTION RESPIRATORY (INHALATION)
Qty: 90 ML | Refills: 0 | Status: CANCELLED | OUTPATIENT
Start: 2023-01-31 | End: 2024-01-31

## 2023-01-31 NOTE — PROGRESS NOTES
"Subjective:     Reason for visit:  Establishment of care for COPD    Patient ID:  Avni Reyes is a 67 y.o. male with very severe COPD, HTN, hyperlipidemia    Interval History:  Presents today for establishment of care for his COPD.  One hospitalization over the past year for COPD exacerbation.  Able to ambulate about 100 ft before becoming symptomatic.  Reports significant decline in the past 3 years following USP from [employer].  He complains about significant discomfort related to his hernia and feels like it is limiting his physical activity.  No chest pain or palpitations and shortness of breath resolves with rest.  Despite needing oxygen when he ambulates, he did not bring it to clinic.  Endorses a chronic bronchitic cough    Ambulates with cane.  Independent ADLs.  FC 3-4    Additional Pulmonary History:  Childhood Illnesses:  None  Occupational History:  [employer]  Tobacco/Smoking History:  Current everyday smoker at about 3 cigarettes per day.  > 40 pack year history    Review of Systems   Constitutional:  Positive for malaise/fatigue. Negative for chills, fever and weight loss.   Respiratory:  Positive for cough, sputum production, shortness of breath and wheezing.    Cardiovascular:  Negative for chest pain and palpitations.   Gastrointestinal:  Positive for abdominal pain and nausea. Negative for heartburn and vomiting.      Objective:     Vitals:    01/31/23 1335   BP: 120/78   BP Location: Right arm   Patient Position: Sitting   BP Method: Medium (Manual)   Weight: 66.4 kg (146 lb 6.2 oz)   Height: 6' 1" (1.854 m)         Physical Exam  Vitals and nursing note reviewed.   Constitutional:       General: He is not in acute distress.     Appearance: He is not ill-appearing, toxic-appearing or diaphoretic.   HENT:      Head: Normocephalic and atraumatic.      Nose: No congestion or rhinorrhea.      Mouth/Throat:      Mouth: Mucous membranes are moist.      Pharynx: No oropharyngeal exudate or " posterior oropharyngeal erythema.   Eyes:      General: No scleral icterus.     Extraocular Movements: Extraocular movements intact.   Cardiovascular:      Rate and Rhythm: Regular rhythm. Tachycardia present.      Heart sounds: No murmur heard.  Pulmonary:      Effort: No tachypnea, accessory muscle usage, respiratory distress or retractions.      Breath sounds: No decreased breath sounds, wheezing, rhonchi or rales.   Abdominal:      General: There is no distension.      Palpations: Abdomen is soft.      Tenderness: There is no abdominal tenderness. There is no guarding.   Musculoskeletal:      Right lower leg: No edema.      Left lower leg: No edema.   Skin:     General: Skin is warm and dry.      Coloration: Skin is not jaundiced.      Findings: No rash.   Neurological:      General: No focal deficit present.      Mental Status: Mental status is at baseline.        Personal Diagnostic Review and Interpretation  05/29/2022 CT chest: Severe upper lobe panlobular emphysema with bibasilar atelectasis, scattered calcified granulomas scattered subcentimeter nodules which appear stable    05/29/2022 V/Q scan:  Nonsegmental perfusion defects in the apices of both lungs which correlate with severe emphysematous changes    11/08/2021 CT chest lung screening:  Aortic atherosclerosis with scattered subcentimeter nodules, calcified granulomas, bibasilar atelectasis and severe upper lobe emphysema      Pertinent Studies Reviewed and Interpreted:     Pulmonary Function Tests:   04/10/2019:  FEV1 25, FVC 53, FEF 25-75 11.  DLCO 52    6 Minute Walk Tests:   None    Echocardiograms:   None    Assessment:     1. Chronic obstructive pulmonary disease, unspecified COPD type  Ambulatory referral/consult to Pulmonology    Stress test, pulmonary    DLCO-Carbon Monoxide Diffusing Capacity    Lung Volumes    Spirometry with/without bronchodilator      2. Tobacco user        3. Calcified granuloma of lung        4. Pulmonary nodules         5. Unilateral recurrent femoral hernia without obstruction or gangrene            Current Outpatient Medications   Medication Instructions    albuterol (PROVENTIL HFA) 90 mcg/actuation inhaler 2 puffs, Inhalation, Every 6 hours PRN, Rescue    albuterol-ipratropium (DUO-NEB) 2.5 mg-0.5 mg/3 mL nebulizer solution 3 mLs, Nebulization, Every 6 hours while awake, Rescue    atorvastatin (LIPITOR) 40 mg, Oral, Daily    fluticasone propionate (FLOVENT HFA) 110 mcg/actuation inhaler 1 puff, Inhalation, 2 times daily    fluticasone-umeclidin-vilanter (TRELEGY ELLIPTA) 100-62.5-25 mcg DsDv 1 puff, Inhalation, Daily    omeprazole (PRILOSEC) 20 mg, Oral, Daily    pantoprazole (PROTONIX) 40 mg, Oral    senna (SENOKOT) 8.6 mg, Oral    tamsulosin (FLOMAX) 0.4 mg, Oral, Daily      Avni Reyes had no medications administered during this visit.     Orders Placed This Encounter   Procedures    Stress test, pulmonary     Standing Status:   Future     Standing Expiration Date:   1/31/2024     Order Specific Question:   Reason for study     Answer:   Functional status     Order Specific Question:   Release to patient     Answer:   Immediate    DLCO-Carbon Monoxide Diffusing Capacity     Standing Status:   Future     Standing Expiration Date:   1/31/2024     Order Specific Question:   Release to patient     Answer:   Immediate    Lung Volumes     Standing Status:   Future     Standing Expiration Date:   1/31/2024     Order Specific Question:   Release to patient     Answer:   Immediate    Spirometry with/without bronchodilator     Standing Status:   Future     Standing Expiration Date:   1/31/2024     Order Specific Question:   Release to patient     Answer:   Immediate      Plan:       Problem List Items Addressed This Visit          Pulmonary    COPD, GOLD 4D - Primary    Overview     2019 PFTs with FEV1 25% and DLCO 52%.  CT imaging with severe upper lobe emphysema.  Currently on Trelegy, Combivent, albuterol and DuoNeb.  Counseled  extensively on the need for smoking cessation.  Given the duplicate LAMA therapy, discontinuing Combivent as it is not rescue therapy.  Can continue albuterol HFA PRN and duo nebs PRN with the Trelegy for LABA/LAMA/ICS.  No PFTs for several years, so complete PFTs ordered with a 6 MWT.      Currently prescribed supplemental oxygen while ambulating.  Based on 2017 SCALES, inpatients with moderate exercise induced desaturation, which is what this appears to be, supplemental oxygen did not affect time to death, rates of all hospitalization, quality of life, lung function or 6 MWT distance.  We did however discussed the dangers of hypoxemia         Relevant Orders    Stress test, pulmonary    DLCO-Carbon Monoxide Diffusing Capacity    Lung Volumes    Spirometry with/without bronchodilator    Calcified granuloma of lung    Overview     Noted on serial CT.  Benign and need no further investigation         Pulmonary nodules    Overview     Scattered subcentimeter nodules noted on serial CT chest which appear stable.  Continues to meet criteria for yearly screening            GI    Unilateral hernia    Overview     Following with surgery and planning for procedure as outpatient.  Unable to assess surgical risk given lack of PFTs in the past few years.  Prior to assessment, patient needs to complete PFTs and 6 MWT.  I discussed this with the patient and explained the reasons for holding off on risk assessment at this time            Other    Tobacco user    Overview     Current everyday smoker at 1/3 pack per day.  > 40 pack year history.  Referral to smoking cessation placed by PCP with plans for a prescription of Chantix.  Patient counseled extensively on the need for smoking cessation             45 minutes of total time spent on the encounter, which includes face-to-face time and non-face-to-face time preparing to see the patient (eg, review of tests), obtaining and/or reviewing separately obtained history, documenting  clinical information in the electronic or other health record, independently interpreting results (not separately reported), and communicating results to the patient/family/caregiver, or care coordination (not separately reported).     Portions of the record may have been created with voice-recognition software. Occasional wrong-word or sound-a-like substitutions may have occurred due to the inherent limitations of voice-recognition software. Read the chart carefully and recognize, using context, where substitutions have occurred.

## 2023-02-01 RX ORDER — IPRATROPIUM BROMIDE AND ALBUTEROL SULFATE 2.5; .5 MG/3ML; MG/3ML
3 SOLUTION RESPIRATORY (INHALATION) EVERY 6 HOURS PRN
Qty: 3240 ML | Refills: 0 | Status: SHIPPED | OUTPATIENT
Start: 2023-02-01 | End: 2024-02-01

## 2023-02-02 ENCOUNTER — HOSPITAL ENCOUNTER (OUTPATIENT)
Dept: PULMONOLOGY | Facility: CLINIC | Age: 68
Discharge: HOME | End: 2023-02-02
Payer: MEDICARE

## 2023-02-02 VITALS — HEIGHT: 73 IN | BODY MASS INDEX: 19.28 KG/M2 | WEIGHT: 145.5 LBS

## 2023-02-02 DIAGNOSIS — J44.9 CHRONIC OBSTRUCTIVE PULMONARY DISEASE, UNSPECIFIED COPD TYPE: ICD-10-CM

## 2023-02-02 DIAGNOSIS — J43.1 PANLOBULAR EMPHYSEMA: Primary | ICD-10-CM

## 2023-02-02 PROCEDURE — 94729 PR C02/MEMBANE DIFFUSE CAPACITY: ICD-10-PCS | Mod: S$GLB,,, | Performed by: INTERNAL MEDICINE

## 2023-02-02 PROCEDURE — 94727 GAS DIL/WSHOT DETER LNG VOL: CPT | Mod: 53,S$GLB,, | Performed by: INTERNAL MEDICINE

## 2023-02-02 PROCEDURE — 94060 EVALUATION OF WHEEZING: CPT | Mod: S$GLB,,, | Performed by: INTERNAL MEDICINE

## 2023-02-02 PROCEDURE — 94729 DIFFUSING CAPACITY: CPT | Mod: S$GLB,,, | Performed by: INTERNAL MEDICINE

## 2023-02-02 PROCEDURE — 94618 PULMONARY STRESS TESTING: ICD-10-PCS | Mod: S$GLB,,, | Performed by: INTERNAL MEDICINE

## 2023-02-02 PROCEDURE — 94727 PR PULM FUNCTION TEST BY GAS: ICD-10-PCS | Mod: 53,S$GLB,, | Performed by: INTERNAL MEDICINE

## 2023-02-02 PROCEDURE — 94618 PULMONARY STRESS TESTING: CPT | Mod: S$GLB,,, | Performed by: INTERNAL MEDICINE

## 2023-02-02 PROCEDURE — 94060 PR EVAL OF BRONCHOSPASM: ICD-10-PCS | Mod: S$GLB,,, | Performed by: INTERNAL MEDICINE

## 2023-02-02 NOTE — PROCEDURES
Avni Reyes is a 67 y.o.  male patient, who presents for a 6 minute walk test ordered by MD Laurita.  The diagnosis is COPD/Emphysema.  The patient's BMI is 19.2 kg/m2.  Predicted distance (lower limit of normal) is 407.37 meters.      Test Results:    The test was completed without stopping.  The total time walked was 360 seconds.  During walking, the patient reported:  Dyspnea.  The patient used supplemental oxygen and a wheelchair for assistance during testing.     02/02/2023---------Distance: 259.08 meters (850 feet)     O2 Sat % Supplemental Oxygen Heart Rate Blood Pressure Claudy Scale   Pre-exercise  (Resting) 95 % 2 L/M 110 bpm 126/71 mmHg 5-6   During Exercise 86 % 2 L/M 127 bpm 163/87 mmHg 7-8   Post-exercise  (Recovery) 98 % 2 L/M  115 bpm 136/77 mmHg      Recovery Time: 125 seconds    Performing nurse/tech: PEEWEE Palencia      PREVIOUS STUDY:   The patient has not had a previous study.      CLINICAL INTERPRETATION:  Six minute walk distance is 259.08 meters (850 feet) with very heavy dyspnea.  During exercise, there was significant desaturation while breathing supplemental oxygen.  Blood pressure increased significantly and Heart rate remained stable with walking.  Tachycardia was present prior to exercise.  The patient did not report non-pulmonary symptoms during exercise.  Significant exercise impairment is likely due to desaturation, cardiovascular causes, and subjective symptoms.  The patient did complete the study, walking 360 seconds of the 360 second test.  No previous study performed.  Based upon age and body mass index, exercise capacity is less than predicted.

## 2023-02-04 LAB
DLCO SINGLE BREATH LLN: 23.18
DLCO SINGLE BREATH PRE REF: 16.6 %
DLCO SINGLE BREATH REF: 30.11
DLCOC SBVA LLN: 2.82
DLCOC SBVA REF: 3.89
DLCOC SINGLE BREATH LLN: 23.18
DLCOC SINGLE BREATH REF: 30.11
DLCOCSBVAULN: 4.96
DLCOCSINGLEBREATHULN: 37.03
DLCOSINGLEBREATHULN: 37.03
DLCOVA LLN: 2.82
DLCOVA PRE REF: 38.1 %
DLCOVA PRE: 1.48 ML/(MIN*MMHG*L) (ref 2.82–4.96)
DLCOVA REF: 3.89
DLCOVAULN: 4.96
FEF 25 75 LLN: 0.88
FEF 25 75 PRE REF: 8.2 %
FEF 25 75 REF: 2.37
FET100 CHG: 0.5 %
FEV05 LLN: 1.82
FEV05 REF: 2.96
FEV1 CHG: -7.6 %
FEV1 FVC LLN: 64
FEV1 FVC PRE REF: 40.8 %
FEV1 FVC REF: 76
FEV1 LLN: 2.14
FEV1 PRE REF: 17.3 %
FEV1 REF: 3.08
FEV1 VOL CHG: -0.04
FVC CHG: -5.3 %
FVC LLN: 2.93
FVC PRE REF: 42.2 %
FVC REF: 4.04
FVC VOL CHG: -0.09
IVC PRE: 1.64 L (ref 2.93–5.17)
IVC SINGLE BREATH LLN: 2.93
IVC SINGLE BREATH PRE REF: 40.7 %
IVC SINGLE BREATH REF: 4.04
IVCSINGLEBREATHULN: 5.17
PEF LLN: 5.96
PEF PRE REF: 27.1 %
PEF REF: 8.85
PHYSICIAN COMMENT: ABNORMAL
POST FEF 25 75: 0.18 L/S (ref 0.88–4.59)
POST FET 100: 8.6 SEC
POST FEV1 FVC: 30.38 % (ref 63.74–87.52)
POST FEV1: 0.49 L (ref 2.14–3.94)
POST FEV5: 0.35 L (ref 1.82–4.09)
POST FVC: 1.61 L (ref 2.93–5.17)
POST PEF: 2.92 L/S (ref 5.96–11.73)
PRE DLCO: 5 ML/(MIN*MMHG) (ref 23.18–37.03)
PRE FEF 25 75: 0.2 L/S (ref 0.88–4.59)
PRE FET 100: 8.55 SEC
PRE FEV05 REF: 11.6 %
PRE FEV1 FVC: 31.14 % (ref 63.74–87.52)
PRE FEV1: 0.53 L (ref 2.14–3.94)
PRE FEV5: 0.34 L (ref 1.82–4.09)
PRE FVC: 1.71 L (ref 2.93–5.17)
PRE PEF: 2.4 L/S (ref 5.96–11.73)
VA PRE: 3.37 L (ref 7.59–7.59)
VA SINGLE BREATH LLN: 7.59
VA SINGLE BREATH PRE REF: 44.4 %
VA SINGLE BREATH REF: 7.59
VASINGLEBREATHULN: 7.59

## 2023-02-06 ENCOUNTER — PROCEDURE VISIT (OUTPATIENT)
Dept: UROLOGY | Facility: CLINIC | Age: 68
End: 2023-02-06
Payer: MEDICARE

## 2023-02-06 VITALS
SYSTOLIC BLOOD PRESSURE: 150 MMHG | HEART RATE: 126 BPM | HEIGHT: 73 IN | WEIGHT: 147.69 LBS | DIASTOLIC BLOOD PRESSURE: 84 MMHG | BODY MASS INDEX: 19.57 KG/M2

## 2023-02-06 DIAGNOSIS — R97.20 ELEVATED PSA: ICD-10-CM

## 2023-02-06 PROCEDURE — 88305 TISSUE EXAM BY PATHOLOGIST: CPT | Mod: 26,,, | Performed by: PATHOLOGY

## 2023-02-06 PROCEDURE — 99499 UNLISTED E&M SERVICE: CPT | Mod: S$GLB,,, | Performed by: UROLOGY

## 2023-02-06 PROCEDURE — 88305 TISSUE EXAM BY PATHOLOGIST: CPT | Mod: 59 | Performed by: PATHOLOGY

## 2023-02-06 PROCEDURE — 88341 PR IHC OR ICC EACH ADD'L SINGLE ANTIBODY  STAINPR: ICD-10-PCS | Mod: 26,,, | Performed by: PATHOLOGY

## 2023-02-06 PROCEDURE — 99499 NO LOS: ICD-10-PCS | Mod: S$GLB,,, | Performed by: UROLOGY

## 2023-02-06 PROCEDURE — 76872 PR US TRANSRECTAL: ICD-10-PCS | Mod: 26,S$GLB,, | Performed by: UROLOGY

## 2023-02-06 PROCEDURE — 88341 IMHCHEM/IMCYTCHM EA ADD ANTB: CPT | Mod: 26,,, | Performed by: PATHOLOGY

## 2023-02-06 PROCEDURE — 88342 IMHCHEM/IMCYTCHM 1ST ANTB: CPT | Performed by: PATHOLOGY

## 2023-02-06 PROCEDURE — 88342 IMHCHEM/IMCYTCHM 1ST ANTB: CPT | Mod: 26,,, | Performed by: PATHOLOGY

## 2023-02-06 PROCEDURE — 88342 CHG IMMUNOCYTOCHEMISTRY: ICD-10-PCS | Mod: 26,,, | Performed by: PATHOLOGY

## 2023-02-06 PROCEDURE — 88305 TISSUE EXAM BY PATHOLOGIST: ICD-10-PCS | Mod: 26,,, | Performed by: PATHOLOGY

## 2023-02-06 PROCEDURE — 88341 IMHCHEM/IMCYTCHM EA ADD ANTB: CPT | Mod: 59 | Performed by: PATHOLOGY

## 2023-02-06 PROCEDURE — 76872 US TRANSRECTAL: CPT | Mod: 26,S$GLB,, | Performed by: UROLOGY

## 2023-02-06 PROCEDURE — 55700 PR BIOPSY OF PROSTATE,NEEDLE/PUNCH: ICD-10-PCS | Mod: S$GLB,,, | Performed by: UROLOGY

## 2023-02-06 PROCEDURE — 55700 PR BIOPSY OF PROSTATE,NEEDLE/PUNCH: CPT | Mod: S$GLB,,, | Performed by: UROLOGY

## 2023-02-06 NOTE — PROCEDURES
Procedures    [facility]     Prostate Needle Biopsy Note     Title of Operation:   1. Echography of prostate  2. US guidance for needle biopsy  3. Transrectal needle biopsy of prostate    Date of Procedure:  02/06/2023     Indications for Surgery:   This is a 67 y.o. year old gentleman with a history of elevated PSA. He returns today for transrectal ultrasound guided prostate biopsy.     Preoperative Diagnosis:   Elevated PSA    Postoperative Diagnosis:   Elevated PSA    Anesthesia:   Local 1% lidocaine    Specimen (Bacteriological, Pathological or other):   Prostate biopsy 12 cores.    Digital Rectal Exam:  Appropriate sphincter tone, smooth/rubbery prostate with nodules    Surgeons Narrative:   The patient signed a written informed consent. The risks and benefits and expected outcomes were discussed. The patient was counseled. The patient took the necessary preparations for the procedure including preoperative antibiotics and bowel prep.    The patient was brought into the procedure room and placed in the left lateral decubitus position. The patient was prepped in the standard fashion. Prostate anesthetic block was performed using 1% Lidocaine. The biplanar transrectal ultrasound probe was placed in the rectum. Imaging in transverse and longitudinal views was done with the findings as indicated below.    No abnormal lesions were noted on TRUS.    Biopsies were taken from the base, mid-gland and apex bilaterally as indicated.    Total number of biopsies taken: 12.    Multiple biopsies of the prostate via needle were obtained in the sextant area using ultrasonic guidance into the rectum.    The patient tolerated the procedure well. Post operative instructions and contact information were given to the patient in detail per post op instruction sheet. The patient voided prior to discharge from clinic.    Plan:  - F/u pathology results  - Return precautions discussed with patient, including but not limited to:  fevers, chills, dysuria, flank pain, or any other concerning symptoms  - Educated patient on possible expected post-biopsy symptoms, including: hematuria, hematospermia, and blood per rectum.  - Continue antibiotic prophylaxis as prescribed.  - Return to clinic in 2-3 weeks for review of pathology results.    Adilson Fields MD  Urology  [facility]      Suggested codes:  1. Prostate biopsy CPT 47930  2. Transrectal ultrasound CPT 86605 -26 -59  3. Ultrasound guided needle placement CPT 16454 -26 -59

## 2023-02-06 NOTE — PROGRESS NOTES
[facility]   Clinic Note    SUBJECTIVE:     Chief Complaint   Patient presents with    Other     Trus/biopsy        Referral from: Adilson Fields MD.    History of Present Illness:  Avni Reyes is a 67 y.o. male who presents to clinic for evaluation of elevated PSA.      Patient denies any history of previously elevated PSA.  Patient was unaware that his PSA was elevated and is up trending.  Denies any family history of prostate cancer.  Denies any voiding dysfunction.  Does not take any blood thinners.    PSA:  Lab Results   Component Value Date    PSA 27.6 (H) 11/21/2022    PSA 25.0 (H) 10/19/2021    PSA 14.8 (H) 03/12/2019       Previously abnormal PSA: yes.   Previous biopsy: no.   Family history of prostate cancer: no.      02/06/2023   Here for PNBx    Patient endorses no additional complaints at this time.    Anticoagulation/Antiplatelets:  No    Past Medical History:   Diagnosis Date    COPD (chronic obstructive pulmonary disease)     Hyperlipidemia     Hypertension        Past Surgical History:   Procedure Laterality Date    COLONOSCOPY      2009. Unknown results but pathology samples taken per patient.     LEG SURGERY      seconndary to [facility]- Right leg       Family History   Problem Relation Age of Onset    Hyperlipidemia Mother     Heart disease Mother     Heart disease Father     Hyperlipidemia Father     Diabetes Father        Social History     Tobacco Use    Smoking status: Some Days     Packs/day: 1.50     Years: 39.00     Pack years: 58.50     Types: Cigarettes     Start date: 12/16/1967    Smokeless tobacco: Never   Substance Use Topics    Alcohol use: Yes     Comment: social    Drug use: No       Current Outpatient Medications on File Prior to Visit   Medication Sig Dispense Refill    albuterol (PROVENTIL HFA) 90 mcg/actuation inhaler Inhale 2 puffs into the lungs every 6 (six) hours as needed for Wheezing. Rescue 6.7 g 6    albuterol-ipratropium (DUO-NEB) 2.5 mg-0.5 mg/3 mL nebulizer  "solution Take 3 mLs by nebulization every 6 (six) hours as needed for Wheezing or Shortness of Breath. Rescue 3240 mL 0    atorvastatin (LIPITOR) 40 MG tablet Take 1 tablet (40 mg total) by mouth once daily. 90 tablet 3    fluticasone propionate (FLOVENT HFA) 110 mcg/actuation inhaler Inhale 1 puff into the lungs 2 (two) times daily.      fluticasone-umeclidin-vilanter (TRELEGY ELLIPTA) 100-62.5-25 mcg DsDv Inhale 1 puff into the lungs once daily. 60 each 3    omeprazole (PRILOSEC) 20 MG capsule Take 1 capsule (20 mg total) by mouth once daily. 30 capsule 12    pantoprazole (PROTONIX) 40 MG tablet Take 40 mg by mouth.      senna (SENOKOT) 8.6 mg tablet Take 8.6 mg by mouth.      tamsulosin (FLOMAX) 0.4 mg Cap Take 1 capsule (0.4 mg total) by mouth once daily. 90 capsule 3    [DISCONTINUED] tiotropium (SPIRIVA WITH HANDIHALER) 18 mcg inhalation capsule Inhale contents 1 capsule (18 mcg total) via handihaler into the lungs once daily. Controller do not swallow 360 capsule 0     No current facility-administered medications on file prior to visit.       Review of patient's allergies indicates:  No Known Allergies    Review of Systems:  A review of 10+ systems was conducted with pertinent positive and negative findings documented in HPI with all other systems reviewed and negative.    OBJECTIVE:     Estimated body mass index is 19.49 kg/m² as calculated from the following:    Height as of this encounter: 6' 1" (1.854 m).    Weight as of this encounter: 67 kg (147 lb 11.3 oz).    Vital Signs (Most Recent)  Vitals:    02/06/23 0938   BP: (!) 150/84   Pulse: (!) 126       Physical Exam:  GENERAL: patient sitting comfortably  HEENT: normocephalic  NECK: supple, no JVD  PULM: normal chest rise, no increased WOB  HEART: non-diaphoretic  ABDO: soft, nondistended, nontender  BACK: no CVA tenderness bilaterally  SKIN: warm, dry, well perfused  EXT: no bruising or edema  NEURO: grossly normal with no focal deficits  PSYCH: " appropriate mood and affect    Genitourinary Exam:  Will do INGRID at time of biopsy    Lab Results   Component Value Date    BUN 14 05/30/2022    CREATININE 1.0 11/21/2022    WBC 3.78 (L) 11/21/2022    HGB 12.5 (L) 11/21/2022    HCT 42.5 11/21/2022     11/21/2022    AST 16 05/26/2022    ALT 13 05/26/2022    ALKPHOS 47 (L) 05/26/2022    ALBUMIN 3.5 05/26/2022    HGBA1C 5.5 10/19/2021        PSA:  Lab Results   Component Value Date    PSA 27.6 (H) 11/21/2022    PSA 25.0 (H) 10/19/2021    PSA 14.8 (H) 03/12/2019       Testosterone:  No results found for: TOTALTESTOST, TOTALTESTOST, TESTOSTERONE     Imaging:  I have personally reviewed all relevant imaging.    No results found for this or any previous visit (from the past 2160 hour(s)).  No results found for this or any previous visit (from the past 2160 hour(s)).  CT Chest Without Contrast  Narrative: EXAMINATION:  CT CHEST WITHOUT CONTRAST    CLINICAL HISTORY:  Dyspnea, chronic, unclear etiology;    TECHNIQUE:  Low dose axial images, sagittal and coronal reformations were obtained from the thoracic inlet to the lung bases. Contrast was not administered.    COMPARISON:  CT scan 05/29/2022; chest radiograph 05/26/2022; CT chest 11/08/2021    FINDINGS:  LINES/TUBES:  None.    SOFT TISSUES: No axillary or subpectoral lymphadenopathy. The visualized thyroid gland is unremarkable.    HEART AND MEDIASTINUM: No mediastinal or hilar lymphadenopathy. Heart and pericardium are within normal limits. Scattered calcifications of the coronary arteries and thoracic aorta.  The main pulmonary artery is not enlarged.    PLEURA: No pleural effusion or pneumothorax.    LUNGS AND AIRWAYS: Aerated secretions in the trachea.  Mild lower lobe predominant bronchial wall thickening.  Severe upper lobe predominant panlobular emphysema.  No focal consolidation.  Bibasilar subsegmental atelectasis and/or scarring.  Scattered calcified granulomas.  Scattered solid pulmonary nodules, the  largest measures 4 mm in the right lung apex on 302:54.  These are unchanged from 11/08/2021.  Some of the previous nodules are less conspicuous.  No new or enlarging pulmonary nodules.    ABDOMEN: Bilateral renal cysts.  No other abnormality in the visualized upper abdomen.    BONES: Degenerative changes.  No acute fractures. No suspicious lytic or sclerotic lesions.  Impression: Severe panlobular emphysema with chronic bronchitis.  No focal consolidations.    Unchanged pulmonary nodules measuring up to 4 mm.  Attention on follow-up lung cancer screening CT is recommended.    Electronically signed by: Linus Eaton  Date:    05/29/2022  Time:    14:27  NM Lung Scan Perfusion Particulate  Narrative: EXAMINATION:  NM LUNG SCAN PERFUSION    CLINICAL HISTORY:  Pulmonary embolism (PE) suspected, positive D-dimer;pe;    TECHNIQUE:  Following the IV administration of 5.5 mCi of Tc-99m-MAA, multiple images of the thorax were obtained in various projections.  Ventilation images were not performed due to COVID-19 aerosol precautions.    COMPARISON:  Prior thoracic CT dated 11/08/2021 and prior chest radiograph from 05/06/2022    FINDINGS:  Perfusion: Nonsegmental perfusion defects are present in both lungs with an upper lung zone predominance, corresponding to areas of severe centrilobular emphysema evident on previous CT.  Impression: This represents a low suspicion for pulmonary embolism.    Electronically signed by: Lisseth Perrin MD  Date:    05/29/2022  Time:    11:26      ASSESSMENT     1. Elevated PSA        PLAN:     Elevated PSA    - Had PNBx today  - Cont abx  - Follow up 2 weeks    Adilson Fields MD  Urology  [facility]    Disclaimer: This note has been generated using voice-recognition software. There may be typographical errors that have been missed during proof-reading.

## 2023-02-13 LAB
FINAL PATHOLOGIC DIAGNOSIS: NORMAL
GROSS: NORMAL
Lab: NORMAL

## 2023-02-27 ENCOUNTER — OFFICE VISIT (OUTPATIENT)
Dept: UROLOGY | Facility: CLINIC | Age: 68
End: 2023-02-27
Payer: MEDICARE

## 2023-02-27 VITALS
BODY MASS INDEX: 18.25 KG/M2 | WEIGHT: 137.69 LBS | DIASTOLIC BLOOD PRESSURE: 74 MMHG | HEIGHT: 73 IN | SYSTOLIC BLOOD PRESSURE: 118 MMHG | HEART RATE: 112 BPM

## 2023-02-27 DIAGNOSIS — N42.32 ATYPICAL SMALL ACINAR PROLIFERATION OF PROSTATE: ICD-10-CM

## 2023-02-27 DIAGNOSIS — R97.20 ELEVATED PSA: Primary | ICD-10-CM

## 2023-02-27 PROCEDURE — 1159F PR MEDICATION LIST DOCUMENTED IN MEDICAL RECORD: ICD-10-PCS | Mod: CPTII,S$GLB,, | Performed by: UROLOGY

## 2023-02-27 PROCEDURE — 1101F PR PT FALLS ASSESS DOC 0-1 FALLS W/OUT INJ PAST YR: ICD-10-PCS | Mod: CPTII,S$GLB,, | Performed by: UROLOGY

## 2023-02-27 PROCEDURE — 3074F SYST BP LT 130 MM HG: CPT | Mod: CPTII,S$GLB,, | Performed by: UROLOGY

## 2023-02-27 PROCEDURE — 3008F BODY MASS INDEX DOCD: CPT | Mod: CPTII,S$GLB,, | Performed by: UROLOGY

## 2023-02-27 PROCEDURE — 3078F DIAST BP <80 MM HG: CPT | Mod: CPTII,S$GLB,, | Performed by: UROLOGY

## 2023-02-27 PROCEDURE — 1101F PT FALLS ASSESS-DOCD LE1/YR: CPT | Mod: CPTII,S$GLB,, | Performed by: UROLOGY

## 2023-02-27 PROCEDURE — 99214 PR OFFICE/OUTPT VISIT, EST, LEVL IV, 30-39 MIN: ICD-10-PCS | Mod: S$GLB,,, | Performed by: UROLOGY

## 2023-02-27 PROCEDURE — 3008F PR BODY MASS INDEX (BMI) DOCUMENTED: ICD-10-PCS | Mod: CPTII,S$GLB,, | Performed by: UROLOGY

## 2023-02-27 PROCEDURE — 1126F PR PAIN SEVERITY QUANTIFIED, NO PAIN PRESENT: ICD-10-PCS | Mod: CPTII,S$GLB,, | Performed by: UROLOGY

## 2023-02-27 PROCEDURE — 1159F MED LIST DOCD IN RCRD: CPT | Mod: CPTII,S$GLB,, | Performed by: UROLOGY

## 2023-02-27 PROCEDURE — 3078F PR MOST RECENT DIASTOLIC BLOOD PRESSURE < 80 MM HG: ICD-10-PCS | Mod: CPTII,S$GLB,, | Performed by: UROLOGY

## 2023-02-27 PROCEDURE — 1126F AMNT PAIN NOTED NONE PRSNT: CPT | Mod: CPTII,S$GLB,, | Performed by: UROLOGY

## 2023-02-27 PROCEDURE — 99214 OFFICE O/P EST MOD 30 MIN: CPT | Mod: S$GLB,,, | Performed by: UROLOGY

## 2023-02-27 PROCEDURE — 99999 PR PBB SHADOW E&M-EST. PATIENT-LVL III: ICD-10-PCS | Mod: PBBFAC,,, | Performed by: UROLOGY

## 2023-02-27 PROCEDURE — 3288F FALL RISK ASSESSMENT DOCD: CPT | Mod: CPTII,S$GLB,, | Performed by: UROLOGY

## 2023-02-27 PROCEDURE — 3288F PR FALLS RISK ASSESSMENT DOCUMENTED: ICD-10-PCS | Mod: CPTII,S$GLB,, | Performed by: UROLOGY

## 2023-02-27 PROCEDURE — 99999 PR PBB SHADOW E&M-EST. PATIENT-LVL III: CPT | Mod: PBBFAC,,, | Performed by: UROLOGY

## 2023-02-27 PROCEDURE — 3074F PR MOST RECENT SYSTOLIC BLOOD PRESSURE < 130 MM HG: ICD-10-PCS | Mod: CPTII,S$GLB,, | Performed by: UROLOGY

## 2023-02-27 RX ORDER — CIPROFLOXACIN 500 MG/1
500 TABLET ORAL 2 TIMES DAILY
Qty: 2 TABLET | Refills: 0 | Status: SHIPPED | OUTPATIENT
Start: 2023-02-27 | End: 2023-03-10

## 2023-02-27 RX ORDER — PANTOPRAZOLE SODIUM 40 MG/1
1 TABLET, DELAYED RELEASE ORAL DAILY
COMMUNITY
Start: 2023-02-13

## 2023-02-27 RX ORDER — ALBUTEROL SULFATE 0.83 MG/ML
2.5 SOLUTION RESPIRATORY (INHALATION) EVERY 6 HOURS PRN
COMMUNITY
Start: 2023-02-01

## 2023-02-27 NOTE — Clinical Note
Patient needs to be scheduled for a repeat prostate needle biopsy in 6 months.  If the schedules not open that long less just make sure there is a recall of some kind so that he can be scheduled for it.

## 2023-02-27 NOTE — PROGRESS NOTES
[facility]   Clinic Note    SUBJECTIVE:     Chief Complaint   Patient presents with    Other     3 week follow up        Referral from: No ref. provider found.    History of Present Illness:  Avni Reyes is a 67 y.o. male who presents to clinic for evaluation of elevated PSA.      Patient denies any history of previously elevated PSA.  Patient was unaware that his PSA was elevated and is up trending.  Denies any family history of prostate cancer.  Denies any voiding dysfunction.  Does not take any blood thinners.    02/27/2023  Here for follow up for pathology review post biopsy.  Biopsy revealed no malignant pathology however there was 1 area with small acinar proliferation of the prostate.    Path:  1. Prostate, right apex, needle core biopsy:   - Benign prostatic tissue with atrophic changes   2. Prostate, right mid, needle core biopsy:   - Benign prostatic tissue   3. Prostate, right base, needle core biopsy:   - Benign prostatic tissue   4. Prostate, left apex, needle core biopsy:   - Atypical small acinar proliferation   - Immunostains for AMACR, HMWK, and p63 have been performed with   appropriately reactive controls and the area of interest is negative for   AMACR staining, and lacks basal cell staining with p63 and HMWK, supporting   the above interpretation.   5. Prostate, left mid, needle core biopsy:   - Benign prostatic tissue   6. Prostate, left base, needle core biopsy:   - Benign prostatic tissue     PSA:  Lab Results   Component Value Date    PSA 27.6 (H) 11/21/2022    PSA 25.0 (H) 10/19/2021    PSA 14.8 (H) 03/12/2019       Previously abnormal PSA: yes.   Previous biopsy: no.   Family history of prostate cancer: no.      02/06/2023   Here for PNBx    Patient endorses no additional complaints at this time.    Anticoagulation/Antiplatelets:  No    Past Medical History:   Diagnosis Date    COPD (chronic obstructive pulmonary disease)     Hyperlipidemia     Hypertension        Past Surgical  History:   Procedure Laterality Date    COLONOSCOPY      2009. Unknown results but pathology samples taken per patient.     LEG SURGERY      seconndary to [facility]- Right leg       Family History   Problem Relation Age of Onset    Hyperlipidemia Mother     Heart disease Mother     Heart disease Father     Hyperlipidemia Father     Diabetes Father        Social History     Tobacco Use    Smoking status: Some Days     Packs/day: 1.50     Years: 39.00     Pack years: 58.50     Types: Cigarettes     Start date: 12/16/1967    Smokeless tobacco: Never   Substance Use Topics    Alcohol use: Yes     Comment: social    Drug use: No       Current Outpatient Medications on File Prior to Visit   Medication Sig Dispense Refill    albuterol (PROVENTIL) 2.5 mg /3 mL (0.083 %) nebulizer solution Take 2.5 mg by nebulization every 6 (six) hours as needed.      albuterol-ipratropium (DUO-NEB) 2.5 mg-0.5 mg/3 mL nebulizer solution Take 3 mLs by nebulization every 6 (six) hours as needed for Wheezing or Shortness of Breath. Rescue 3240 mL 0    atorvastatin (LIPITOR) 40 MG tablet Take 1 tablet (40 mg total) by mouth once daily. 90 tablet 3    fluticasone propionate (FLOVENT HFA) 110 mcg/actuation inhaler Inhale 1 puff into the lungs 2 (two) times daily.      fluticasone-umeclidin-vilanter (TRELEGY ELLIPTA) 100-62.5-25 mcg DsDv Inhale 1 puff into the lungs once daily. 60 each 3    omeprazole (PRILOSEC) 20 MG capsule Take 1 capsule (20 mg total) by mouth once daily. 30 capsule 12    pantoprazole (PROTONIX) 40 MG tablet Take 40 mg by mouth.      pantoprazole (PROTONIX) 40 MG tablet Take 1 tablet by mouth once daily.      senna (SENOKOT) 8.6 mg tablet Take 8.6 mg by mouth.      tamsulosin (FLOMAX) 0.4 mg Cap Take 1 capsule (0.4 mg total) by mouth once daily. 90 capsule 3    [DISCONTINUED] tiotropium (SPIRIVA WITH HANDIHALER) 18 mcg inhalation capsule Inhale contents 1 capsule (18 mcg total) via handihaler into the lungs once daily. Controller do  "not swallow 360 capsule 0     No current facility-administered medications on file prior to visit.       Review of patient's allergies indicates:  No Known Allergies    Review of Systems:  A review of 10+ systems was conducted with pertinent positive and negative findings documented in HPI with all other systems reviewed and negative.    OBJECTIVE:     Estimated body mass index is 18.16 kg/m² as calculated from the following:    Height as of this encounter: 6' 1" (1.854 m).    Weight as of this encounter: 62.5 kg (137 lb 10.8 oz).    Vital Signs (Most Recent)  Vitals:    02/27/23 1343   BP: 118/74   Pulse: (!) 112       Physical Exam:  GENERAL: patient sitting comfortably  HEENT: normocephalic  NECK: supple, no JVD  PULM: normal chest rise, no increased WOB  HEART: non-diaphoretic  ABDO: soft, nondistended, nontender  BACK: no CVA tenderness bilaterally  SKIN: warm, dry, well perfused  EXT: no bruising or edema  NEURO: grossly normal with no focal deficits  PSYCH: appropriate mood and affect    Genitourinary Exam:  Will do INGRID at time of biopsy    Lab Results   Component Value Date    BUN 14 05/30/2022    CREATININE 1.0 11/21/2022    WBC 3.78 (L) 11/21/2022    HGB 12.5 (L) 11/21/2022    HCT 42.5 11/21/2022     11/21/2022    AST 16 05/26/2022    ALT 13 05/26/2022    ALKPHOS 47 (L) 05/26/2022    ALBUMIN 3.5 05/26/2022    HGBA1C 5.5 10/19/2021        PSA:  Lab Results   Component Value Date    PSA 27.6 (H) 11/21/2022    PSA 25.0 (H) 10/19/2021    PSA 14.8 (H) 03/12/2019       Testosterone:  No results found for: TOTALTESTOST, TOTALTESTOST, TESTOSTERONE     Imaging:  I have personally reviewed all relevant imaging.    No results found for this or any previous visit (from the past 2160 hour(s)).  No results found for this or any previous visit (from the past 2160 hour(s)).  CT Chest Without Contrast  Narrative: EXAMINATION:  CT CHEST WITHOUT CONTRAST    CLINICAL HISTORY:  Dyspnea, chronic, unclear " etiology;    TECHNIQUE:  Low dose axial images, sagittal and coronal reformations were obtained from the thoracic inlet to the lung bases. Contrast was not administered.    COMPARISON:  CT scan 05/29/2022; chest radiograph 05/26/2022; CT chest 11/08/2021    FINDINGS:  LINES/TUBES:  None.    SOFT TISSUES: No axillary or subpectoral lymphadenopathy. The visualized thyroid gland is unremarkable.    HEART AND MEDIASTINUM: No mediastinal or hilar lymphadenopathy. Heart and pericardium are within normal limits. Scattered calcifications of the coronary arteries and thoracic aorta.  The main pulmonary artery is not enlarged.    PLEURA: No pleural effusion or pneumothorax.    LUNGS AND AIRWAYS: Aerated secretions in the trachea.  Mild lower lobe predominant bronchial wall thickening.  Severe upper lobe predominant panlobular emphysema.  No focal consolidation.  Bibasilar subsegmental atelectasis and/or scarring.  Scattered calcified granulomas.  Scattered solid pulmonary nodules, the largest measures 4 mm in the right lung apex on 302:54.  These are unchanged from 11/08/2021.  Some of the previous nodules are less conspicuous.  No new or enlarging pulmonary nodules.    ABDOMEN: Bilateral renal cysts.  No other abnormality in the visualized upper abdomen.    BONES: Degenerative changes.  No acute fractures. No suspicious lytic or sclerotic lesions.  Impression: Severe panlobular emphysema with chronic bronchitis.  No focal consolidations.    Unchanged pulmonary nodules measuring up to 4 mm.  Attention on follow-up lung cancer screening CT is recommended.    Electronically signed by: Linus Eaton  Date:    05/29/2022  Time:    14:27  NM Lung Scan Perfusion Particulate  Narrative: EXAMINATION:  NM LUNG SCAN PERFUSION    CLINICAL HISTORY:  Pulmonary embolism (PE) suspected, positive D-dimer;pe;    TECHNIQUE:  Following the IV administration of 5.5 mCi of Tc-99m-MAA, multiple images of the thorax were obtained in various  projections.  Ventilation images were not performed due to COVID-19 aerosol precautions.    COMPARISON:  Prior thoracic CT dated 11/08/2021 and prior chest radiograph from 05/06/2022    FINDINGS:  Perfusion: Nonsegmental perfusion defects are present in both lungs with an upper lung zone predominance, corresponding to areas of severe centrilobular emphysema evident on previous CT.  Impression: This represents a low suspicion for pulmonary embolism.    Electronically signed by: Lisseth Perrin MD  Date:    05/29/2022  Time:    11:26      ASSESSMENT     1. Elevated PSA    2. Atypical small acinar proliferation of prostate        PLAN:     Elevated PSA  Atypical Small Acinar Proliferation    - We had a long and extensive discussion regarding his pathology results, particularly about atypical acinar proliferation (ASAP) and prostate cancer.    - Atypical small acinar proliferation is a highly suspicious glanular architecture that is insufficient for the diagnosis of prostate cancer. It is associated with a 40-60% risk of cancer detection on repeat biopsy.  Because of this, a diagnosis of atypical small acinar proliferation of the prostate requires a repeat prostate biopsy.    - We will plan for a repeat in 6 months followed by a repeat prostate biopsy. We will check PSA at that time.  Patient is not a candidate for MRI because he is metallic implants in his jaw.  We discussed deferring a repeat biopsy to a later date but he would like to proceed at 6 months.    Adilson Fields MD  Urology  [facility]    Disclaimer: This note has been generated using voice-recognition software. There may be typographical errors that have been missed during proof-reading.

## 2023-03-06 ENCOUNTER — OFFICE VISIT (OUTPATIENT)
Dept: SURGERY | Facility: CLINIC | Age: 68
End: 2023-03-06
Payer: MEDICARE

## 2023-03-06 VITALS
SYSTOLIC BLOOD PRESSURE: 134 MMHG | HEIGHT: 73 IN | HEART RATE: 106 BPM | WEIGHT: 146.69 LBS | DIASTOLIC BLOOD PRESSURE: 81 MMHG | BODY MASS INDEX: 19.44 KG/M2

## 2023-03-06 DIAGNOSIS — K40.90 LEFT INGUINAL HERNIA: ICD-10-CM

## 2023-03-06 DIAGNOSIS — K40.90 NON-RECURRENT UNILATERAL INGUINAL HERNIA WITHOUT OBSTRUCTION OR GANGRENE: Primary | ICD-10-CM

## 2023-03-06 PROCEDURE — 3288F FALL RISK ASSESSMENT DOCD: CPT | Mod: CPTII,S$GLB,, | Performed by: STUDENT IN AN ORGANIZED HEALTH CARE EDUCATION/TRAINING PROGRAM

## 2023-03-06 PROCEDURE — 3079F DIAST BP 80-89 MM HG: CPT | Mod: CPTII,S$GLB,, | Performed by: STUDENT IN AN ORGANIZED HEALTH CARE EDUCATION/TRAINING PROGRAM

## 2023-03-06 PROCEDURE — 1101F PR PT FALLS ASSESS DOC 0-1 FALLS W/OUT INJ PAST YR: ICD-10-PCS | Mod: CPTII,S$GLB,, | Performed by: STUDENT IN AN ORGANIZED HEALTH CARE EDUCATION/TRAINING PROGRAM

## 2023-03-06 PROCEDURE — 99214 OFFICE O/P EST MOD 30 MIN: CPT | Mod: S$GLB,,, | Performed by: STUDENT IN AN ORGANIZED HEALTH CARE EDUCATION/TRAINING PROGRAM

## 2023-03-06 PROCEDURE — 1159F MED LIST DOCD IN RCRD: CPT | Mod: CPTII,S$GLB,, | Performed by: STUDENT IN AN ORGANIZED HEALTH CARE EDUCATION/TRAINING PROGRAM

## 2023-03-06 PROCEDURE — 99214 PR OFFICE/OUTPT VISIT, EST, LEVL IV, 30-39 MIN: ICD-10-PCS | Mod: S$GLB,,, | Performed by: STUDENT IN AN ORGANIZED HEALTH CARE EDUCATION/TRAINING PROGRAM

## 2023-03-06 PROCEDURE — 3075F SYST BP GE 130 - 139MM HG: CPT | Mod: CPTII,S$GLB,, | Performed by: STUDENT IN AN ORGANIZED HEALTH CARE EDUCATION/TRAINING PROGRAM

## 2023-03-06 PROCEDURE — 3008F PR BODY MASS INDEX (BMI) DOCUMENTED: ICD-10-PCS | Mod: CPTII,S$GLB,, | Performed by: STUDENT IN AN ORGANIZED HEALTH CARE EDUCATION/TRAINING PROGRAM

## 2023-03-06 PROCEDURE — 99999 PR PBB SHADOW E&M-EST. PATIENT-LVL IV: CPT | Mod: PBBFAC,,, | Performed by: STUDENT IN AN ORGANIZED HEALTH CARE EDUCATION/TRAINING PROGRAM

## 2023-03-06 PROCEDURE — 3288F PR FALLS RISK ASSESSMENT DOCUMENTED: ICD-10-PCS | Mod: CPTII,S$GLB,, | Performed by: STUDENT IN AN ORGANIZED HEALTH CARE EDUCATION/TRAINING PROGRAM

## 2023-03-06 PROCEDURE — 99999 PR PBB SHADOW E&M-EST. PATIENT-LVL IV: ICD-10-PCS | Mod: PBBFAC,,, | Performed by: STUDENT IN AN ORGANIZED HEALTH CARE EDUCATION/TRAINING PROGRAM

## 2023-03-06 PROCEDURE — 1160F PR REVIEW ALL MEDS BY PRESCRIBER/CLIN PHARMACIST DOCUMENTED: ICD-10-PCS | Mod: CPTII,S$GLB,, | Performed by: STUDENT IN AN ORGANIZED HEALTH CARE EDUCATION/TRAINING PROGRAM

## 2023-03-06 PROCEDURE — 3075F PR MOST RECENT SYSTOLIC BLOOD PRESS GE 130-139MM HG: ICD-10-PCS | Mod: CPTII,S$GLB,, | Performed by: STUDENT IN AN ORGANIZED HEALTH CARE EDUCATION/TRAINING PROGRAM

## 2023-03-06 PROCEDURE — 1160F RVW MEDS BY RX/DR IN RCRD: CPT | Mod: CPTII,S$GLB,, | Performed by: STUDENT IN AN ORGANIZED HEALTH CARE EDUCATION/TRAINING PROGRAM

## 2023-03-06 PROCEDURE — 3008F BODY MASS INDEX DOCD: CPT | Mod: CPTII,S$GLB,, | Performed by: STUDENT IN AN ORGANIZED HEALTH CARE EDUCATION/TRAINING PROGRAM

## 2023-03-06 PROCEDURE — 3079F PR MOST RECENT DIASTOLIC BLOOD PRESSURE 80-89 MM HG: ICD-10-PCS | Mod: CPTII,S$GLB,, | Performed by: STUDENT IN AN ORGANIZED HEALTH CARE EDUCATION/TRAINING PROGRAM

## 2023-03-06 PROCEDURE — 1125F PR PAIN SEVERITY QUANTIFIED, PAIN PRESENT: ICD-10-PCS | Mod: CPTII,S$GLB,, | Performed by: STUDENT IN AN ORGANIZED HEALTH CARE EDUCATION/TRAINING PROGRAM

## 2023-03-06 PROCEDURE — 1125F AMNT PAIN NOTED PAIN PRSNT: CPT | Mod: CPTII,S$GLB,, | Performed by: STUDENT IN AN ORGANIZED HEALTH CARE EDUCATION/TRAINING PROGRAM

## 2023-03-06 PROCEDURE — 1101F PT FALLS ASSESS-DOCD LE1/YR: CPT | Mod: CPTII,S$GLB,, | Performed by: STUDENT IN AN ORGANIZED HEALTH CARE EDUCATION/TRAINING PROGRAM

## 2023-03-06 PROCEDURE — 1159F PR MEDICATION LIST DOCUMENTED IN MEDICAL RECORD: ICD-10-PCS | Mod: CPTII,S$GLB,, | Performed by: STUDENT IN AN ORGANIZED HEALTH CARE EDUCATION/TRAINING PROGRAM

## 2023-03-10 NOTE — PROGRESS NOTES
Patient ID: Avni Reyes is a 67 y.o. male.    Chief Complaint: No chief complaint on file.      HPI:  HPI  67M with left groin bulge for about a year. Was seen by surgery before. Hx of COPD and hernia was not bothersome so it was not recommended to undergo repair. Now with pain, says he cant sleep. Worse with activity. Bulge is larger. Had BM only about twice per week, occasional nausea.   Denies right sided symptoms.   Says hernia will go back in when he relaxes at home but comes right back.   Known smoker, still smokes. COPD on multiple inhalers that he uses as needed. Has oxygen at home that he says he uses when he feels like he needs to. Occasional SOB with activity. Walking with cane today.   On flomax for difficulty urinating. Scheduled for prostate biopsy next month.   No hx of abdominal surgery.     3/9/2023  Hernia more boterhsome.   Rostate biopsy done, no malignancy  He quit smoking several weeks ago      Review of Systems   Constitutional:  Negative for chills, diaphoresis and fever.   HENT:  Negative for trouble swallowing.    Respiratory:  Negative for cough, shortness of breath, wheezing and stridor.    Cardiovascular:  Negative for chest pain and palpitations.   Gastrointestinal:  Positive for abdominal pain. Negative for abdominal distention, blood in stool, diarrhea, nausea and vomiting.   Endocrine: Negative for cold intolerance and heat intolerance.   Genitourinary:  Negative for difficulty urinating.   Musculoskeletal:  Negative for back pain.   Skin:  Negative for rash.   Allergic/Immunologic: Negative for immunocompromised state.   Neurological:  Negative for dizziness, syncope and numbness.   Hematological:  Negative for adenopathy.   Psychiatric/Behavioral:  Negative for agitation.      Current Outpatient Medications   Medication Sig Dispense Refill    albuterol (PROVENTIL) 2.5 mg /3 mL (0.083 %) nebulizer solution Take 2.5 mg by nebulization every 6 (six) hours as needed.       albuterol-ipratropium (DUO-NEB) 2.5 mg-0.5 mg/3 mL nebulizer solution Take 3 mLs by nebulization every 6 (six) hours as needed for Wheezing or Shortness of Breath. Rescue 3240 mL 0    atorvastatin (LIPITOR) 40 MG tablet Take 1 tablet (40 mg total) by mouth once daily. 90 tablet 3    fluticasone propionate (FLOVENT HFA) 110 mcg/actuation inhaler Inhale 1 puff into the lungs 2 (two) times daily.      omeprazole (PRILOSEC) 20 MG capsule Take 1 capsule (20 mg total) by mouth once daily. 30 capsule 12    pantoprazole (PROTONIX) 40 MG tablet Take 40 mg by mouth.      pantoprazole (PROTONIX) 40 MG tablet Take 1 tablet by mouth once daily.      senna (SENOKOT) 8.6 mg tablet Take 8.6 mg by mouth.      tamsulosin (FLOMAX) 0.4 mg Cap Take 1 capsule (0.4 mg total) by mouth once daily. 90 capsule 3    ciprofloxacin HCl (CIPRO) 500 MG tablet Take 1 tablet (500 mg total) by mouth 2 (two) times daily. Please start medicine the day of your biopsy for 1 day 2 tablet 0     No current facility-administered medications for this visit.       Review of patient's allergies indicates:  No Known Allergies    Past Medical History:   Diagnosis Date    COPD (chronic obstructive pulmonary disease)     Hyperlipidemia     Hypertension        Past Surgical History:   Procedure Laterality Date    COLONOSCOPY      2009. Unknown results but pathology samples taken per patient.     LEG SURGERY      seconndary to [facility]- Right leg       Family History   Problem Relation Age of Onset    Hyperlipidemia Mother     Heart disease Mother     Heart disease Father     Hyperlipidemia Father     Diabetes Father        Social History     Socioeconomic History    Marital status: Single   Tobacco Use    Smoking status: Some Days     Packs/day: 1.50     Years: 39.00     Pack years: 58.50     Types: Cigarettes     Start date: 12/16/1967    Smokeless tobacco: Never   Substance and Sexual Activity    Alcohol use: Yes     Comment: social    Drug use: No    Sexual activity:  Yes     Partners: Female   Social History Narrative    Living in [place] at least part of the time.     In relationship with female partner Parisa       Vitals:    03/06/23 0751   BP: 134/81   Pulse: 106       Physical Exam  Constitutional:       General: He is not in acute distress.  HENT:      Head: Normocephalic and atraumatic.   Eyes:      General: No scleral icterus.  Cardiovascular:      Rate and Rhythm: Normal rate.   Pulmonary:      Effort: Pulmonary effort is normal. No respiratory distress.      Breath sounds: No stridor.   Abdominal:      Palpations: Abdomen is soft.      Tenderness: There is no abdominal tenderness.      Comments: Large LIH  No obvious RIH   Lymphadenopathy:      Cervical: No cervical adenopathy.   Skin:     General: Skin is warm.      Findings: No erythema.   Neurological:      Mental Status: He is alert and oriented to person, place, and time.   Psychiatric:         Behavior: Behavior normal.   Body mass index is 19.36 kg/m².    Hg 12  PSA 27  Hga1c 5.5 last year  No cscope      Assessment & Plan:   67M with large LIH  Would likely be safe to repair open under local anesthesia, MAC. Would not do general anesthesia  Plan for open [facility] under mac local March 28

## 2023-03-20 DIAGNOSIS — J44.1 COPD EXACERBATION: ICD-10-CM

## 2023-03-20 RX ORDER — IPRATROPIUM BROMIDE AND ALBUTEROL 20; 100 UG/1; UG/1
SPRAY, METERED RESPIRATORY (INHALATION)
Qty: 4 G | Refills: 11 | OUTPATIENT
Start: 2023-03-20

## 2023-03-22 ENCOUNTER — HOSPITAL ENCOUNTER (OUTPATIENT)
Dept: PREADMISSION TESTING | Facility: HOSPITAL | Age: 68
Discharge: HOME | End: 2023-03-22
Attending: STUDENT IN AN ORGANIZED HEALTH CARE EDUCATION/TRAINING PROGRAM
Payer: MEDICARE

## 2023-03-22 NOTE — DISCHARGE INSTRUCTIONS
Your surgery is scheduled for 3/28/23.    Please report to [facility] on the 1st Floor at 1130 a.m.    THIS TIME IS SUBJECT TO CHANGE.  YOU WILL RECEIVE A PHONE CALL THE DAY BEFORE SURGERY BY 3:30 PM TO CONFIRM YOUR TIME OF ARRIVAL.  IF YOU HAVE NOT RECEIVED A PHONE CALL BY 3:30 PM THE DAY BEFORE YOUR SURGERY PLEASE CALL [phone]     INSTRUCTIONS IMPORTANT!!!  ¨ Do not eat or drink after 12 midnight-including water, candy, gum, & mints. OK to brush teeth.      ____  Proceed to [facility] on *** for additional testing.        ____  Do not wear makeup, including mascara.  ____  No powder, lotions or creams to surgical area.  ____  Please remove all jewelry, including piercings and leave at home.  ____  No money or valuables needed. Please leave at home.  ____  Please bring any documents given by your doctor.  ____  If going home the same day, arrange for a ride home. You will not be able to             drive if Anesthesia was used.  ____  Children under 18 years require a parent / guardian present the entire time             they are in surgery / recovery.  ____  Wear loose fitting clothing. Allow for dressings, bandages.  ____  Stop Aspirin, Ibuprofen, Motrin, Aleve, Goody's/BC powders, Excedrine and Naproxen (NSAIDS) at least 3-5 days before surgery, unless otherwise instructed by your doctor, or the nurse.   You MAY use Tylenol/acetaminophen until day of surgery.  ____  Wash the surgical area with Hibiclens the night before surgery, and again the             morning of surgery.  Be sure to rinse hibiclens off completely (if instructed by   nurse).  ____  If you take diabetic medication, do not take am of surgery unless instructed by Doctor.  ____  Call MD for temperature above 101 degrees or any other signs of infection such as Urinary (bladder) infection, Upper respiratory infection, skin boils, etc.  ____ Stop taking any Fish Oil supplement or any Vitamins that contain Vitamin E at  least 5 days prior to surgery.  ____ Do Not wear your contact lenses the day of your procedure.  You may wear your glasses.      ____Do not shave surgical site for 3 days prior to surgery.  ____ Practice Good hand washing before, during, and after procedure.      I have read or had read and explained to me, and understand the above information.  Additional comments or instructions:  For additional questions call [phone]      ANESTHESIA SIDE EFFECTS  -For the first 24 hours after surgery:  Do not drive, use heavy equipment, make important decisions, or drink alcohol  -It is normal to feel sleepy for several hours.  Rest until you are more awake.  -Have someone stay with you, if needed.  They can watch for problems and help keep you safe.  -Some possible post anesthesia side effects include: nausea and vomiting, sore throat and hoarseness, sleepiness, and dizziness.        Pre-Op Bathing Instructions    Before surgery, you can play an important role in your own health.    Because skin is not sterile, we need to be sure that your skin is as free of germs as possible. By following the instructions below, you can reduce the number of germs on your skin before surgery.    IMPORTANT: You will need to shower with a special soap called Hibiclens*, available at any pharmacy.  If you are allergic to Chlorhexidine (the antiseptic in Hibiclens), use an antibacterial soap such as Dial Soap for your preoperative shower.  You will shower with Hibiclens both the night before your surgery and the morning of your surgery.  Do not use Hibiclens on the head, face or genitals to avoid injury to those areas.    STEP #1: THE NIGHT BEFORE YOUR SURGERY     Do not shave the area of your body where your surgery will be performed.  Shower and wash your hair and body as usual with your normal soap and shampoo.  Rinse your hair and body thoroughly after you shower to remove all soap residue.  With your hand, apply one packet of Hibiclens soap to  the surgical site.   Wash the site gently for five (5) minutes. Do not scrub your skin too hard.   Do not wash with your regular soap after Hibiclens is used.  Rinse your body thoroughly.  Pat yourself dry with a clean, soft towel.  Do not use lotion, cream, or powder.  Wear clean clothes.    STEP #2: THE MORNING OF YOUR SURGERY     Repeat Step #1.    * Not to be used by people allergic to Chlorhexidine.